# Patient Record
Sex: MALE | Race: WHITE | NOT HISPANIC OR LATINO | Employment: FULL TIME | RURAL
[De-identification: names, ages, dates, MRNs, and addresses within clinical notes are randomized per-mention and may not be internally consistent; named-entity substitution may affect disease eponyms.]

---

## 2020-09-17 ENCOUNTER — HISTORICAL (OUTPATIENT)
Dept: ADMINISTRATIVE | Facility: HOSPITAL | Age: 60
End: 2020-09-17

## 2020-10-08 ENCOUNTER — HISTORICAL (OUTPATIENT)
Dept: ADMINISTRATIVE | Facility: HOSPITAL | Age: 60
End: 2020-10-08

## 2020-10-08 LAB — SARS-COV+SARS-COV-2 AG RESP QL IA.RAPID: NEGATIVE

## 2021-01-12 ENCOUNTER — HISTORICAL (OUTPATIENT)
Dept: ADMINISTRATIVE | Facility: HOSPITAL | Age: 61
End: 2021-01-12

## 2021-01-12 LAB — GLUCOSE SERPL-MCNC: 143 MG/DL (ref 70–105)

## 2021-01-13 LAB

## 2021-04-14 DIAGNOSIS — Z98.890 STATUS POST ARTHROSCOPY OF HIP: Primary | ICD-10-CM

## 2021-04-15 ENCOUNTER — OFFICE VISIT (OUTPATIENT)
Dept: ORTHOPEDICS | Facility: CLINIC | Age: 61
End: 2021-04-15
Payer: OTHER GOVERNMENT

## 2021-04-15 ENCOUNTER — HOSPITAL ENCOUNTER (OUTPATIENT)
Dept: RADIOLOGY | Facility: HOSPITAL | Age: 61
Discharge: HOME | End: 2021-04-15
Attending: ORTHOPAEDIC SURGERY
Payer: OTHER GOVERNMENT

## 2021-04-15 DIAGNOSIS — Z98.890 STATUS POST ARTHROSCOPY OF HIP: ICD-10-CM

## 2021-04-15 DIAGNOSIS — Z98.890 S/P HIP ARTHROSCOPY: Primary | ICD-10-CM

## 2021-04-15 PROCEDURE — 72170 X-RAY EXAM OF PELVIS: CPT | Mod: TC,59

## 2021-04-15 PROCEDURE — 99212 PR OFFICE/OUTPT VISIT, EST, LEVL II, 10-19 MIN: ICD-10-PCS | Mod: ,,, | Performed by: ORTHOPAEDIC SURGERY

## 2021-04-15 PROCEDURE — 72170 X-RAY EXAM OF PELVIS: CPT | Mod: 26,,, | Performed by: ORTHOPAEDIC SURGERY

## 2021-04-15 PROCEDURE — 73502 X-RAY EXAM HIP UNI 2-3 VIEWS: CPT | Mod: TC,LT

## 2021-04-15 PROCEDURE — 72170 XR PELVIS ROUTINE AP: ICD-10-PCS | Mod: 26,,, | Performed by: ORTHOPAEDIC SURGERY

## 2021-04-15 PROCEDURE — 99212 OFFICE O/P EST SF 10 MIN: CPT | Mod: ,,, | Performed by: ORTHOPAEDIC SURGERY

## 2021-06-22 ENCOUNTER — CLINICAL SUPPORT (OUTPATIENT)
Dept: FAMILY MEDICINE | Facility: CLINIC | Age: 61
End: 2021-06-22
Payer: OTHER GOVERNMENT

## 2021-06-22 DIAGNOSIS — Z20.822 SUSPECTED COVID-19 VIRUS INFECTION: Primary | ICD-10-CM

## 2021-06-22 LAB
CTP QC/QA: YES
FLUAV AG NPH QL: NEGATIVE
FLUBV AG NPH QL: NEGATIVE
SARS-COV-2 AG RESP QL IA.RAPID: NEGATIVE

## 2021-06-22 PROCEDURE — 87635 SARS-COV-2 COVID-19 AMP PRB: CPT | Mod: ,,, | Performed by: CLINICAL MEDICAL LABORATORY

## 2021-06-22 PROCEDURE — 87635 SARS-COV-2 (COVID-19) QUALITATIVE PCR: ICD-10-PCS | Mod: ,,, | Performed by: CLINICAL MEDICAL LABORATORY

## 2021-06-22 PROCEDURE — 87428 POCT SARS-COV2 (COVID) WITH FLU ANTIGEN: ICD-10-PCS | Mod: QW,GC,, | Performed by: FAMILY MEDICINE

## 2021-06-22 PROCEDURE — 99202 OFFICE O/P NEW SF 15 MIN: CPT | Mod: GC,,, | Performed by: FAMILY MEDICINE

## 2021-06-22 PROCEDURE — 99202 PR OFFICE/OUTPT VISIT, NEW, LEVL II, 15-29 MIN: ICD-10-PCS | Mod: GC,,, | Performed by: FAMILY MEDICINE

## 2021-06-22 PROCEDURE — 87428 SARSCOV & INF VIR A&B AG IA: CPT | Mod: QW,GC,, | Performed by: FAMILY MEDICINE

## 2021-06-23 LAB — SARS-COV-2 RNA RESP QL NAA+PROBE: NEGATIVE

## 2021-08-02 ENCOUNTER — OFFICE VISIT (OUTPATIENT)
Dept: PAIN MEDICINE | Facility: CLINIC | Age: 61
End: 2021-08-02
Payer: OTHER GOVERNMENT

## 2021-08-02 VITALS
HEIGHT: 71 IN | BODY MASS INDEX: 40.18 KG/M2 | HEART RATE: 89 BPM | SYSTOLIC BLOOD PRESSURE: 159 MMHG | WEIGHT: 287 LBS | RESPIRATION RATE: 20 BRPM | DIASTOLIC BLOOD PRESSURE: 89 MMHG

## 2021-08-02 DIAGNOSIS — M54.17 LUMBOSACRAL RADICULOPATHY: Chronic | ICD-10-CM

## 2021-08-02 DIAGNOSIS — M96.1 POSTLAMINECTOMY SYNDROME, LUMBAR: Primary | Chronic | ICD-10-CM

## 2021-08-02 PROCEDURE — 99215 OFFICE O/P EST HI 40 MIN: CPT | Mod: PBBFAC | Performed by: PAIN MEDICINE

## 2021-08-02 PROCEDURE — 99214 OFFICE O/P EST MOD 30 MIN: CPT | Mod: S$PBB,,, | Performed by: PAIN MEDICINE

## 2021-08-02 PROCEDURE — 99214 PR OFFICE/OUTPT VISIT, EST, LEVL IV, 30-39 MIN: ICD-10-PCS | Mod: S$PBB,,, | Performed by: PAIN MEDICINE

## 2021-08-02 RX ORDER — METFORMIN HYDROCHLORIDE EXTENDED-RELEASE TABLETS 1000 MG/1
1000 TABLET, FILM COATED, EXTENDED RELEASE ORAL 2 TIMES DAILY WITH MEALS
COMMUNITY

## 2021-08-02 RX ORDER — GABAPENTIN 300 MG/1
300 CAPSULE ORAL NIGHTLY
COMMUNITY
End: 2022-10-04

## 2021-08-16 ENCOUNTER — TELEPHONE (OUTPATIENT)
Dept: PAIN MEDICINE | Facility: CLINIC | Age: 61
End: 2021-08-16

## 2021-08-16 DIAGNOSIS — Z11.59 SCREENING FOR VIRAL DISEASE: Primary | ICD-10-CM

## 2021-08-30 ENCOUNTER — TELEPHONE (OUTPATIENT)
Dept: PAIN MEDICINE | Facility: CLINIC | Age: 61
End: 2021-08-30

## 2021-08-30 DIAGNOSIS — Z11.59 SCREENING FOR VIRAL DISEASE: Primary | ICD-10-CM

## 2021-09-16 ENCOUNTER — ANESTHESIA (OUTPATIENT)
Dept: PAIN MEDICINE | Facility: HOSPITAL | Age: 61
End: 2021-09-16
Payer: OTHER GOVERNMENT

## 2021-09-16 ENCOUNTER — HOSPITAL ENCOUNTER (OUTPATIENT)
Facility: HOSPITAL | Age: 61
Discharge: HOME | End: 2021-09-16
Attending: PAIN MEDICINE | Admitting: PAIN MEDICINE
Payer: OTHER GOVERNMENT

## 2021-09-16 ENCOUNTER — ANESTHESIA EVENT (OUTPATIENT)
Dept: PAIN MEDICINE | Facility: HOSPITAL | Age: 61
End: 2021-09-16
Payer: OTHER GOVERNMENT

## 2021-09-16 VITALS
HEIGHT: 71 IN | TEMPERATURE: 98 F | SYSTOLIC BLOOD PRESSURE: 115 MMHG | OXYGEN SATURATION: 96 % | BODY MASS INDEX: 37.66 KG/M2 | DIASTOLIC BLOOD PRESSURE: 65 MMHG | HEART RATE: 71 BPM | RESPIRATION RATE: 11 BRPM | WEIGHT: 269 LBS

## 2021-09-16 DIAGNOSIS — M54.17 RADICULOPATHY OF LUMBOSACRAL REGION: ICD-10-CM

## 2021-09-16 LAB
GLUCOSE SERPL-MCNC: 111 MG/DL (ref 70–105)
GLUCOSE SERPL-MCNC: 111 MG/DL (ref 70–110)

## 2021-09-16 PROCEDURE — 25000003 PHARM REV CODE 250: Performed by: PAIN MEDICINE

## 2021-09-16 PROCEDURE — 25000003 PHARM REV CODE 250: Performed by: NURSE ANESTHETIST, CERTIFIED REGISTERED

## 2021-09-16 PROCEDURE — 64483 NJX AA&/STRD TFRM EPI L/S 1: CPT | Mod: LT,,, | Performed by: PAIN MEDICINE

## 2021-09-16 PROCEDURE — 25500020 PHARM REV CODE 255: Performed by: PAIN MEDICINE

## 2021-09-16 PROCEDURE — 63600175 PHARM REV CODE 636 W HCPCS: Performed by: PAIN MEDICINE

## 2021-09-16 PROCEDURE — D9220A PRA ANESTHESIA: ICD-10-PCS | Mod: ,,, | Performed by: NURSE ANESTHETIST, CERTIFIED REGISTERED

## 2021-09-16 PROCEDURE — D9220A PRA ANESTHESIA: Mod: ,,, | Performed by: NURSE ANESTHETIST, CERTIFIED REGISTERED

## 2021-09-16 PROCEDURE — 37000008 HC ANESTHESIA 1ST 15 MINUTES: Performed by: PAIN MEDICINE

## 2021-09-16 PROCEDURE — 63600175 PHARM REV CODE 636 W HCPCS: Performed by: NURSE ANESTHETIST, CERTIFIED REGISTERED

## 2021-09-16 PROCEDURE — 27201423 OPTIME MED/SURG SUP & DEVICES STERILE SUPPLY: Performed by: PAIN MEDICINE

## 2021-09-16 PROCEDURE — 27000284 HC CANNULA NASAL: Performed by: NURSE ANESTHETIST, CERTIFIED REGISTERED

## 2021-09-16 PROCEDURE — 64483 PR EPIDURAL INJ, ANES/STEROID, TRANSFORAMINAL, LUMB/SACR, SNGL LEVL: ICD-10-PCS | Mod: LT,,, | Performed by: PAIN MEDICINE

## 2021-09-16 PROCEDURE — 64483 NJX AA&/STRD TFRM EPI L/S 1: CPT | Mod: 59 | Performed by: PAIN MEDICINE

## 2021-09-16 PROCEDURE — 82962 GLUCOSE BLOOD TEST: CPT

## 2021-09-16 RX ORDER — SODIUM CHLORIDE 9 MG/ML
INJECTION, SOLUTION INTRAVENOUS CONTINUOUS
Status: DISCONTINUED | OUTPATIENT
Start: 2021-09-16 | End: 2021-09-16 | Stop reason: HOSPADM

## 2021-09-16 RX ORDER — LIDOCAINE HYDROCHLORIDE 20 MG/ML
INJECTION INTRAVENOUS
Status: DISCONTINUED | OUTPATIENT
Start: 2021-09-16 | End: 2021-09-16

## 2021-09-16 RX ORDER — TRIAMCINOLONE ACETONIDE 40 MG/ML
INJECTION, SUSPENSION INTRA-ARTICULAR; INTRAMUSCULAR
Status: DISCONTINUED | OUTPATIENT
Start: 2021-09-16 | End: 2021-09-16 | Stop reason: HOSPADM

## 2021-09-16 RX ORDER — IOPAMIDOL 612 MG/ML
INJECTION, SOLUTION INTRATHECAL
Status: DISCONTINUED | OUTPATIENT
Start: 2021-09-16 | End: 2021-09-16 | Stop reason: HOSPADM

## 2021-09-16 RX ORDER — PROPOFOL 10 MG/ML
VIAL (ML) INTRAVENOUS
Status: DISCONTINUED | OUTPATIENT
Start: 2021-09-16 | End: 2021-09-16

## 2021-09-16 RX ORDER — AMLODIPINE BESYLATE 2.5 MG/1
2.5 TABLET ORAL DAILY
COMMUNITY

## 2021-09-16 RX ADMIN — SODIUM CHLORIDE: 9 INJECTION, SOLUTION INTRAVENOUS at 11:09

## 2021-09-16 RX ADMIN — PROPOFOL 50 MG: 10 INJECTION, EMULSION INTRAVENOUS at 11:09

## 2021-09-16 RX ADMIN — LIDOCAINE HYDROCHLORIDE 100 MG: 20 INJECTION, SOLUTION INTRAVENOUS at 11:09

## 2021-10-04 ENCOUNTER — OFFICE VISIT (OUTPATIENT)
Dept: PAIN MEDICINE | Facility: CLINIC | Age: 61
End: 2021-10-04
Payer: OTHER GOVERNMENT

## 2021-10-04 VITALS
RESPIRATION RATE: 20 BRPM | SYSTOLIC BLOOD PRESSURE: 138 MMHG | DIASTOLIC BLOOD PRESSURE: 93 MMHG | HEIGHT: 71 IN | WEIGHT: 258 LBS | HEART RATE: 76 BPM | BODY MASS INDEX: 36.12 KG/M2

## 2021-10-04 DIAGNOSIS — M54.6 THORACIC SPINE PAIN: Chronic | ICD-10-CM

## 2021-10-04 DIAGNOSIS — M54.17 RADICULOPATHY OF LUMBOSACRAL REGION: Primary | Chronic | ICD-10-CM

## 2021-10-04 PROCEDURE — 99214 OFFICE O/P EST MOD 30 MIN: CPT | Mod: PBBFAC | Performed by: PHYSICIAN ASSISTANT

## 2021-10-04 PROCEDURE — 99213 OFFICE O/P EST LOW 20 MIN: CPT | Mod: S$PBB,,, | Performed by: PHYSICIAN ASSISTANT

## 2021-10-04 PROCEDURE — 99213 PR OFFICE/OUTPT VISIT, EST, LEVL III, 20-29 MIN: ICD-10-PCS | Mod: S$PBB,,, | Performed by: PHYSICIAN ASSISTANT

## 2022-04-19 ENCOUNTER — OFFICE VISIT (OUTPATIENT)
Dept: PAIN MEDICINE | Facility: CLINIC | Age: 62
End: 2022-04-19
Payer: OTHER GOVERNMENT

## 2022-04-19 VITALS
HEART RATE: 70 BPM | WEIGHT: 245 LBS | DIASTOLIC BLOOD PRESSURE: 90 MMHG | RESPIRATION RATE: 17 BRPM | HEIGHT: 71 IN | BODY MASS INDEX: 34.3 KG/M2 | SYSTOLIC BLOOD PRESSURE: 132 MMHG

## 2022-04-19 DIAGNOSIS — M54.17 RADICULOPATHY OF LUMBOSACRAL REGION: Primary | Chronic | ICD-10-CM

## 2022-04-19 DIAGNOSIS — M54.9 DORSALGIA, UNSPECIFIED: ICD-10-CM

## 2022-04-19 DIAGNOSIS — M54.6 THORACIC SPINE PAIN: Chronic | ICD-10-CM

## 2022-04-19 PROCEDURE — 99214 OFFICE O/P EST MOD 30 MIN: CPT | Mod: PBBFAC | Performed by: PHYSICIAN ASSISTANT

## 2022-04-19 PROCEDURE — 99214 OFFICE O/P EST MOD 30 MIN: CPT | Mod: S$PBB,25,, | Performed by: PHYSICIAN ASSISTANT

## 2022-04-19 PROCEDURE — 99214 PR OFFICE/OUTPT VISIT, EST, LEVL IV, 30-39 MIN: ICD-10-PCS | Mod: S$PBB,25,, | Performed by: PHYSICIAN ASSISTANT

## 2022-04-19 PROCEDURE — 96372 THER/PROPH/DIAG INJ SC/IM: CPT | Mod: PBBFAC | Performed by: PHYSICIAN ASSISTANT

## 2022-04-19 RX ORDER — KETOROLAC TROMETHAMINE 30 MG/ML
60 INJECTION, SOLUTION INTRAMUSCULAR; INTRAVENOUS
Status: COMPLETED | OUTPATIENT
Start: 2022-04-19 | End: 2022-04-19

## 2022-04-19 RX ADMIN — KETOROLAC TROMETHAMINE 60 MG: 30 INJECTION, SOLUTION INTRAMUSCULAR at 10:04

## 2022-04-19 NOTE — PROGRESS NOTES
Disclaimer:  This note has been generated using voice recognition software.  There may be type of graft focal areas that have been missed during a proof reading      Subjective:      Patient ID: Ant Webber is a 61 y.o. male.    Chief Complaint: Low-back Pain      Pain  This is a chronic problem. The current episode started more than 1 year ago. The problem occurs daily. The problem has been waxing and waning. Associated symptoms include arthralgias. Pertinent negatives include no change in bowel habit, chest pain, chills, coughing, diaphoresis, rash, sore throat, vertigo or vomiting.     Review of Systems   Constitutional: Negative for activity change, appetite change, chills, diaphoresis and unexpected weight change.   HENT: Negative for drooling, ear discharge, ear pain, facial swelling, mouth dryness, nosebleeds, sore throat, trouble swallowing, voice change and goiter.    Eyes: Negative for photophobia, pain, discharge, redness and visual disturbance.   Respiratory: Negative for apnea, cough, choking, chest tightness, shortness of breath, wheezing and stridor.    Cardiovascular: Negative for chest pain, palpitations and leg swelling.   Gastrointestinal: Negative for abdominal distention, change in bowel habit, diarrhea, rectal pain, vomiting, fecal incontinence and change in bowel habit.   Endocrine: Negative for cold intolerance, heat intolerance, polydipsia, polyphagia and polyuria.   Genitourinary: Negative for bladder incontinence, dysuria, flank pain and frequency.   Musculoskeletal: Positive for arthralgias, back pain and leg pain.   Integumentary:  Negative for color change, pallor and rash.   Neurological: Negative for dizziness, vertigo, seizures, syncope, facial asymmetry, speech difficulty, light-headedness, disturbances in coordination, memory loss and coordination difficulties.   Hematological: Negative for adenopathy. Does not bruise/bleed easily.   Psychiatric/Behavioral: Negative for  "agitation, behavioral problems, confusion, decreased concentration, dysphoric mood, hallucinations, self-injury and suicidal ideas. The patient is not nervous/anxious and is not hyperactive.             Objective:  Vitals:    04/19/22 0909   BP: (!) 132/90   Pulse: 70   Resp: 17   Weight: 111.1 kg (245 lb)   Height: 5' 11" (1.803 m)   PainSc:   4         Physical Exam  Vitals and nursing note reviewed. Exam conducted with a chaperone present.   Constitutional:       General: He is awake. He is not in acute distress.     Appearance: Normal appearance. He is not ill-appearing or toxic-appearing.   HENT:      Head: Normocephalic and atraumatic.      Nose: Nose normal.      Mouth/Throat:      Mouth: Mucous membranes are moist.      Pharynx: Oropharynx is clear.   Eyes:      Conjunctiva/sclera: Conjunctivae normal.      Pupils: Pupils are equal, round, and reactive to light.   Cardiovascular:      Rate and Rhythm: Normal rate.   Pulmonary:      Effort: Pulmonary effort is normal. No respiratory distress.   Abdominal:      Palpations: Abdomen is soft.      Tenderness: There is no guarding.   Musculoskeletal:         General: No signs of injury. Normal range of motion.      Cervical back: Normal range of motion and neck supple. No rigidity.      Thoracic back: Tenderness present.      Lumbar back: Tenderness present.   Skin:     General: Skin is warm and dry.      Coloration: Skin is not jaundiced or pale.   Neurological:      General: No focal deficit present.      Mental Status: He is alert and oriented to person, place, and time. Mental status is at baseline.      Cranial Nerves: Cranial nerves are intact. No cranial nerve deficit (II-XII).   Psychiatric:         Mood and Affect: Mood normal.         Behavior: Behavior normal. Behavior is cooperative.         Thought Content: Thought content normal.           No orders of the defined types were placed in this encounter.       FL Fluoro for Pain Management  See OP Notes " for results.     IMPRESSION: See OP Notes for results.     This procedure was auto-finalized by: Virtual Radiologist       No visits with results within 6 Month(s) from this visit.   Latest known visit with results is:   Admission on 09/16/2021, Discharged on 09/16/2021   Component Date Value Ref Range Status    POC Glucose 09/16/2021 111 (A) 70 - 110 MG/DL Final    POC Glucose 09/16/2021 111 (A) 70 - 105 mg/dL Final           Assessment:      1. Radiculopathy of lumbosacral region    2. Thoracic spine pain              Requested Prescriptions      No prescriptions requested or ordered in this encounter         Plan:    Cannot have MRI, has implantable device for sleep apnea    Not using narcotics from our office    He had lumbar L4 L5/S1 TFESI left side.  September 2021    Complaint of back pain buttock and leg pain pain numbness and tingling bilateral legs worse with standing walking can have some sharp stabbing components bilateral lower extremities worse with increased activity    Denies loss of bowel or bladder function    Requesting further evaluation for his back pain    Had x-rays done emergency room [facility]    X-ray lumbar spine [facility] February 2022 postoperative changes noted scoliosis noted mild, diffuse demineralization multiple level degenerative changes    After discussing options risks benefits she like to proceed with    Lumbar myelogram with post contrast CT    Bone density study diffuse demineralization seen on x-ray lumbar spine    Continue home exercise program as directed    Follow-up 1 month    Dr. Simms, September 2022

## 2022-04-25 ENCOUNTER — HOSPITAL ENCOUNTER (OUTPATIENT)
Dept: RADIOLOGY | Facility: HOSPITAL | Age: 62
Discharge: HOME | End: 2022-04-25
Attending: PHYSICIAN ASSISTANT
Payer: OTHER GOVERNMENT

## 2022-04-25 VITALS
HEART RATE: 82 BPM | RESPIRATION RATE: 18 BRPM | SYSTOLIC BLOOD PRESSURE: 125 MMHG | HEIGHT: 71 IN | OXYGEN SATURATION: 94 % | DIASTOLIC BLOOD PRESSURE: 84 MMHG | BODY MASS INDEX: 35 KG/M2 | TEMPERATURE: 98 F | WEIGHT: 250 LBS

## 2022-04-25 DIAGNOSIS — M54.17 RADICULOPATHY OF LUMBOSACRAL REGION: ICD-10-CM

## 2022-04-25 LAB
APTT PPP: 31.2 SECONDS (ref 25.2–37.3)
BASOPHILS # BLD AUTO: 0.07 K/UL (ref 0–0.2)
BASOPHILS NFR BLD AUTO: 1.2 % (ref 0–1)
DIFFERENTIAL METHOD BLD: ABNORMAL
EOSINOPHIL # BLD AUTO: 0.34 K/UL (ref 0–0.5)
EOSINOPHIL NFR BLD AUTO: 5.7 % (ref 1–4)
EOSINOPHIL NFR BLD MANUAL: 6 % (ref 1–4)
ERYTHROCYTE [DISTWIDTH] IN BLOOD BY AUTOMATED COUNT: 13.2 % (ref 11.5–14.5)
HCT VFR BLD AUTO: 48.5 % (ref 40–54)
HGB BLD-MCNC: 15.5 G/DL (ref 13.5–18)
IMM GRANULOCYTES # BLD AUTO: 0.01 K/UL (ref 0–0.04)
IMM GRANULOCYTES NFR BLD: 0.2 % (ref 0–0.4)
INR BLD: 1.08 (ref 0.9–1.1)
LYMPHOCYTES # BLD AUTO: 0.48 K/UL (ref 1–4.8)
LYMPHOCYTES NFR BLD AUTO: 8.1 % (ref 27–41)
LYMPHOCYTES NFR BLD MANUAL: 5 % (ref 27–41)
MCH RBC QN AUTO: 29.5 PG (ref 27–31)
MCHC RBC AUTO-ENTMCNC: 32 G/DL (ref 32–36)
MCV RBC AUTO: 92.4 FL (ref 80–96)
MONOCYTES # BLD AUTO: 0.54 K/UL (ref 0–0.8)
MONOCYTES NFR BLD AUTO: 9.1 % (ref 2–6)
MONOCYTES NFR BLD MANUAL: 3 % (ref 2–6)
MPC BLD CALC-MCNC: 9.3 FL (ref 9.4–12.4)
NEUTROPHILS # BLD AUTO: 4.49 K/UL (ref 1.8–7.7)
NEUTROPHILS NFR BLD AUTO: 75.7 % (ref 53–65)
NEUTS BAND NFR BLD MANUAL: 2 % (ref 1–5)
NEUTS SEG NFR BLD MANUAL: 84 % (ref 50–62)
NRBC # BLD AUTO: 0 X10E3/UL
NRBC, AUTO (.00): 0 %
PLATELET # BLD AUTO: 292 K/UL (ref 150–400)
PROTHROMBIN TIME: 14 SECONDS (ref 11.7–14.7)
RBC # BLD AUTO: 5.25 M/UL (ref 4.6–6.2)
WBC # BLD AUTO: 5.93 K/UL (ref 4.5–11)

## 2022-04-25 PROCEDURE — 72132 CT LUMBAR SPINE W/DYE: CPT | Mod: 26,,, | Performed by: RADIOLOGY

## 2022-04-25 PROCEDURE — 36415 COLL VENOUS BLD VENIPUNCTURE: CPT | Performed by: PHYSICIAN ASSISTANT

## 2022-04-25 PROCEDURE — 85730 THROMBOPLASTIN TIME PARTIAL: CPT | Performed by: RADIOLOGY

## 2022-04-25 PROCEDURE — 25500020 PHARM REV CODE 255: Performed by: PHYSICIAN ASSISTANT

## 2022-04-25 PROCEDURE — 72132 CT LUMBAR SPINE W/DYE: CPT | Mod: TC

## 2022-04-25 PROCEDURE — 36415 COLL VENOUS BLD VENIPUNCTURE: CPT | Performed by: RADIOLOGY

## 2022-04-25 PROCEDURE — 85025 COMPLETE CBC W/AUTO DIFF WBC: CPT | Performed by: RADIOLOGY

## 2022-04-25 PROCEDURE — 27201268 FL MYELOGRAM LUMBAR WITH CT TO FOLLOW

## 2022-04-25 PROCEDURE — 72132 CT LUMBAR SPINE WITH CONTRAST: ICD-10-PCS | Mod: 26,,, | Performed by: RADIOLOGY

## 2022-04-25 RX ORDER — IOPAMIDOL 408 MG/ML
15 INJECTION, SOLUTION INTRATHECAL
Status: COMPLETED | OUTPATIENT
Start: 2022-04-25 | End: 2022-04-25

## 2022-04-25 RX ORDER — SODIUM CHLORIDE 450 MG/100ML
INJECTION, SOLUTION INTRAVENOUS ONCE
Status: DISCONTINUED | OUTPATIENT
Start: 2022-04-25 | End: 2022-04-26 | Stop reason: HOSPADM

## 2022-04-25 RX ORDER — DIAZEPAM 5 MG/1
5 TABLET ORAL ONCE
Status: DISCONTINUED | OUTPATIENT
Start: 2022-04-25 | End: 2022-04-26 | Stop reason: HOSPADM

## 2022-04-25 RX ADMIN — IOPAMIDOL 15 ML: 408 INJECTION, SOLUTION INTRATHECAL at 01:04

## 2022-04-25 NOTE — PROGRESS NOTES
Lumbar myelogram  Performed by A Fitz PAINTER  H&P has been reviewed.  Labs are within normal limits.  The procedure was explained to the patient including risks and possible complications.  Patient agreed to procedure consent is signed.  A formal timeout was called all staff present agree to patient and procedure.  The lower back was prepped with Betadine and sterile field was established.  1% lidocaine was used as local anesthetic.  Under fluoroscopic guidance a 22 gauge spinal needle was placed at the L3-L4 interspace.  15 cc of Isovue 200 M was injected into the intrathecal sac.  The needle was removed and the puncture site was cleaned and bandaged.  The patient tolerated the procedure well and there were no immediate postprocedure complications.  Total fluoroscopy time was 1 minutes 26 seconds.

## 2022-05-17 NOTE — PROGRESS NOTES
Subjective:      Patient ID: Ant Webber is a 61 y.o. male.    Chief Complaint: Low-back Pain and Leg Pain      Pain  This is a chronic problem. The current episode started more than 1 year ago. The problem occurs daily. The problem has been unchanged. Associated symptoms include arthralgias. Pertinent negatives include no change in bowel habit, chest pain, coughing, diaphoresis, fever, rash, sore throat, vertigo or vomiting.     Review of Systems   Constitutional: Negative for activity change, appetite change, diaphoresis, fever and unexpected weight change.   HENT: Negative for drooling, ear discharge, ear pain, facial swelling, mouth dryness, nosebleeds, sore throat, trouble swallowing, voice change and goiter.    Eyes: Negative for photophobia, pain, discharge, redness and visual disturbance.   Respiratory: Negative for apnea, cough, choking, chest tightness, shortness of breath, wheezing and stridor.    Cardiovascular: Negative for chest pain, palpitations and leg swelling.   Gastrointestinal: Negative for abdominal distention, change in bowel habit, diarrhea, rectal pain, vomiting, fecal incontinence and change in bowel habit.   Endocrine: Negative for cold intolerance, heat intolerance, polydipsia, polyphagia and polyuria.   Genitourinary: Negative for bladder incontinence, dysuria, flank pain and frequency.   Musculoskeletal: Positive for arthralgias, back pain and leg pain.   Integumentary:  Negative for color change, pallor and rash.   Neurological: Negative for dizziness, vertigo, seizures, syncope, facial asymmetry, speech difficulty, light-headedness, disturbances in coordination, memory loss and coordination difficulties.   Hematological: Negative for adenopathy. Does not bruise/bleed easily.   Psychiatric/Behavioral: Negative for agitation, behavioral problems, confusion, decreased concentration, dysphoric mood, hallucinations and suicidal ideas. The patient is not nervous/anxious and is not  "hyperactive.             Objective:  Vitals:    05/18/22 0848   BP: 131/86   Pulse: 82   Resp: 18   Weight: 109.8 kg (242 lb)   Height: 5' 11" (1.803 m)   PainSc:   4         Physical Exam  Vitals and nursing note reviewed. Exam conducted with a chaperone present.   Constitutional:       General: He is awake. He is not in acute distress.     Appearance: Normal appearance. He is not toxic-appearing.   HENT:      Head: Normocephalic and atraumatic.      Nose: Nose normal.      Mouth/Throat:      Mouth: Mucous membranes are moist.      Pharynx: Oropharynx is clear.   Eyes:      Conjunctiva/sclera: Conjunctivae normal.      Pupils: Pupils are equal, round, and reactive to light.   Cardiovascular:      Rate and Rhythm: Normal rate.   Pulmonary:      Effort: Pulmonary effort is normal. No respiratory distress.   Abdominal:      Palpations: Abdomen is soft.      Tenderness: There is no guarding.   Musculoskeletal:         General: Normal range of motion.      Cervical back: Normal range of motion and neck supple. No rigidity.      Thoracic back: Tenderness present.      Lumbar back: Tenderness present.   Skin:     General: Skin is warm and dry.      Coloration: Skin is not jaundiced or pale.   Neurological:      General: No focal deficit present.      Mental Status: He is alert and oriented to person, place, and time. Mental status is at baseline.      Cranial Nerves: Cranial nerves are intact. No cranial nerve deficit (II-XII).   Psychiatric:         Mood and Affect: Mood normal.         Behavior: Behavior normal. Behavior is cooperative.         Thought Content: Thought content normal.           Orders Placed This Encounter   Procedures    DXA Bone Density Spine And Hip     Standing Status:   Future     Standing Expiration Date:   6/18/2022     Order Specific Question:   May the Radiologist modify the order per protocol to meet the clinical needs of the patient?     Answer:   Yes     Order Specific Question:   Release " to patient     Answer:   Immediate    Ambulatory referral/consult to Spine Surgery     Standing Status:   Future     Standing Expiration Date:   6/18/2023     Referral Priority:   Routine     Referral Type:   Consultation     Referral Reason:   Specialty Services Required     Requested Specialty:   Orthopedic Surgery     Number of Visits Requested:   1        CT Lumbar Spine With Contrast  Narrative: EXAMINATION:  CT LUMBAR SPINE WITH CONTRAST    CLINICAL HISTORY:  Low back pain, symptoms persist with > 6wks conservative treatment;  Radiculopathy, lumbosacral region    TECHNIQUE:  Following the injection of intrathecal contrast, a CT of the lumbar spine was performed per myelogram technique.  No intravenous contrast administered.    COMPARISON:  Fluoroscopic imaging earlier today    FINDINGS:  There is no obstruction of the flow of contrast within the thecal sac.    The vertebral body heights and alignment are maintained.  There is partial sacralization of L5 on the left.  Degenerative endplate and facet changes lower lumbar spine.  The conus terminates at T12-L1 level.    T11-12: No spinal canal or foraminal stenosis.    T12-L1: No spinal canal or foraminal stenosis.    L1-L2: No spinal canal or foraminal stenosis.    L2-L3: Disc bulge and facet degeneration.  No spinal canal stenosis.  Mild bilateral foraminal stenosis.    L3-L4: Previous left hemilaminectomy.  Disc bulging.  No spinal canal stenosis.  Facet degeneration.  Moderate left and mild right foraminal stenosis.    L4-5 disc osteophyte complex.  No spinal canal stenosis.  Moderate bilateral foraminal stenosis from facet degeneration and vertebral body osteophytes.    L5-S1: No spinal canal stenosis.  Facet degeneration.  Mild right foraminal stenosis.  Impression: Multilevel degenerative changes of the lumbar spine as detailed.    Electronically signed by: Alan Monet  Date:    04/25/2022  Time:    13:24  FL Myelogram Lumbar with CT to Follow  Narrative:  EXAMINATION:  FL MYELOGRAM LUMBAR WITH CT TO FOLLOW    CLINICAL HISTORY:  Radiculopathy, lumbosacral region    TECHNIQUE:  Performed by MADINA Duran.  Following discussion of the risks, benefits, and alternatives informed consent was obtained.  Patient brought to the fluoroscopy room and placed prone on the table.  Time-out performed.  Fluoroscopy used to  a site.  The overlying skin of the lower back was prepped and draped in routine sterile fashion.  Lidocaine utilized for local anesthetic.  Under fluoroscopic guidance, a 22 gauge spinal needle was advanced to the L3-4 interspace.  Upon removal of the stylet, there was free return of clear CSF.  Next, 15 mL Isovue 200 M was injected into the intrathecal sac under real-time fluoroscopic imaging.  Imaging obtained.  Needle removed.  Sterile dressing applied.  Total fluoroscopy time 1 minutes 28 seconds.  Patient transferred to CT in stable condition.    COMPARISON:  None    FINDINGS:  Fluoroscopic imaging showed the lower lumbar spine.  Contrast flows freely within the intrathecal sac of the lumbar region.  Impression: Successful fluoroscopic guided myelogram for subsequent CT.    Electronically signed by: Alan Monet  Date:    04/25/2022  Time:    13:20       Hospital Outpatient Visit on 04/25/2022   Component Date Value Ref Range Status    PT 04/25/2022 14.0  11.7 - 14.7 seconds Final    INR 04/25/2022 1.08  0.90 - 1.10 Final    PTT 04/25/2022 31.2  25.2 - 37.3 seconds Final    WBC 04/25/2022 5.93  4.50 - 11.00 K/uL Final    RBC 04/25/2022 5.25  4.60 - 6.20 M/uL Final    Hemoglobin 04/25/2022 15.5  13.5 - 18.0 g/dL Final    Hematocrit 04/25/2022 48.5  40.0 - 54.0 % Final    MCV 04/25/2022 92.4  80.0 - 96.0 fL Final    MCH 04/25/2022 29.5  27.0 - 31.0 pg Final    MCHC 04/25/2022 32.0  32.0 - 36.0 g/dL Final    RDW 04/25/2022 13.2  11.5 - 14.5 % Final    Platelet Count 04/25/2022 292  150 - 400 K/uL Final    MPV 04/25/2022 9.3 (A) 9.4 - 12.4 fL  Final    Neutrophils % 04/25/2022 75.7 (A) 53.0 - 65.0 % Final    Lymphocytes % 04/25/2022 8.1 (A) 27.0 - 41.0 % Final    Monocytes % 04/25/2022 9.1 (A) 2.0 - 6.0 % Final    Eosinophils % 04/25/2022 5.7 (A) 1.0 - 4.0 % Final    Basophils % 04/25/2022 1.2 (A) 0.0 - 1.0 % Final    Immature Granulocytes % 04/25/2022 0.2  0.0 - 0.4 % Final    nRBC, Auto 04/25/2022 0.0  <=0.0 % Final    Neutrophils, Abs 04/25/2022 4.49  1.80 - 7.70 K/uL Final    Lymphocytes, Absolute 04/25/2022 0.48 (A) 1.00 - 4.80 K/uL Final    Monocytes, Absolute 04/25/2022 0.54  0.00 - 0.80 K/uL Final    Eosinophils, Absolute 04/25/2022 0.34  0.00 - 0.50 K/uL Final    Basophils, Absolute 04/25/2022 0.07  0.00 - 0.20 K/uL Final    Immature Granulocytes, Absolute 04/25/2022 0.01  0.00 - 0.04 K/uL Final    nRBC, Absolute 04/25/2022 0.00  <=0.00 x10e3/uL Final    Diff Type 04/25/2022 Manual   Final    Segmented Neutrophils, Man % 04/25/2022 84 (A) 50 - 62 % Final    Bands, Man % 04/25/2022 2  1 - 5 % Final    Lymphocytes, Man % 04/25/2022 5 (A) 27 - 41 % Final    Monocytes, Man % 04/25/2022 3  2 - 6 % Final    Eosinophils, Man % 04/25/2022 6 (A) 1 - 4 % Final           Assessment:      1. Radiculopathy of lumbosacral region    2. Thoracic spine pain              Requested Prescriptions      No prescriptions requested or ordered in this encounter         Plan:    Cannot have MRI, has implantable device for sleep apnea    Not using narcotics from our office    Back pain buttock and leg pain pain numbness and tingling bilateral legs worse with standing walking can have some sharp stabbing components bilateral lower extremities worse with increased activity    Requesting Toradol injection for back and joint pain leg pain    Toradol 60 mg IM, tolerated well    History lumbar surgery 2013 [facility]    He had lumbar L4 L5/S1 TFESI left side.  September 2021    Lumbar myelogram [facility] in 19 2022 degenerative changes L3/4  bulging disc no central canal stenosis noted moderate left and mild right foraminal stenosis  L5/S1 mild right foraminal stenosis    X-ray lumbar spine [facility] February 2022 postoperative changes noted scoliosis noted mild, diffuse demineralization multiple level degenerative changes    After discussing options risks benefits she like to proceed with    Referral spine surgery [facility]    Waiting for Bone density study diffuse demineralization seen on x-ray lumbar spine  Will reorder bone density study    Continue home exercise program as directed    Follow-up 3 months    Dr. Simms, September 2022

## 2022-05-18 ENCOUNTER — OFFICE VISIT (OUTPATIENT)
Dept: PAIN MEDICINE | Facility: CLINIC | Age: 62
End: 2022-05-18
Payer: OTHER GOVERNMENT

## 2022-05-18 VITALS
HEART RATE: 82 BPM | BODY MASS INDEX: 33.88 KG/M2 | SYSTOLIC BLOOD PRESSURE: 131 MMHG | DIASTOLIC BLOOD PRESSURE: 86 MMHG | HEIGHT: 71 IN | RESPIRATION RATE: 18 BRPM | WEIGHT: 242 LBS

## 2022-05-18 DIAGNOSIS — M54.17 RADICULOPATHY OF LUMBOSACRAL REGION: Primary | Chronic | ICD-10-CM

## 2022-05-18 DIAGNOSIS — M54.6 THORACIC SPINE PAIN: Chronic | ICD-10-CM

## 2022-05-18 PROCEDURE — 99214 OFFICE O/P EST MOD 30 MIN: CPT | Mod: PBBFAC,25 | Performed by: PHYSICIAN ASSISTANT

## 2022-05-18 PROCEDURE — 96372 THER/PROPH/DIAG INJ SC/IM: CPT | Mod: PBBFAC | Performed by: PHYSICIAN ASSISTANT

## 2022-05-18 PROCEDURE — 99214 PR OFFICE/OUTPT VISIT, EST, LEVL IV, 30-39 MIN: ICD-10-PCS | Mod: S$PBB,25,, | Performed by: PHYSICIAN ASSISTANT

## 2022-05-18 PROCEDURE — 99214 OFFICE O/P EST MOD 30 MIN: CPT | Mod: S$PBB,25,, | Performed by: PHYSICIAN ASSISTANT

## 2022-05-18 RX ORDER — KETOROLAC TROMETHAMINE 30 MG/ML
60 INJECTION, SOLUTION INTRAMUSCULAR; INTRAVENOUS
Status: COMPLETED | OUTPATIENT
Start: 2022-05-18 | End: 2022-05-18

## 2022-05-18 RX ADMIN — KETOROLAC TROMETHAMINE 60 MG: 30 INJECTION, SOLUTION INTRAMUSCULAR at 09:05

## 2022-05-25 ENCOUNTER — HOSPITAL ENCOUNTER (OUTPATIENT)
Dept: RADIOLOGY | Facility: HOSPITAL | Age: 62
Discharge: HOME | End: 2022-05-25
Attending: PHYSICIAN ASSISTANT
Payer: OTHER GOVERNMENT

## 2022-05-25 DIAGNOSIS — M54.6 THORACIC SPINE PAIN: ICD-10-CM

## 2022-05-25 DIAGNOSIS — M54.17 RADICULOPATHY OF LUMBOSACRAL REGION: ICD-10-CM

## 2022-05-25 PROCEDURE — 77080 DXA BONE DENSITY AXIAL: CPT | Mod: 26,,,

## 2022-05-25 PROCEDURE — 77080 DXA BONE DENSITY AXIAL: CPT | Mod: TC

## 2022-05-25 PROCEDURE — 77080 DEXA BONE DENSITY SPINE HIP: ICD-10-PCS | Mod: 26,,,

## 2022-06-06 DIAGNOSIS — M54.16 LUMBAR RADICULOPATHY: Primary | ICD-10-CM

## 2022-06-21 DIAGNOSIS — M54.16 LUMBAR RADICULOPATHY: Primary | ICD-10-CM

## 2022-06-29 ENCOUNTER — HOSPITAL ENCOUNTER (OUTPATIENT)
Dept: RADIOLOGY | Facility: HOSPITAL | Age: 62
Discharge: HOME | End: 2022-06-29
Attending: ORTHOPAEDIC SURGERY
Payer: OTHER GOVERNMENT

## 2022-06-29 ENCOUNTER — OFFICE VISIT (OUTPATIENT)
Dept: SPINE | Facility: CLINIC | Age: 62
End: 2022-06-29
Payer: OTHER GOVERNMENT

## 2022-06-29 DIAGNOSIS — M54.17 RADICULOPATHY OF LUMBOSACRAL REGION: Chronic | ICD-10-CM

## 2022-06-29 DIAGNOSIS — M54.16 LUMBAR RADICULOPATHY: ICD-10-CM

## 2022-06-29 PROCEDURE — 99213 OFFICE O/P EST LOW 20 MIN: CPT | Mod: PBBFAC | Performed by: ORTHOPAEDIC SURGERY

## 2022-06-29 PROCEDURE — 72110 X-RAY EXAM L-2 SPINE 4/>VWS: CPT | Mod: 26,,, | Performed by: ORTHOPAEDIC SURGERY

## 2022-06-29 PROCEDURE — 72110 XR LUMBAR SPINE 4-5 VIEW WITH BENDING VIEWS: ICD-10-PCS | Mod: 26,,, | Performed by: ORTHOPAEDIC SURGERY

## 2022-06-29 PROCEDURE — 99204 PR OFFICE/OUTPT VISIT, NEW, LEVL IV, 45-59 MIN: ICD-10-PCS | Mod: S$PBB,,, | Performed by: ORTHOPAEDIC SURGERY

## 2022-06-29 PROCEDURE — 99204 OFFICE O/P NEW MOD 45 MIN: CPT | Mod: S$PBB,,, | Performed by: ORTHOPAEDIC SURGERY

## 2022-06-29 PROCEDURE — 72110 X-RAY EXAM L-2 SPINE 4/>VWS: CPT | Mod: TC

## 2022-06-29 RX ORDER — CETIRIZINE HYDROCHLORIDE 10 MG/1
10 TABLET ORAL
COMMUNITY
Start: 2022-04-06

## 2022-06-29 RX ORDER — ATORVASTATIN CALCIUM 20 MG/1
TABLET, FILM COATED ORAL
COMMUNITY
Start: 2022-02-05 | End: 2023-06-13

## 2022-06-29 RX ORDER — GABAPENTIN 300 MG/1
2 CAPSULE ORAL NIGHTLY
COMMUNITY
Start: 2022-02-04 | End: 2022-10-04

## 2022-06-29 RX ORDER — PREGABALIN 75 MG/1
75 CAPSULE ORAL 2 TIMES DAILY
Qty: 60 CAPSULE | Refills: 5 | Status: SHIPPED | OUTPATIENT
Start: 2022-06-29 | End: 2023-01-13

## 2022-06-29 RX ORDER — METFORMIN HYDROCHLORIDE 1000 MG/1
TABLET ORAL
COMMUNITY
Start: 2022-02-04 | End: 2023-02-05

## 2022-06-29 RX ORDER — AZITHROMYCIN 250 MG/1
TABLET, FILM COATED ORAL
COMMUNITY
Start: 2022-04-06 | End: 2022-10-04

## 2022-06-29 NOTE — PROGRESS NOTES
MDM/time:  Greater than 45 minutes spent on this encounter including 15 minutes reviewing imaging and notes, 20 minutes with the patient, 10 minutes documentation    ASSESSMENT:  61 y.o. male with lumbar spondylosis with radiculopathy    PLAN:  Lyrica 75 mg 1 tablet twice a day  Referral to Pain Management to discuss possible spinal cord stimulator  Follow-up in 6 months    HPI:  61 y.o. male here for evaluation of low back pain that radiates into the left buttocks down the front of the left leg.  Patient reports a history of back pain for the past 3 years with no known injury.  Has had a recent left hip surgery Dr. Herson Mcintosh.  Unable to have MRI due to an inspire monitor placed for sleep apnea.  Patient denies difficulty with  strength.  Reports issues with balance but no recent falls.  Denies bladder bowel incontinence.  Difficulty walking more than 100 yd due to back pain and left leg pain.  Currently taking gabapentin 300 mg at bedtime and arthritis BC powders.  Currently in physical therapy.  Has been seen by [facility] Dr. Simms and has had multiple injections.  Recent CT myelogram showed L4-5 foraminal stenosis.  Prior L4-5 laminectomy in  at the [facility].  Patient is not a smoker.    IMAGIN2022 lumbar spine x-ray reviewed showed:  On the AP there is lumbar curvature to the left.  There are 5 non-rib-bearing lumbar vertebrae.  On the lateral there is decreased lumbar lordosis.  There is spondylotic disease with decreased disc height and osteophyte formation noted.  No fractures or listhesis noted.  No instability on flexion-extension views.        Past Medical History:   Diagnosis Date    Diabetes     Lumbar post-laminectomy syndrome     Lumbosacral radiculopathy     Sleep apnea      Past Surgical History:   Procedure Laterality Date    BACK SURGERY  2013    Bilateral L4-L5 TFESI Bilateral 10-, 2017, 2017    Dr Simms    Bilateral L5-S1 TFESI Bilateral  05/15/2019    Dr Simms    CHOLECYSTOTOMY      HAND SURGERY      HIP ARTHROSCOPY W/ LABRAL DEBRIDEMENT      L5-S1 SUSAN  2019    Dr Simms    Left L4-L5 TFESI Left 8-, 2016, 2016    Dr Simms    SELECTIVE INJECTION OF ANESTHETIC AGENT AROUND LUMBAR SPINAL NERVE ROOT BY TRANSFORAMINAL APPROACH Left 2021    Procedure: Left L5-S1 TFESI;  Surgeon: Nani Simms MD;  Location: [facility];  Service: Pain Management;  Laterality: Left;  NO VAC   WILL BE TESTED    RECENTLY HAD COVID    TONSILLECTOMY      UMBILICAL HERNIA REPAIR       Social History     Tobacco Use    Smoking status: Former Smoker     Types: Cigarettes     Quit date:      Years since quittin.5    Smokeless tobacco: Former User     Types: Snuff     Quit date:    Substance Use Topics    Alcohol use: Yes     Comment: occasionally    Drug use: Never      Current Outpatient Medications   Medication Instructions    amLODIPine (NORVASC) 2.5 mg, Oral, Daily    atorvastatin (LIPITOR) 20 MG tablet TAKE ONE-HALF TABLET BY MOUTH DAILY FOR CHOLESTEROL. STOP MEDICATION AND CALL PROVIDER FOR ANY UNEXPLAINED MUSCLE ACHES,PAIN OR WEAKNESS    azithromycin (Z-SREE) 250 MG tablet TAKE 2 TABLETS NOW..THEN 1 TABLET DAILY FOR 4 DAYS FOR INFECTION...TAKE WITH OR WITHOUT FOOD    cetirizine (ZYRTEC) 10 mg, Oral    gabapentin (NEURONTIN) 300 MG capsule 2 capsules, Oral, Nightly    gabapentin (NEURONTIN) 300 mg, Oral, Nightly    metFORMIN (FORTAMET) 1,000 mg, Oral, 2 times daily with meals    metFORMIN (GLUCOPHAGE) 1000 MG tablet TAKE ONE TABLET BY MOUTH 2 TIMES A DAY FOR DIABETES . TAKE WITH FOOD .        EXAM:  Constitutional  General Appearance:  There is no height or weight on file to calculate BMI., NAD  Psychiatric   Orientation: Oriented to time, oriented to place, oriented to person  Mood and Affect: Active and alert, normal mood, normal affect  Gait and Station   Appearance:  Antalgic gait to the left,  unable to tandem gait, unable to walk on toes, unable to walk on heels    LUMBAR  Musculoskeletal System   Hips: Normal appearance, no leg length discrepancy, painful decreased motion; left, normal motion; right    Lumbar Spine                   Inspection:  Normal alignment, normal sagittal balance                  Range of motion:  Decreased flexion, extension, lateral bending, rotation. Pain with range of motion                  Bony Palpation of the Lumbar Spine:  No tenderness of the spinous process, no tenderness of the sacrum, no tenderness of the coccyx                  Bony Palpation of the Right Hip:  No tenderness of the iliac crest, no tenderness of the sciatic notch, no tenderness of the SI joint                  Bony Palpation of the Left Hip:  No tenderness of the iliac crest, no tenderness of the sciatic notch, no tenderness of the SI joint                  Soft Tissue Palpation on the Right:  No tenderness of the paraspinal region, no tenderness of the iliolumbar region                  Soft Tissue Palpation on the Left:  No tenderness of the paraspinal region, no tenderness of the iliolumbar region    Motor Strength   L1 Right:  Hip flexion iliopsoas 5/5    L1 Left:  Hip flexion iliopsoas 4/5              L2-L4 Right:  Knee extension quadriceps 5/5, tibialis anterior 5/5              L2-L4 Left:  Knee extension quadriceps 4/5, tibialis anterior 4/5   L5 Right:  Extensor hallucis llongus 5/5,    L5 Left:  Extensor hallucis longus 4/5,    S1 Right:  Plantar flexion gastrocnemius 5/5   S1 Left:  Plantar flexion gastrocnemius 4/5    Neurological System   Ankle Reflex Right:  normal   Ankle Reflex Left: normal   Knee Reflex Right:  normal   Knee Reflex Left:  normal   Sensation on the Right:  L2 normal, L3 normal, L4 normal, L5 normal, S1 normal   Sensation on the Left:  L2 normal, L3 normal, L4 normal, L5 normal, S1 normal              Special Test on the Right:  Seated straight leg raising test  negative, no clonus of the ankle              Special Test on the Left:  Seated straight leg raising test negative, no clonus of the ankle    Skin   Lumbosacral Spine:  Normal skin    Cardiovascular System   Arterial Pulses Right:  Posterior tibialis normal, dorsalis pedis normal   Arterial Pulses Left:  Posterior tibialis normal, dorsalis pedis normal   Edema Right: None   Edema Left:  None

## 2022-10-03 DIAGNOSIS — M25.551 RIGHT HIP PAIN: Primary | ICD-10-CM

## 2022-10-04 ENCOUNTER — HOSPITAL ENCOUNTER (OUTPATIENT)
Dept: RADIOLOGY | Facility: HOSPITAL | Age: 62
Discharge: HOME | End: 2022-10-04
Attending: ORTHOPAEDIC SURGERY
Payer: OTHER GOVERNMENT

## 2022-10-04 ENCOUNTER — OFFICE VISIT (OUTPATIENT)
Dept: ORTHOPEDICS | Facility: CLINIC | Age: 62
End: 2022-10-04
Payer: OTHER GOVERNMENT

## 2022-10-04 DIAGNOSIS — M25.851 FEMOROACETABULAR IMPINGEMENT OF RIGHT HIP: Primary | ICD-10-CM

## 2022-10-04 DIAGNOSIS — M25.551 RIGHT HIP PAIN: ICD-10-CM

## 2022-10-04 PROCEDURE — 99215 PR OFFICE/OUTPT VISIT, EST, LEVL V, 40-54 MIN: ICD-10-PCS | Mod: ,,, | Performed by: ORTHOPAEDIC SURGERY

## 2022-10-04 PROCEDURE — 99215 OFFICE O/P EST HI 40 MIN: CPT | Mod: ,,, | Performed by: ORTHOPAEDIC SURGERY

## 2022-10-04 PROCEDURE — 73502 X-RAY EXAM HIP UNI 2-3 VIEWS: CPT | Mod: 26,RT,, | Performed by: ORTHOPAEDIC SURGERY

## 2022-10-04 PROCEDURE — 73502 X-RAY EXAM HIP UNI 2-3 VIEWS: CPT | Mod: TC,RT

## 2022-10-04 PROCEDURE — 73502 XR HIP WITH PELVIS WHEN PERFORMED, 2 OR 3  VIEWS RIGHT: ICD-10-PCS | Mod: 26,RT,, | Performed by: ORTHOPAEDIC SURGERY

## 2022-10-04 NOTE — PROGRESS NOTES
ASSESSMENT:      ICD-10-CM ICD-9-CM   1. Femoroacetabular impingement of right hip  M25.851 719.95       PLAN:     -Findings and treatment options were discussed with the patient  -All questions answered  Natural history and expected course discussed. Questions answered.  Educational materials distributed.  Home exercises discussed.  He has femoroacetabular impingement of his right hip.  He has had this on his left hip.  His pain is a bit more posterior than is usual.  I would like to treat him with a x-ray guided injection of the right hip today will see if he gets satisfactory relief from this injection  There are no Patient Instructions on file for this visit.    IMAGING:  X-Ray Hip 2 or 3 views Right (with Pelvis when performed)    Result Date: 10/4/2022  See Procedure Notes for results. IMPRESSION: Please see Ortho procedure notes for report.  This procedure was auto-finalized by: Virtual Radiologist   Radiographs of the right hip demonstrate the presence of a large cam deformity with an alpha angle of greater than 60° present in the right hip.  Minimal degenerative changes noted        CC: Hip pain  61 y.o. Male who presents as a new patient to me for evaluation of right hip pain.    Pain has been present for 2-3 months.  Mechanism of injury:   Location of the pain: buttock and lateral  Occupation: retail  Mechanical symptoms, such as catching and popping of the hip are not present.    Symptoms are worsened with activity.  Better with rest. Treatment thus far has included rest, activity modifications, and oral medications.    he has not  had formal physical therapy  he has not had previous advanced imaging such as MRI.   he has not  had previous hip injections.   he has  had previous hip or back surgery.   Here today to discuss diagnosis and treatment options.           REVIEW OF SYSTEMS:   Constitution: Negative. Negative for chills, fever and night sweats.    Hematologic/Lymphatic: Negative for bleeding  problem. Does not bruise/bleed easily.   Skin: Negative for dry skin, itching and rash.   Musculoskeletal: Negative for falls. Positive for knee pain and muscle weakness.     All other review of symptoms were reviewed and found to be noncontributory.     PAST MEDICAL HISTORY:   Past Medical History:   Diagnosis Date    Diabetes     Lumbar post-laminectomy syndrome     Lumbosacral radiculopathy     Sleep apnea        PAST SURGICAL HISTORY:   Past Surgical History:   Procedure Laterality Date    BACK SURGERY  2013    Bilateral L4-L5 TFESI Bilateral 10-, 2017, 2017    Dr Simms    Bilateral L5-S1 TFESI Bilateral 05/15/2019    Dr Simms    CHOLECYSTOTOMY      HAND SURGERY      HIP ARTHROSCOPY W/ LABRAL DEBRIDEMENT      L5-S1 SUSAN  2019    Dr Simms    Left L4-L5 TFESI Left 8-, 2016, 2016    Dr Simms    SELECTIVE INJECTION OF ANESTHETIC AGENT AROUND LUMBAR SPINAL NERVE ROOT BY TRANSFORAMINAL APPROACH Left 2021    Procedure: Left L5-S1 TFESI;  Surgeon: Nani Simms MD;  Location: [facility];  Service: Pain Management;  Laterality: Left;  NO VAC   WILL BE TESTED    RECENTLY HAD COVID    TONSILLECTOMY      UMBILICAL HERNIA REPAIR         FAMILY HISTORY:   Family History   Problem Relation Age of Onset    Parkinsonism Mother     Hypertension Mother     Diabetes Father     Parkinsonism Father        SOCIAL HISTORY:   Social History     Socioeconomic History    Marital status:    Occupational History    Occupation: retail sales   Tobacco Use    Smoking status: Former     Types: Cigarettes     Quit date:      Years since quittin.7    Smokeless tobacco: Former     Types: Snuff     Quit date:    Substance and Sexual Activity    Alcohol use: Yes     Comment: occasionally    Drug use: Never       MEDICATIONS:     Current Outpatient Medications:     amLODIPine (NORVASC) 2.5 MG tablet, Take 2.5 mg by mouth once daily., Disp: , Rfl:     metFORMIN  (FORTAMET) 1,000 mg 24hr tablet, Take 1,000 mg by mouth 2 (two) times daily with meals., Disp: , Rfl:     pregabalin (LYRICA) 75 MG capsule, Take 1 capsule (75 mg total) by mouth 2 (two) times daily., Disp: 60 capsule, Rfl: 5    atorvastatin (LIPITOR) 20 MG tablet, TAKE ONE-HALF TABLET BY MOUTH DAILY FOR CHOLESTEROL. STOP MEDICATION AND CALL PROVIDER FOR ANY UNEXPLAINED MUSCLE ACHES,PAIN OR WEAKNESS, Disp: , Rfl:     cetirizine (ZYRTEC) 10 MG tablet, Take 10 mg by mouth., Disp: , Rfl:     metFORMIN (GLUCOPHAGE) 1000 MG tablet, TAKE ONE TABLET BY MOUTH 2 TIMES A DAY FOR DIABETES . TAKE WITH FOOD ., Disp: , Rfl:     ALLERGIES:   Review of patient's allergies indicates:  No Known Allergies     PHYSICAL EXAMINATION:  There were no vitals taken for this visit.  General    Nursing note and vitals reviewed.  Constitutional: He is oriented to person, place, and time. He appears well-developed and well-nourished.   HENT:   Head: Normocephalic and atraumatic.   Nose: Nose normal.   Eyes: EOM are normal. Pupils are equal, round, and reactive to light.   Neck: Neck supple.   Cardiovascular:  Normal rate and intact distal pulses.            Pulmonary/Chest: Effort normal. No respiratory distress. He exhibits no tenderness.   Abdominal: Soft. He exhibits no distension. There is no abdominal tenderness.   Neurological: He is alert and oriented to person, place, and time. He has normal reflexes.   Psychiatric: He has a normal mood and affect. His behavior is normal. Judgment and thought content normal.             Right Hip Exam     Inspection   Swelling: absent  Bruising: absent  No deformity of hip.  Quadriceps Atrophy:  Negative    Range of Motion   Abduction:  normal   Adduction:  normal   Extension:  normal   Flexion:  normal   External rotation:  normal   Internal rotation:  normal     Tests   Pain w/ forced internal rotation (BETZY): present  Pain w/ forced external rotation (FADIR): present  Trendelenburg Test:  negative  Circumduction test: positive    Other   Sensation: normal  Left Hip Exam     Inspection   Swelling: absent  No deformity of hip.  Quadriceps Atrophy:  negative  Bruising: absent          Muscle Strength   Right Lower Extremity   Hip Abduction: 5/5   Hip Adduction: 5/5   Hip Flexion: 4/5   Ankle Dorsiflexion:  5/5     Reflexes     Right Side   Achilles:  2+  Quadriceps:  2+    Vascular Exam     Right Pulses  Dorsalis Pedis:      2+  Posterior Tibial:      2+          Orders Placed This Encounter   Procedures    FL Aspiration and/or Injection Major Joint with Fluoro, Right (XPD)     Standing Status:   Future     Standing Expiration Date:   10/4/2023     Order Specific Question:   May the Radiologist modify the order per protocol to meet the clinical needs of the patient?     Answer:   Yes     Order Specific Question:   Is the patient allergic to iodine or contrast?     Answer:   No     Order Specific Question:   Release to patient     Answer:   Immediate     Procedures

## 2022-10-11 ENCOUNTER — HOSPITAL ENCOUNTER (OUTPATIENT)
Dept: RADIOLOGY | Facility: HOSPITAL | Age: 62
Discharge: HOME | End: 2022-10-11
Attending: ORTHOPAEDIC SURGERY
Payer: OTHER GOVERNMENT

## 2022-10-11 DIAGNOSIS — M25.851 FEMOROACETABULAR IMPINGEMENT OF RIGHT HIP: ICD-10-CM

## 2022-10-11 PROCEDURE — 25500020 PHARM REV CODE 255: Performed by: RADIOLOGY

## 2022-10-11 PROCEDURE — 27000525 FL ASPIRATION INJECTION MAJOR JOINT RIGHT WITH FLUORO

## 2022-10-11 PROCEDURE — 77002 NEEDLE LOCALIZATION BY XRAY: CPT

## 2022-10-11 RX ORDER — BUPIVACAINE HYDROCHLORIDE AND EPINEPHRINE 5; 5 MG/ML; UG/ML
5 INJECTION, SOLUTION EPIDURAL; INTRACAUDAL; PERINEURAL ONCE
Status: DISCONTINUED | OUTPATIENT
Start: 2022-10-11 | End: 2022-10-12 | Stop reason: HOSPADM

## 2022-10-11 RX ORDER — TRIAMCINOLONE ACETONIDE 40 MG/ML
40 INJECTION, SUSPENSION INTRA-ARTICULAR; INTRAMUSCULAR ONCE
Status: DISCONTINUED | OUTPATIENT
Start: 2022-10-11 | End: 2022-10-12 | Stop reason: HOSPADM

## 2022-10-11 RX ADMIN — IOPAMIDOL 4 ML: 612 INJECTION, SOLUTION INTRAVENOUS at 10:10

## 2022-10-11 NOTE — PROGRESS NOTES
Steroid injection right hip  Performed by A Milling RRA  Procedure was explained to the patient including risks and possible complications.  Patient agreed to procedure consent is signed.  A formal timeout was called all staff present agreed to patient and procedure.    The right hip was prepped with ChloraPrep and sterile field was established.  1% lidocaine was used as local anesthetic.  Under fluoroscopic guidance a 22 gauge needle was placed into the right hip joint from an anterior approach.  4 cc of Isovue-300 was injected to confirm intra-articular placement.  40 mg of Kenalog and 5 cc of bupivacaine was then injected into the right hip joint.  The needle was removed and the puncture site was cleaned bandaged.  The patient tolerated the procedure well and there were no immediate postprocedure complications.  Total fluoroscopy time was 1 minutes 36 seconds.

## 2022-11-09 NOTE — PROGRESS NOTES
Subjective:         Patient ID: Ant Webber is a 62 y.o. male.    Chief Complaint: Low-back Pain and Mid-back Pain      Pain  This is a chronic problem. The current episode started more than 1 year ago. The problem occurs daily. The problem has been waxing and waning. Associated symptoms include anorexia and arthralgias. Pertinent negatives include no change in bowel habit, chest pain, chills, coughing, diaphoresis, fever, rash, sore throat, vertigo or vomiting.   Review of Systems   Constitutional:  Negative for activity change, appetite change, chills, diaphoresis, fever and unexpected weight change.   HENT:  Negative for drooling, ear discharge, ear pain, facial swelling, mouth dryness, nosebleeds, sore throat, trouble swallowing, voice change and goiter.    Eyes:  Negative for photophobia, pain, discharge, redness and visual disturbance.   Respiratory:  Negative for apnea, cough, choking, chest tightness, shortness of breath, wheezing and stridor.    Cardiovascular:  Negative for chest pain, palpitations and leg swelling.   Gastrointestinal:  Positive for anorexia. Negative for abdominal distention, change in bowel habit, diarrhea, rectal pain, vomiting, fecal incontinence and change in bowel habit.   Endocrine: Negative for cold intolerance, heat intolerance, polydipsia, polyphagia and polyuria.   Genitourinary:  Negative for bladder incontinence, dysuria, flank pain and frequency.   Musculoskeletal:  Positive for arthralgias, back pain and leg pain.   Integumentary:  Negative for color change, pallor and rash.   Neurological:  Negative for dizziness, vertigo, seizures, syncope, facial asymmetry, speech difficulty, light-headedness, coordination difficulties, memory loss and coordination difficulties.   Hematological:  Negative for adenopathy. Does not bruise/bleed easily.   Psychiatric/Behavioral:  Negative for agitation, behavioral problems, confusion, decreased concentration, dysphoric mood,  "hallucinations and suicidal ideas. The patient is not nervous/anxious and is not hyperactive.          Past Medical History:   Diagnosis Date    Diabetes     Lumbar post-laminectomy syndrome     Lumbosacral radiculopathy     Sleep apnea      Past Surgical History:   Procedure Laterality Date    BACK SURGERY  2013    Bilateral L4-L5 TFESI Bilateral 10-, 2017, 2017    Dr Simms    Bilateral L5-S1 TFESI Bilateral 05/15/2019    Dr Simms    CHOLECYSTOTOMY      HAND SURGERY      HIP ARTHROSCOPY W/ LABRAL DEBRIDEMENT      L5-S1 SUSAN  2019    Dr Simms    Left L4-L5 TFESI Left 8-, 2016, 2016    Dr Simms    SELECTIVE INJECTION OF ANESTHETIC AGENT AROUND LUMBAR SPINAL NERVE ROOT BY TRANSFORAMINAL APPROACH Left 2021    Procedure: Left L5-S1 TFESI;  Surgeon: Nani Simms MD;  Location: [facility];  Service: Pain Management;  Laterality: Left;  NO VAC   WILL BE TESTED    RECENTLY HAD COVID    TONSILLECTOMY      UMBILICAL HERNIA REPAIR       Social History     Socioeconomic History    Marital status:    Occupational History    Occupation: retail sales   Tobacco Use    Smoking status: Former     Types: Cigarettes     Quit date:      Years since quittin.8    Smokeless tobacco: Former     Types: Snuff     Quit date:    Substance and Sexual Activity    Alcohol use: Yes     Comment: occasionally    Drug use: Never     Family History   Problem Relation Age of Onset    Parkinsonism Mother     Hypertension Mother     Diabetes Father     Parkinsonism Father      Review of patient's allergies indicates:  No Known Allergies     Objective:  Vitals:    22 0922   BP: 133/86   Resp: 16   Weight: 126.6 kg (279 lb)   Height: 5' 11" (1.803 m)   PainSc:   5         Physical Exam  Vitals and nursing note reviewed. Exam conducted with a chaperone present.   Constitutional:       General: He is awake. He is not in acute distress.     Appearance: Normal " appearance. He is not ill-appearing, toxic-appearing or diaphoretic.   HENT:      Head: Normocephalic and atraumatic.      Nose: Nose normal.      Mouth/Throat:      Mouth: Mucous membranes are moist.      Pharynx: Oropharynx is clear.   Eyes:      Conjunctiva/sclera: Conjunctivae normal.      Pupils: Pupils are equal, round, and reactive to light.   Cardiovascular:      Rate and Rhythm: Normal rate.   Pulmonary:      Effort: Pulmonary effort is normal. No respiratory distress.   Abdominal:      Palpations: Abdomen is soft.      Tenderness: There is no guarding.   Musculoskeletal:         General: Normal range of motion.      Cervical back: Normal range of motion and neck supple. No rigidity.      Thoracic back: Tenderness present.      Lumbar back: Tenderness present.   Skin:     General: Skin is warm and dry.      Coloration: Skin is not jaundiced or pale.   Neurological:      General: No focal deficit present.      Mental Status: He is alert and oriented to person, place, and time. Mental status is at baseline.      Cranial Nerves: No cranial nerve deficit (II-XII).   Psychiatric:         Mood and Affect: Mood normal.         Behavior: Behavior normal. Behavior is cooperative.         Thought Content: Thought content normal.         FL Aspiration and/or Injection Major Joint with Fluoro, Right (XPD)  Narrative: EXAMINATION:  FL ASPIRATION INJECTION MAJOR JOINT RIGHT WITH FLUORO    CLINICAL HISTORY:  Other specified joint disorders, right hip    TECHNIQUE:  After discussing the risks, alternatives and benefits of the procedure, written and verbal informed consent was obtained. The risks discussed included allergic reaction, bleeding, infection, and possibility of damage to nearby structures. The patient was positioned supine on the fluoroscopy table. The skin over the hip joint was prepped and draped in the usual sterile fashion. Local anesthesia was achieved using 1% lidocaine  solution. Under fluoroscopic  guidance, a 22-G 3.5  inch needle was inserted into the hip joint space without difficulty. Intra-articular placement of the needle was confirmed using less than 1 mL of water soluble contrast material. This was followed by injection of 40 mg of kenalog solution and 5 mL of bupivacaine solution into the joint. The needle was removed. The patient tolerated procedure well with no immediate postprocedure complications.  Procedure performed by Fitz ZURITA.  Fluoroscopy time 1 minutes 38 seconds.  5 images obtained.    COMPARISON:  None    FINDINGS:  Fluoroscopic images demonstrate degenerative changes in the right hip joint.  Dilution of contrast material was demonstrated after injection of steroid and anesthetic into the joint.  Impression: Successful steroid and bupivacaine injection into the right hip joint.    Electronically signed by: Alan Monet  Date:    10/11/2022  Time:    10:38       No visits with results within 6 Month(s) from this visit.   Latest known visit with results is:   Hospital Outpatient Visit on 04/25/2022   Component Date Value Ref Range Status    PT 04/25/2022 14.0  11.7 - 14.7 seconds Final    INR 04/25/2022 1.08  0.90 - 1.10 Final    PTT 04/25/2022 31.2  25.2 - 37.3 seconds Final    WBC 04/25/2022 5.93  4.50 - 11.00 K/uL Final    RBC 04/25/2022 5.25  4.60 - 6.20 M/uL Final    Hemoglobin 04/25/2022 15.5  13.5 - 18.0 g/dL Final    Hematocrit 04/25/2022 48.5  40.0 - 54.0 % Final    MCV 04/25/2022 92.4  80.0 - 96.0 fL Final    MCH 04/25/2022 29.5  27.0 - 31.0 pg Final    MCHC 04/25/2022 32.0  32.0 - 36.0 g/dL Final    RDW 04/25/2022 13.2  11.5 - 14.5 % Final    Platelet Count 04/25/2022 292  150 - 400 K/uL Final    MPV 04/25/2022 9.3 (L)  9.4 - 12.4 fL Final    Neutrophils % 04/25/2022 75.7 (H)  53.0 - 65.0 % Final    Lymphocytes % 04/25/2022 8.1 (L)  27.0 - 41.0 % Final    Monocytes % 04/25/2022 9.1 (H)  2.0 - 6.0 % Final    Eosinophils % 04/25/2022 5.7 (H)  1.0 - 4.0 % Final    Basophils %  04/25/2022 1.2 (H)  0.0 - 1.0 % Final    Immature Granulocytes % 04/25/2022 0.2  0.0 - 0.4 % Final    nRBC, Auto 04/25/2022 0.0  <=0.0 % Final    Neutrophils, Abs 04/25/2022 4.49  1.80 - 7.70 K/uL Final    Lymphocytes, Absolute 04/25/2022 0.48 (L)  1.00 - 4.80 K/uL Final    Monocytes, Absolute 04/25/2022 0.54  0.00 - 0.80 K/uL Final    Eosinophils, Absolute 04/25/2022 0.34  0.00 - 0.50 K/uL Final    Basophils, Absolute 04/25/2022 0.07  0.00 - 0.20 K/uL Final    Immature Granulocytes, Absolute 04/25/2022 0.01  0.00 - 0.04 K/uL Final    nRBC, Absolute 04/25/2022 0.00  <=0.00 x10e3/uL Final    Diff Type 04/25/2022 Manual   Final    Segmented Neutrophils, Man % 04/25/2022 84 (H)  50 - 62 % Final    Bands, Man % 04/25/2022 2  1 - 5 % Final    Lymphocytes, Man % 04/25/2022 5 (L)  27 - 41 % Final    Monocytes, Man % 04/25/2022 3  2 - 6 % Final    Eosinophils, Man % 04/25/2022 6 (H)  1 - 4 % Final         No orders of the defined types were placed in this encounter.      Requested Prescriptions     Signed Prescriptions Disp Refills    methylPREDNISolone (MEDROL DOSEPACK) 4 mg tablet 21 tablet 0     Sig: use as directed       Assessment:     1. Radiculopathy of lumbosacral region    2. Thoracic spine pain             Plan:    Last seen May 2022    Cannot have MRI, has implantable device for sleep apnea    Not using narcotics from our office    Requesting Toradol injection for back and joint pain leg pain    Toradol 60 mg IM, tolerated well    Medrol Dosepak as directed    History lumbar surgery 2013 [facility]    He had lumbar L4 L5/S1 TFESI left side.  September 2021    Lumbar myelogram [facility] in 19 2022 degenerative changes L3/4 bulging disc no central canal stenosis noted moderate left and mild right foraminal stenosis  L5/S1 mild right foraminal stenosis    X-ray lumbar spine [facility] February 2022 postoperative changes noted scoliosis noted mild, diffuse demineralization multiple level degenerative  changes    Bone density study [facility] May 2022 osteopenia associated with least 2 times increased risk for fracture    He will discuss options with his primary care provider for treatment bone density study results    Continue home exercise program as directed    Follow-up p.r.n. patient request    Dr. Simms, September 2022

## 2022-11-11 ENCOUNTER — OFFICE VISIT (OUTPATIENT)
Dept: PAIN MEDICINE | Facility: CLINIC | Age: 62
End: 2022-11-11
Payer: OTHER GOVERNMENT

## 2022-11-11 VITALS
DIASTOLIC BLOOD PRESSURE: 86 MMHG | BODY MASS INDEX: 39.06 KG/M2 | SYSTOLIC BLOOD PRESSURE: 133 MMHG | WEIGHT: 279 LBS | HEIGHT: 71 IN | RESPIRATION RATE: 16 BRPM

## 2022-11-11 DIAGNOSIS — M54.6 THORACIC SPINE PAIN: Chronic | ICD-10-CM

## 2022-11-11 DIAGNOSIS — M54.17 RADICULOPATHY OF LUMBOSACRAL REGION: Primary | Chronic | ICD-10-CM

## 2022-11-11 PROCEDURE — 96372 THER/PROPH/DIAG INJ SC/IM: CPT | Mod: PBBFAC | Performed by: PHYSICIAN ASSISTANT

## 2022-11-11 PROCEDURE — 99214 OFFICE O/P EST MOD 30 MIN: CPT | Mod: PBBFAC,25 | Performed by: PHYSICIAN ASSISTANT

## 2022-11-11 PROCEDURE — 99214 OFFICE O/P EST MOD 30 MIN: CPT | Mod: S$PBB,25,, | Performed by: PHYSICIAN ASSISTANT

## 2022-11-11 PROCEDURE — 99214 PR OFFICE/OUTPT VISIT, EST, LEVL IV, 30-39 MIN: ICD-10-PCS | Mod: S$PBB,25,, | Performed by: PHYSICIAN ASSISTANT

## 2022-11-11 RX ORDER — METHYLPREDNISOLONE 4 MG/1
TABLET ORAL
Qty: 21 TABLET | Refills: 0 | Status: SHIPPED | OUTPATIENT
Start: 2022-11-11 | End: 2023-06-12

## 2022-11-11 RX ORDER — METHYLPREDNISOLONE 4 MG/1
TABLET ORAL
Qty: 21 TABLET | Refills: 0 | Status: SHIPPED | OUTPATIENT
Start: 2022-11-11 | End: 2022-11-11

## 2022-11-11 RX ORDER — KETOROLAC TROMETHAMINE 30 MG/ML
60 INJECTION, SOLUTION INTRAMUSCULAR; INTRAVENOUS
Status: COMPLETED | OUTPATIENT
Start: 2022-11-11 | End: 2022-11-11

## 2022-11-11 RX ADMIN — KETOROLAC TROMETHAMINE 60 MG: 30 INJECTION, SOLUTION INTRAMUSCULAR at 10:11

## 2022-12-23 ENCOUNTER — HOSPITAL ENCOUNTER (EMERGENCY)
Facility: HOSPITAL | Age: 62
Discharge: HOME | End: 2022-12-23
Payer: OTHER GOVERNMENT

## 2022-12-23 VITALS
SYSTOLIC BLOOD PRESSURE: 110 MMHG | TEMPERATURE: 99 F | RESPIRATION RATE: 20 BRPM | BODY MASS INDEX: 38.5 KG/M2 | HEART RATE: 107 BPM | DIASTOLIC BLOOD PRESSURE: 65 MMHG | OXYGEN SATURATION: 95 % | WEIGHT: 275 LBS | HEIGHT: 71 IN

## 2022-12-23 DIAGNOSIS — J10.1 INFLUENZA A: Primary | ICD-10-CM

## 2022-12-23 LAB
FLUAV AG UPPER RESP QL IA.RAPID: POSITIVE
FLUBV AG UPPER RESP QL IA.RAPID: NEGATIVE
SARS-COV+SARS-COV-2 AG RESP QL IA.RAPID: NEGATIVE

## 2022-12-23 PROCEDURE — 87428 SARSCOV & INF VIR A&B AG IA: CPT | Performed by: NURSE PRACTITIONER

## 2022-12-23 PROCEDURE — 99284 EMERGENCY DEPT VISIT MOD MDM: CPT | Mod: 25

## 2022-12-23 PROCEDURE — 25000003 PHARM REV CODE 250: Performed by: NURSE PRACTITIONER

## 2022-12-23 PROCEDURE — 99284 PR EMERGENCY DEPT VISIT,LEVEL IV: ICD-10-PCS | Mod: ,,, | Performed by: NURSE PRACTITIONER

## 2022-12-23 PROCEDURE — 99284 EMERGENCY DEPT VISIT MOD MDM: CPT | Mod: ,,, | Performed by: NURSE PRACTITIONER

## 2022-12-23 RX ORDER — IBUPROFEN 200 MG
800 TABLET ORAL
Status: COMPLETED | OUTPATIENT
Start: 2022-12-23 | End: 2022-12-23

## 2022-12-23 RX ORDER — OSELTAMIVIR PHOSPHATE 75 MG/1
75 CAPSULE ORAL 2 TIMES DAILY
Qty: 10 CAPSULE | Refills: 0 | Status: SHIPPED | OUTPATIENT
Start: 2022-12-23 | End: 2022-12-28

## 2022-12-23 RX ORDER — AZITHROMYCIN 250 MG/1
250 TABLET, FILM COATED ORAL DAILY
Qty: 6 TABLET | Refills: 0 | Status: SHIPPED | OUTPATIENT
Start: 2022-12-23

## 2022-12-23 RX ADMIN — IBUPROFEN 800 MG: 200 TABLET, FILM COATED ORAL at 12:12

## 2022-12-23 NOTE — DISCHARGE INSTRUCTIONS
Take Tamiflu and azithromycin as prescribed for all 5 days. Use albuterol inhaler 2 puffs every 4 hours for cough or wheezing. Take Tylenol or ibuprofen as needed for fever or pain. Drink plenty of liquids to stay hydrated. Take Vitamin C 1000 mg daily to help boost your immune system. Return to the ED for new or worsening symptoms. Follow-up with your PCP next week.

## 2022-12-23 NOTE — Clinical Note
"Ant Merritt" Akbar was seen and treated in our emergency department on 12/23/2022.  He may return to work on 12/28/2022.  If no fever for 24 hours     If you have any questions or concerns, please don't hesitate to call.      Altagracia Rubio NP"

## 2022-12-23 NOTE — ED PROVIDER NOTES
Encounter Date: 2022       History     Chief Complaint   Patient presents with    Fever    Cough     Flu symptoms started last night     Presented with c/o fever, bodyaches, cough (nonprod), sinus congestion since last pm. Denies chest pain or dyspnea, n/v or abd pain. Has PMH of DM. Glucose at home was 105 this am. Takes Metformin.    Review of patient's allergies indicates:  No Known Allergies  Past Medical History:   Diagnosis Date    Diabetes     Hypertension     Lumbar post-laminectomy syndrome     Lumbosacral radiculopathy     Sleep apnea      Past Surgical History:   Procedure Laterality Date    BACK SURGERY  2013    Bilateral L4-L5 TFESI Bilateral 10-, 2017, 2017    Dr Simms    Bilateral L5-S1 TFESI Bilateral 05/15/2019    Dr Simms    CHOLECYSTOTOMY      HAND SURGERY      HIP ARTHROSCOPY W/ LABRAL DEBRIDEMENT      L5-S1 SUSAN  2019    Dr Simms    Left L4-L5 TFESI Left 8-, 2016, 2016    Dr Simms    SELECTIVE INJECTION OF ANESTHETIC AGENT AROUND LUMBAR SPINAL NERVE ROOT BY TRANSFORAMINAL APPROACH Left 2021    Procedure: Left L5-S1 TFESI;  Surgeon: Nani Simms MD;  Location: [facility];  Service: Pain Management;  Laterality: Left;  NO VAC   WILL BE TESTED    RECENTLY HAD COVID    TONSILLECTOMY      UMBILICAL HERNIA REPAIR       Family History   Problem Relation Age of Onset    Parkinsonism Mother     Hypertension Mother     Diabetes Father     Parkinsonism Father      Social History     Tobacco Use    Smoking status: Former     Types: Cigarettes     Quit date:      Years since quittin.9    Smokeless tobacco: Former     Types: Snuff     Quit date:    Substance Use Topics    Alcohol use: Yes     Comment: occasionally    Drug use: Never     Review of Systems   Constitutional:  Positive for activity change, chills, fatigue and fever.   HENT:  Positive for congestion, sinus pressure, sinus pain and sore throat.    Eyes: Negative.     Respiratory:  Positive for cough. Negative for shortness of breath and wheezing.    Cardiovascular:  Negative for chest pain, palpitations and leg swelling.   Gastrointestinal:  Negative for abdominal pain, diarrhea, nausea and vomiting.   Genitourinary: Negative.    Musculoskeletal:  Positive for myalgias.   Skin: Negative.    Neurological:  Positive for headaches.   Psychiatric/Behavioral: Negative.       Physical Exam     Initial Vitals [12/23/22 1118]   BP Pulse Resp Temp SpO2   (!) 142/79 (!) 120 20 100.2 °F (37.9 °C) 95 %      MAP       --         Physical Exam    Nursing note and vitals reviewed.  Constitutional: He appears well-developed and well-nourished. No distress.   HENT:   Head: Normocephalic.   Right Ear: Tympanic membrane is erythematous.   Left Ear: Tympanic membrane is erythematous.   Nose: Mucosal edema and rhinorrhea present.   Mouth/Throat: Uvula is midline. Posterior oropharyngeal erythema present. No oropharyngeal exudate or posterior oropharyngeal edema.   Eyes: Conjunctivae and EOM are normal. Pupils are equal, round, and reactive to light.   Neck: Neck supple. No JVD present.   Normal range of motion.  Cardiovascular:  Normal rate, regular rhythm, normal heart sounds and intact distal pulses.           No murmur heard.  Pulmonary/Chest: Breath sounds normal. No respiratory distress. He has no wheezes. He has no rhonchi. He has no rales.   Abdominal: Abdomen is soft. Bowel sounds are normal. There is no abdominal tenderness.   Musculoskeletal:         General: No tenderness or edema. Normal range of motion.      Cervical back: Normal range of motion and neck supple.     Lymphadenopathy:     He has no cervical adenopathy.   Neurological: He is alert and oriented to person, place, and time. He has normal strength. GCS score is 15. GCS eye subscore is 4. GCS verbal subscore is 5. GCS motor subscore is 6.   Skin: Skin is warm and dry. Capillary refill takes less than 2 seconds.   Psychiatric:  He has a normal mood and affect. His behavior is normal. Judgment and thought content normal.       Medical Screening Exam   See Full Note    ED Course   Procedures  Labs Reviewed   SARS-COV2 (COVID) W/ FLU ANTIGEN - Abnormal; Notable for the following components:       Result Value    Influenza A Positive (*)     All other components within normal limits    Narrative:     Notification made to HCW ELOVE/Sapna Rojas RN confirmed receipt.  Negative SARS-CoV results should not be used as the sole basis for treatment or patient management decisions; negative results should be considered in the context of a patient's recent exposures, history and the presene of clinical signs and symptoms consistent with COVID-19.  Negative results should be treated as presumptive and confirmed by molecular assay, if necessary for patient management.          Imaging Results              X-Ray Chest 1 View (Final result)  Result time 12/23/22 13:15:14      Final result by Josh Shaikh MD (12/23/22 13:15:14)                   Impression:      No acute cardiopulmonary process      Electronically signed by: Josh Shaikh  Date:    12/23/2022  Time:    13:15               Narrative:    EXAMINATION:  XR CHEST 1 VIEW    CLINICAL HISTORY:  .  Influenza due to other identified influenza virus with other respiratory manifestations    COMPARISON:  No previous chest x-ray available    TECHNIQUE:  AP portable upright chest    FINDINGS:  Neurostimulator generator overlies the right chest with leads extending to the soft tissues at the cervical level to the right of the midline.    Cardiac silhouette is upper normal.  There is no mediastinal mass.  There is no pulmonary vascular engorgement.    Lungs are generally clear for shallow breath.  There is no gross pleural effusion.    There is mild to moderate thoracic spondylosis                                    X-Rays:   Independently Interpreted Readings:   Chest X-Ray: No acute cardiopulmonary  process   Medications   ibuprofen tablet 800 mg (800 mg Oral Given 12/23/22 1204)     Medical Decision Making:   ED Management:  Flu A positive. CXR clear. Rx for Tamiflu and azithromycin.                 Clinical Impression:   Final diagnoses:  [J10.1] Influenza A (Primary)        ED Disposition Condition    Discharge Stable          ED Prescriptions       Medication Sig Dispense Start Date End Date Auth. Provider    azithromycin (Z-SREE) 250 MG tablet Take 1 tablet (250 mg total) by mouth once daily. Take first 2 tablets together, then 1 every day until finished. 6 tablet 12/23/2022 -- Altagracia Rubio NP    oseltamivir (TAMIFLU) 75 MG capsule Take 1 capsule (75 mg total) by mouth 2 (two) times daily. for 5 days 10 capsule 12/23/2022 12/28/2022 Altagracia Rubio NP          Follow-up Information       Follow up With Specialties Details Why Contact Info    [facility] - Emergency Department Emergency Medicine  If symptoms worsen [address] [facility] [id number] [phone]             Altagracia Rubio NP  12/23/22 1647

## 2023-01-13 ENCOUNTER — OFFICE VISIT (OUTPATIENT)
Dept: SPINE | Facility: CLINIC | Age: 63
End: 2023-01-13
Payer: OTHER GOVERNMENT

## 2023-01-13 DIAGNOSIS — M54.17 RADICULOPATHY OF LUMBOSACRAL REGION: Primary | ICD-10-CM

## 2023-01-13 DIAGNOSIS — M51.36 DDD (DEGENERATIVE DISC DISEASE), LUMBAR: ICD-10-CM

## 2023-01-13 PROCEDURE — 99213 OFFICE O/P EST LOW 20 MIN: CPT | Mod: PBBFAC | Performed by: ORTHOPAEDIC SURGERY

## 2023-01-13 PROCEDURE — 99214 PR OFFICE/OUTPT VISIT, EST, LEVL IV, 30-39 MIN: ICD-10-PCS | Mod: S$PBB,,, | Performed by: ORTHOPAEDIC SURGERY

## 2023-01-13 PROCEDURE — 99214 OFFICE O/P EST MOD 30 MIN: CPT | Mod: S$PBB,,, | Performed by: ORTHOPAEDIC SURGERY

## 2023-01-13 RX ORDER — PREGABALIN 100 MG/1
100 CAPSULE ORAL 2 TIMES DAILY
Qty: 60 CAPSULE | Refills: 5 | Status: SHIPPED | OUTPATIENT
Start: 2023-01-13 | End: 2023-06-13 | Stop reason: SDUPTHER

## 2023-01-15 NOTE — PROGRESS NOTES
MDM/time:  Greater than 30 minutes spent on this encounter including 10 minutes reviewing imaging and notes, 15 minutes with the patient, 5 minutes documentation    ASSESSMENT:  62 y.o. male with lumbar spondylosis with radiculopathy    PLAN:  Increase Lyrica to 100 mg.  Follow-up as needed.  Follow-up with pain management and discuss spinal cord stimulator    HPI:  62 y.o. male here for repeat evaluation of low back pain that radiates into the left buttocks down the front of the left leg.  Patient reports a history of back pain for the past 3 years with no known injury.  Unable to have MRI due to an inspire stimulator placed for sleep apnea.  Patient denies difficulty with  strength.  Reports issues with balance but no recent falls.  Denies bladder bowel incontinence.  Difficulty walking more than 100 yd due to back pain and left leg pain.  Currently taking gabapentin 300 mg at bedtime and arthritis BC powders.  Currently in physical therapy.  Has been seen by [facility] Dr. Simms and has had multiple injections.  CT myelogram showed L4-5 foraminal stenosis.  Prior L4-5 laminectomy in  at the [facility].  Patient is not a smoker.    IMAGIN2022 lumbar spine x-ray reviewed showed:  On the AP there is lumbar curvature to the left.  There are 5 non-rib-bearing lumbar vertebrae.  On the lateral there is decreased lumbar lordosis.  There is spondylotic disease with decreased disc height and osteophyte formation noted.  No fractures or listhesis noted.  No instability on flexion-extension views.    Past Medical History:   Diagnosis Date    Diabetes     Hypertension     Lumbar post-laminectomy syndrome     Lumbosacral radiculopathy     Sleep apnea      Past Surgical History:   Procedure Laterality Date    BACK SURGERY  2013    Bilateral L4-L5 TFESI Bilateral 10-, 2017, 2017    Dr Simms    Bilateral L5-S1 TFESI Bilateral 05/15/2019    Dr Simms    CHOLECYSTOTOMY      HAND  SURGERY      HIP ARTHROSCOPY W/ LABRAL DEBRIDEMENT      L5-S1 SUSAN  2019    Dr Simms    Left L4-L5 TFESI Left 8-, 2016, 2016    Dr Simms    SELECTIVE INJECTION OF ANESTHETIC AGENT AROUND LUMBAR SPINAL NERVE ROOT BY TRANSFORAMINAL APPROACH Left 2021    Procedure: Left L5-S1 TFESI;  Surgeon: Nani Simms MD;  Location: [facility];  Service: Pain Management;  Laterality: Left;  NO VAC   WILL BE TESTED    RECENTLY HAD COVID    TONSILLECTOMY      UMBILICAL HERNIA REPAIR       Social History     Tobacco Use    Smoking status: Former     Types: Cigarettes     Quit date:      Years since quittin.0    Smokeless tobacco: Former     Types: Snuff     Quit date:    Substance Use Topics    Alcohol use: Yes     Comment: occasionally    Drug use: Never      Current Outpatient Medications   Medication Instructions    amLODIPine (NORVASC) 2.5 mg, Oral, Daily    atorvastatin (LIPITOR) 20 MG tablet TAKE ONE-HALF TABLET BY MOUTH DAILY FOR CHOLESTEROL. STOP MEDICATION AND CALL PROVIDER FOR ANY UNEXPLAINED MUSCLE ACHES,PAIN OR WEAKNESS    azithromycin (Z-SREE) 250 mg, Oral, Daily, Take first 2 tablets together, then 1 every day until finished.    cetirizine (ZYRTEC) 10 mg, Oral    metFORMIN (FORTAMET) 1,000 mg, Oral, 2 times daily with meals    metFORMIN (GLUCOPHAGE) 1000 MG tablet TAKE ONE TABLET BY MOUTH 2 TIMES A DAY FOR DIABETES . TAKE WITH FOOD .    methylPREDNISolone (MEDROL DOSEPACK) 4 mg tablet use as directed    pregabalin (LYRICA) 100 mg, Oral, 2 times daily        EXAM:  Constitutional  General Appearance:  There is no height or weight on file to calculate BMI., NAD  Psychiatric   Orientation: Oriented to time, oriented to place, oriented to person  Mood and Affect: Active and alert, normal mood, normal affect  Gait and Station   Appearance:  Antalgic gait to the left, unable to tandem gait, unable to walk on toes, unable to walk on heels    LUMBAR  Musculoskeletal  System   Hips: Normal appearance, no leg length discrepancy, painful decreased motion; left, normal motion; right    Lumbar Spine                   Inspection:  Normal alignment, normal sagittal balance                  Range of motion:  Decreased flexion, extension, lateral bending, rotation. Pain with range of motion                  Bony Palpation of the Lumbar Spine:  No tenderness of the spinous process, no tenderness of the sacrum, no tenderness of the coccyx                  Bony Palpation of the Right Hip:  No tenderness of the iliac crest, no tenderness of the sciatic notch, no tenderness of the SI joint                  Bony Palpation of the Left Hip:  No tenderness of the iliac crest, no tenderness of the sciatic notch, no tenderness of the SI joint                  Soft Tissue Palpation on the Right:  No tenderness of the paraspinal region, no tenderness of the iliolumbar region                  Soft Tissue Palpation on the Left:  No tenderness of the paraspinal region, no tenderness of the iliolumbar region    Motor Strength   L1 Right:  Hip flexion iliopsoas 5/5    L1 Left:  Hip flexion iliopsoas 4/5              L2-L4 Right:  Knee extension quadriceps 5/5, tibialis anterior 5/5              L2-L4 Left:  Knee extension quadriceps 4/5, tibialis anterior 4/5   L5 Right:  Extensor hallucis llongus 5/5,    L5 Left:  Extensor hallucis longus 4/5,    S1 Right:  Plantar flexion gastrocnemius 5/5   S1 Left:  Plantar flexion gastrocnemius 4/5    Neurological System   Ankle Reflex Right:  normal   Ankle Reflex Left: normal   Knee Reflex Right:  normal   Knee Reflex Left:  normal   Sensation on the Right:  L2 normal, L3 normal, L4 normal, L5 normal, S1 normal   Sensation on the Left:  L2 normal, L3 normal, L4 normal, L5 normal, S1 normal              Special Test on the Right:  Seated straight leg raising test negative, no clonus of the ankle              Special Test on the Left:  Seated straight leg raising  test negative, no clonus of the ankle    Skin   Lumbosacral Spine:  Normal skin    Cardiovascular System   Arterial Pulses Right:  Posterior tibialis normal, dorsalis pedis normal   Arterial Pulses Left:  Posterior tibialis normal, dorsalis pedis normal   Edema Right: None   Edema Left:  None

## 2023-02-24 ENCOUNTER — OFFICE VISIT (OUTPATIENT)
Dept: PAIN MEDICINE | Facility: CLINIC | Age: 63
End: 2023-02-24
Payer: OTHER GOVERNMENT

## 2023-02-24 VITALS
BODY MASS INDEX: 40.04 KG/M2 | WEIGHT: 286 LBS | DIASTOLIC BLOOD PRESSURE: 73 MMHG | HEIGHT: 71 IN | HEART RATE: 76 BPM | SYSTOLIC BLOOD PRESSURE: 157 MMHG | RESPIRATION RATE: 18 BRPM

## 2023-02-24 DIAGNOSIS — M54.6 THORACIC SPINE PAIN: Chronic | ICD-10-CM

## 2023-02-24 DIAGNOSIS — M54.17 LUMBOSACRAL RADICULOPATHY: Primary | Chronic | ICD-10-CM

## 2023-02-24 PROCEDURE — 99214 PR OFFICE/OUTPT VISIT, EST, LEVL IV, 30-39 MIN: ICD-10-PCS | Mod: S$PBB,25,, | Performed by: PHYSICIAN ASSISTANT

## 2023-02-24 PROCEDURE — 96372 THER/PROPH/DIAG INJ SC/IM: CPT | Mod: PBBFAC | Performed by: PHYSICIAN ASSISTANT

## 2023-02-24 PROCEDURE — 99214 OFFICE O/P EST MOD 30 MIN: CPT | Mod: PBBFAC,25 | Performed by: PHYSICIAN ASSISTANT

## 2023-02-24 PROCEDURE — 99214 OFFICE O/P EST MOD 30 MIN: CPT | Mod: S$PBB,25,, | Performed by: PHYSICIAN ASSISTANT

## 2023-02-24 RX ORDER — KETOROLAC TROMETHAMINE 30 MG/ML
60 INJECTION, SOLUTION INTRAMUSCULAR; INTRAVENOUS
Status: COMPLETED | OUTPATIENT
Start: 2023-02-24 | End: 2023-02-24

## 2023-02-24 RX ADMIN — KETOROLAC TROMETHAMINE 60 MG: 30 INJECTION, SOLUTION INTRAMUSCULAR at 09:02

## 2023-02-24 NOTE — PROGRESS NOTES
Subjective:         Patient ID: Ant Webber is a 62 y.o. male.    Chief Complaint: Low-back Pain and Leg Pain        Pain  This is a chronic problem. The current episode started more than 1 year ago. The problem occurs daily. The problem has been unchanged. Associated symptoms include arthralgias. Pertinent negatives include no anorexia, chest pain, chills, coughing, diaphoresis, fatigue, fever, sore throat, vertigo or vomiting.   Review of Systems   Constitutional:  Negative for activity change, appetite change, chills, diaphoresis, fatigue, fever and unexpected weight change.   HENT:  Negative for drooling, ear discharge, ear pain, facial swelling, mouth dryness, nosebleeds, sore throat, trouble swallowing, voice change and goiter.    Eyes:  Negative for photophobia, pain, discharge, redness and visual disturbance.   Respiratory:  Negative for apnea, cough, choking, chest tightness, shortness of breath, wheezing and stridor.    Cardiovascular:  Negative for chest pain, palpitations and leg swelling.   Gastrointestinal:  Negative for abdominal distention, anorexia, diarrhea, rectal pain, vomiting and fecal incontinence.   Endocrine: Negative for cold intolerance, heat intolerance, polydipsia, polyphagia and polyuria.   Genitourinary:  Negative for bladder incontinence, dysuria, flank pain and frequency.   Musculoskeletal:  Positive for arthralgias, back pain and leg pain.   Integumentary:  Negative for color change and pallor.   Neurological:  Negative for dizziness, vertigo, seizures, syncope, facial asymmetry, speech difficulty, light-headedness, coordination difficulties, memory loss and coordination difficulties.   Hematological:  Negative for adenopathy. Does not bruise/bleed easily.   Psychiatric/Behavioral:  Negative for agitation, behavioral problems, confusion, decreased concentration, dysphoric mood, hallucinations and suicidal ideas. The patient is not nervous/anxious and is not hyperactive.       "    Past Medical History:   Diagnosis Date    Diabetes     Hypertension     Lumbar post-laminectomy syndrome     Lumbosacral radiculopathy     Sleep apnea      Past Surgical History:   Procedure Laterality Date    BACK SURGERY  2013    Bilateral L4-L5 TFESI Bilateral 10-, 2017, 2017    Dr Simms    Bilateral L5-S1 TFESI Bilateral 05/15/2019    Dr Simms    CHOLECYSTOTOMY      HAND SURGERY      HIP ARTHROSCOPY W/ LABRAL DEBRIDEMENT      L5-S1 SUSAN  2019    Dr Simms    Left L4-L5 TFESI Left 8-, 2016, 2016    Dr Simms    SELECTIVE INJECTION OF ANESTHETIC AGENT AROUND LUMBAR SPINAL NERVE ROOT BY TRANSFORAMINAL APPROACH Left 2021    Procedure: Left L5-S1 TFESI;  Surgeon: Nani Simms MD;  Location: [facility];  Service: Pain Management;  Laterality: Left;  NO VAC   WILL BE TESTED    RECENTLY HAD COVID    TONSILLECTOMY      UMBILICAL HERNIA REPAIR       Social History     Socioeconomic History    Marital status:    Occupational History    Occupation: retail sales   Tobacco Use    Smoking status: Former     Types: Cigarettes     Quit date:      Years since quittin.1    Smokeless tobacco: Former     Types: Snuff     Quit date:    Substance and Sexual Activity    Alcohol use: Yes     Comment: occasionally    Drug use: Never     Family History   Problem Relation Age of Onset    Parkinsonism Mother     Hypertension Mother     Diabetes Father     Parkinsonism Father      Review of patient's allergies indicates:  No Known Allergies     Objective:  Vitals:    23 0858   BP: (!) 157/73   Pulse: 76   Resp: 18   Weight: 129.7 kg (286 lb)   Height: 5' 11" (1.803 m)   PainSc:   4         Physical Exam  Vitals and nursing note reviewed. Exam conducted with a chaperone present.   Constitutional:       General: He is awake. He is not in acute distress.     Appearance: Normal appearance. He is not ill-appearing or diaphoretic.   HENT:      Head: " Normocephalic and atraumatic.      Nose: Nose normal.      Mouth/Throat:      Mouth: Mucous membranes are moist.      Pharynx: Oropharynx is clear.   Eyes:      Conjunctiva/sclera: Conjunctivae normal.      Pupils: Pupils are equal, round, and reactive to light.   Cardiovascular:      Rate and Rhythm: Normal rate.   Pulmonary:      Effort: Pulmonary effort is normal. No respiratory distress.   Abdominal:      Palpations: Abdomen is soft.      Tenderness: There is no guarding.   Musculoskeletal:         General: Normal range of motion.      Cervical back: Normal range of motion and neck supple. No rigidity.      Thoracic back: Tenderness present.      Lumbar back: Tenderness present.   Skin:     General: Skin is warm and dry.      Coloration: Skin is not jaundiced or pale.   Neurological:      General: No focal deficit present.      Mental Status: He is alert and oriented to person, place, and time. Mental status is at baseline.      Cranial Nerves: No cranial nerve deficit (II-XII).   Psychiatric:         Mood and Affect: Mood normal.         Behavior: Behavior normal. Behavior is cooperative.         Thought Content: Thought content normal.         X-Ray Chest 1 View  Narrative: EXAMINATION:  XR CHEST 1 VIEW    CLINICAL HISTORY:  .  Influenza due to other identified influenza virus with other respiratory manifestations    COMPARISON:  No previous chest x-ray available    TECHNIQUE:  AP portable upright chest    FINDINGS:  Neurostimulator generator overlies the right chest with leads extending to the soft tissues at the cervical level to the right of the midline.    Cardiac silhouette is upper normal.  There is no mediastinal mass.  There is no pulmonary vascular engorgement.    Lungs are generally clear for shallow breath.  There is no gross pleural effusion.    There is mild to moderate thoracic spondylosis  Impression: No acute cardiopulmonary process    Electronically signed by: Josh  Neel  Date:    12/23/2022  Time:    13:15         Admission on 12/23/2022, Discharged on 12/23/2022   Component Date Value Ref Range Status    Influenza A 12/23/2022 Positive (A)  Negative, Invalid Final    Influenza B 12/23/2022 Negative  Negative, Invalid Final    COVID-19 Ag 12/23/2022 Negative  Negative, Invalid Final         No orders of the defined types were placed in this encounter.        Requested Prescriptions      No prescriptions requested or ordered in this encounter       Assessment:     1. Lumbosacral radiculopathy    2. Thoracic spine pain             Plan:    Last seen November 2022    History lumbar surgery 2013 [facility]    Cannot have MRI, has implantable device for sleep apnea    Not using narcotics from our office    Spine surgeries recommended spinal cord stimulator    Spinal cord stimulator discuss with patient stimulator information given to the patient     He states at present he would like to continue with conservative management    Requesting Toradol injection for back and joint pain leg pain    Toradol 60 mg IM, tolerated well    Medrol Dosepak as directed    He had lumbar L4 L5/S1 TFESI left side.  September 2021    Lumbar myelogram [facility] in 19 2022 degenerative changes L3/4 bulging disc no central canal stenosis noted moderate left and mild right foraminal stenosis  L5/S1 mild right foraminal stenosis    X-ray lumbar spine [facility] February 2022 postoperative changes noted scoliosis noted mild, diffuse demineralization multiple level degenerative changes    Bone density study [facility] May 2022 osteopenia associated with least 2 times increased risk for fracture    He will discuss options with his primary care provider for treatment bone density study results    Continue home exercise program as directed    Follow-up p.r.n. patient request    Dr. Simms, September 2022

## 2023-06-02 DIAGNOSIS — M54.16 LUMBAR RADICULOPATHY: Primary | ICD-10-CM

## 2023-06-12 NOTE — PROGRESS NOTES
Subjective:         Patient ID: Ant Webber is a 62 y.o. male.    Chief Complaint: Mid-back Pain, Low-back Pain, and Leg Pain (Bilateral leg pain)        Pain  This is a chronic problem. The current episode started more than 1 year ago. The problem occurs daily. The problem has been waxing and waning. Associated symptoms include arthralgias. Pertinent negatives include no anorexia, chest pain, chills, coughing, diaphoresis, fever, sore throat, vertigo or vomiting.   Review of Systems   Constitutional:  Negative for activity change, appetite change, chills, diaphoresis, fever and unexpected weight change.   HENT:  Negative for drooling, ear discharge, ear pain, facial swelling, mouth dryness, nosebleeds, sore throat, trouble swallowing, voice change and goiter.    Eyes:  Negative for photophobia, pain, discharge, redness and visual disturbance.   Respiratory:  Negative for apnea, cough, choking, chest tightness, shortness of breath, wheezing and stridor.    Cardiovascular:  Negative for chest pain, palpitations and leg swelling.   Gastrointestinal:  Negative for abdominal distention, anorexia, diarrhea, rectal pain, vomiting and fecal incontinence.   Endocrine: Negative for cold intolerance, heat intolerance, polydipsia, polyphagia and polyuria.   Genitourinary:  Negative for bladder incontinence, dysuria, flank pain and frequency.   Musculoskeletal:  Positive for arthralgias, back pain and leg pain.   Integumentary:  Negative for color change and pallor.   Neurological:  Negative for dizziness, vertigo, seizures, syncope, facial asymmetry, speech difficulty, light-headedness, coordination difficulties, memory loss and coordination difficulties.   Hematological:  Negative for adenopathy. Does not bruise/bleed easily.   Psychiatric/Behavioral:  Negative for agitation, behavioral problems, confusion, decreased concentration, dysphoric mood, hallucinations and suicidal ideas. The patient is not nervous/anxious and  "is not hyperactive.          Past Medical History:   Diagnosis Date    Diabetes     Hypertension     Lumbar post-laminectomy syndrome     Lumbosacral radiculopathy     Sleep apnea      Past Surgical History:   Procedure Laterality Date    BACK SURGERY  2013    Bilateral L4-L5 TFESI Bilateral 10-, 2017, 2017    Dr Simms    Bilateral L5-S1 TFESI Bilateral 05/15/2019    Dr Simms    CHOLECYSTOTOMY      HAND SURGERY      HIP ARTHROSCOPY W/ LABRAL DEBRIDEMENT      L5-S1 SUSAN  2019    Dr Simms    Left L4-L5 TFESI Left 8-, 2016, 2016    Dr iSmms    SELECTIVE INJECTION OF ANESTHETIC AGENT AROUND LUMBAR SPINAL NERVE ROOT BY TRANSFORAMINAL APPROACH Left 2021    Procedure: Left L5-S1 TFESI;  Surgeon: Nani Simms MD;  Location: [facility];  Service: Pain Management;  Laterality: Left;  NO VAC   WILL BE TESTED    RECENTLY HAD COVID    TONSILLECTOMY      UMBILICAL HERNIA REPAIR       Social History     Socioeconomic History    Marital status:    Occupational History    Occupation: retail sales   Tobacco Use    Smoking status: Former     Types: Cigarettes     Quit date:      Years since quittin.4    Smokeless tobacco: Former     Types: Snuff     Quit date:    Substance and Sexual Activity    Alcohol use: Yes     Comment: occasionally    Drug use: Never     Family History   Problem Relation Age of Onset    Parkinsonism Mother     Hypertension Mother     Diabetes Father     Parkinsonism Father      Review of patient's allergies indicates:  No Known Allergies     Objective:  Vitals:    23 0904   BP: (!) 137/93   Pulse: 83   Resp: 18   Weight: 131.5 kg (290 lb)   Height: 5' 11" (1.803 m)   PainSc:   6         Physical Exam  Vitals and nursing note reviewed. Exam conducted with a chaperone present.   Constitutional:       General: He is awake. He is not in acute distress.     Appearance: Normal appearance. He is not ill-appearing or " diaphoretic.   HENT:      Head: Normocephalic and atraumatic.      Nose: Nose normal.      Mouth/Throat:      Mouth: Mucous membranes are moist.      Pharynx: Oropharynx is clear.   Eyes:      Conjunctiva/sclera: Conjunctivae normal.      Pupils: Pupils are equal, round, and reactive to light.   Cardiovascular:      Rate and Rhythm: Normal rate.   Pulmonary:      Effort: Pulmonary effort is normal. No respiratory distress.   Abdominal:      Palpations: Abdomen is soft.      Tenderness: There is no guarding.   Musculoskeletal:         General: Normal range of motion.      Cervical back: Normal range of motion and neck supple. No rigidity.      Thoracic back: Tenderness present.      Lumbar back: Tenderness present.   Skin:     General: Skin is warm and dry.      Coloration: Skin is not jaundiced or pale.   Neurological:      General: No focal deficit present.      Mental Status: He is alert and oriented to person, place, and time. Mental status is at baseline.      Cranial Nerves: No cranial nerve deficit (II-XII).   Psychiatric:         Mood and Affect: Mood normal.         Behavior: Behavior normal. Behavior is cooperative.         Thought Content: Thought content normal.         X-Ray Chest 1 View  Narrative: EXAMINATION:  XR CHEST 1 VIEW    CLINICAL HISTORY:  .  Influenza due to other identified influenza virus with other respiratory manifestations    COMPARISON:  No previous chest x-ray available    TECHNIQUE:  AP portable upright chest    FINDINGS:  Neurostimulator generator overlies the right chest with leads extending to the soft tissues at the cervical level to the right of the midline.    Cardiac silhouette is upper normal.  There is no mediastinal mass.  There is no pulmonary vascular engorgement.    Lungs are generally clear for shallow breath.  There is no gross pleural effusion.    There is mild to moderate thoracic spondylosis  Impression: No acute cardiopulmonary process    Electronically signed  by: Josh Shaikh  Date:    12/23/2022  Time:    13:15         Admission on 12/23/2022, Discharged on 12/23/2022   Component Date Value Ref Range Status    Influenza A 12/23/2022 Positive (A)  Negative, Invalid Final    Influenza B 12/23/2022 Negative  Negative, Invalid Final    COVID-19 Ag 12/23/2022 Negative  Negative, Invalid Final         No orders of the defined types were placed in this encounter.        Requested Prescriptions     Signed Prescriptions Disp Refills    pregabalin (LYRICA) 100 MG capsule 90 capsule 0     Sig: Take 1 capsule (100 mg total) by mouth 3 (three) times daily.    tiZANidine 4 mg Cap 90 capsule 0     Sig: Take 4 mg by mouth every 8 (eight) hours as needed (Spasm).       Assessment:     1. Lumbosacral radiculopathy    2. Thoracic spine pain             Plan:    Last seen November 2022    History lumbar surgery 2013 [facility]    Cannot have MRI, has implantable device for sleep apnea    Not using narcotics from our office    Spine surgery recommended spinal cord stimulator    Requesting Toradol injection for back and joint pain leg pain radicular in nature considering additional lumbar procedures    Toradol 60 mg IM, tolerated well    He had lumbar L4 L5/S1 TFESI left side.  September 2021    Lumbar myelogram [facility] in 19 2022 degenerative changes L3/4 bulging disc no central canal stenosis noted moderate left and mild right foraminal stenosis  L5/S1 mild right foraminal stenosis    X-ray lumbar spine ARM February 2022 postoperative changes noted scoliosis noted mild, diffuse demineralization multiple level degenerative changes    Bone density study [facility] May 2022 osteopenia associated with least 2 times increased risk for fracture    Requesting to increase Lyrica     Lyrica 100 mg 1 p.o. q.8 hours    Tizanidine 4 mg 1 p.o. q.8 hours    Continue home exercise program as directed    Follow-up p.r.n. patient request    Dr. Simms, September 2022

## 2023-06-13 ENCOUNTER — OFFICE VISIT (OUTPATIENT)
Dept: PAIN MEDICINE | Facility: CLINIC | Age: 63
End: 2023-06-13
Payer: OTHER GOVERNMENT

## 2023-06-13 VITALS
WEIGHT: 290 LBS | HEART RATE: 83 BPM | HEIGHT: 71 IN | DIASTOLIC BLOOD PRESSURE: 93 MMHG | RESPIRATION RATE: 18 BRPM | BODY MASS INDEX: 40.6 KG/M2 | SYSTOLIC BLOOD PRESSURE: 137 MMHG

## 2023-06-13 DIAGNOSIS — M54.17 LUMBOSACRAL RADICULOPATHY: Primary | Chronic | ICD-10-CM

## 2023-06-13 DIAGNOSIS — M54.6 THORACIC SPINE PAIN: Chronic | ICD-10-CM

## 2023-06-13 PROCEDURE — 96372 THER/PROPH/DIAG INJ SC/IM: CPT | Mod: PBBFAC | Performed by: PHYSICIAN ASSISTANT

## 2023-06-13 PROCEDURE — 99214 PR OFFICE/OUTPT VISIT, EST, LEVL IV, 30-39 MIN: ICD-10-PCS | Mod: S$PBB,25,, | Performed by: PHYSICIAN ASSISTANT

## 2023-06-13 PROCEDURE — 99214 OFFICE O/P EST MOD 30 MIN: CPT | Mod: S$PBB,25,, | Performed by: PHYSICIAN ASSISTANT

## 2023-06-13 PROCEDURE — 99214 OFFICE O/P EST MOD 30 MIN: CPT | Mod: PBBFAC | Performed by: PHYSICIAN ASSISTANT

## 2023-06-13 RX ORDER — LANOLIN ALCOHOL/MO/W.PET/CERES
1000 CREAM (GRAM) TOPICAL
COMMUNITY
Start: 2023-01-16

## 2023-06-13 RX ORDER — ASPIRIN 81 MG/1
81 TABLET ORAL
COMMUNITY
Start: 2023-04-24

## 2023-06-13 RX ORDER — TIZANIDINE HYDROCHLORIDE 4 MG/1
4 CAPSULE, GELATIN COATED ORAL EVERY 8 HOURS PRN
Qty: 90 CAPSULE | Refills: 0 | Status: SHIPPED | OUTPATIENT
Start: 2023-06-13 | End: 2024-02-27 | Stop reason: SDUPTHER

## 2023-06-13 RX ORDER — DICLOFENAC SODIUM 10 MG/G
GEL TOPICAL
COMMUNITY
Start: 2023-03-12

## 2023-06-13 RX ORDER — KETOROLAC TROMETHAMINE 30 MG/ML
60 INJECTION, SOLUTION INTRAMUSCULAR; INTRAVENOUS
Status: COMPLETED | OUTPATIENT
Start: 2023-06-13 | End: 2023-06-13

## 2023-06-13 RX ORDER — PREGABALIN 100 MG/1
100 CAPSULE ORAL 3 TIMES DAILY
Qty: 90 CAPSULE | Refills: 0 | Status: SHIPPED | OUTPATIENT
Start: 2023-06-13 | End: 2023-08-07 | Stop reason: SDUPTHER

## 2023-06-13 RX ADMIN — KETOROLAC TROMETHAMINE 60 MG: 60 INJECTION, SOLUTION INTRAMUSCULAR at 10:06

## 2023-07-28 DIAGNOSIS — K63.9 BOWEL TROUBLE: Primary | ICD-10-CM

## 2023-08-03 ENCOUNTER — OFFICE VISIT (OUTPATIENT)
Dept: GASTROENTEROLOGY | Facility: CLINIC | Age: 63
End: 2023-08-03
Payer: OTHER GOVERNMENT

## 2023-08-03 ENCOUNTER — TELEPHONE (OUTPATIENT)
Dept: GASTROENTEROLOGY | Facility: CLINIC | Age: 63
End: 2023-08-03
Payer: OTHER GOVERNMENT

## 2023-08-03 ENCOUNTER — HOSPITAL ENCOUNTER (OUTPATIENT)
Dept: RADIOLOGY | Facility: HOSPITAL | Age: 63
Discharge: HOME | End: 2023-08-03
Attending: NURSE PRACTITIONER
Payer: OTHER GOVERNMENT

## 2023-08-03 VITALS
HEIGHT: 71 IN | DIASTOLIC BLOOD PRESSURE: 84 MMHG | SYSTOLIC BLOOD PRESSURE: 154 MMHG | BODY MASS INDEX: 41.21 KG/M2 | WEIGHT: 294.38 LBS

## 2023-08-03 DIAGNOSIS — K59.00 CONSTIPATION, UNSPECIFIED CONSTIPATION TYPE: ICD-10-CM

## 2023-08-03 DIAGNOSIS — K59.00 CONSTIPATION, UNSPECIFIED CONSTIPATION TYPE: Primary | ICD-10-CM

## 2023-08-03 DIAGNOSIS — R19.7 DIARRHEA, UNSPECIFIED TYPE: ICD-10-CM

## 2023-08-03 DIAGNOSIS — K63.9 BOWEL TROUBLE: ICD-10-CM

## 2023-08-03 PROCEDURE — 99214 OFFICE O/P EST MOD 30 MIN: CPT | Mod: PBBFAC | Performed by: NURSE PRACTITIONER

## 2023-08-03 PROCEDURE — 74018 RADEX ABDOMEN 1 VIEW: CPT | Mod: 26,,, | Performed by: RADIOLOGY

## 2023-08-03 PROCEDURE — 99214 PR OFFICE/OUTPT VISIT, EST, LEVL IV, 30-39 MIN: ICD-10-PCS | Mod: S$PBB,,, | Performed by: NURSE PRACTITIONER

## 2023-08-03 PROCEDURE — 74018 RADEX ABDOMEN 1 VIEW: CPT | Mod: TC

## 2023-08-03 PROCEDURE — 99214 OFFICE O/P EST MOD 30 MIN: CPT | Mod: S$PBB,,, | Performed by: NURSE PRACTITIONER

## 2023-08-03 PROCEDURE — 74018 XR KUB: ICD-10-PCS | Mod: 26,,, | Performed by: RADIOLOGY

## 2023-08-03 RX ORDER — TIZANIDINE HYDROCHLORIDE 4 MG/1
4 CAPSULE, GELATIN COATED ORAL EVERY 8 HOURS PRN
Qty: 90 CAPSULE | Refills: 0 | Status: CANCELLED | OUTPATIENT
Start: 2023-08-03

## 2023-08-03 RX ORDER — PREGABALIN 100 MG/1
100 CAPSULE ORAL 3 TIMES DAILY
Qty: 90 CAPSULE | Refills: 0 | Status: CANCELLED | OUTPATIENT
Start: 2023-08-03

## 2023-08-03 NOTE — PROGRESS NOTES
Ant Webber is a 62 y.o. male here for No chief complaint on file.        PCP: Bridgett Johnson  Referring Provider: Order, Paper  No address on file     HPI:  Presents with report of change in bowel movements.  Endorses constipation with occasional diarrhea.  Stools has changed shapes.  Reports narrow stools.  Last colonoscopy was 01/12/2021, sessile serrated adenoma with recommendations to repeat in 3 years.  Patient also has a family history of colon cancer in his father.  He denies any hematochezia or melena.  No weight loss.  Appetite is good.  Patient reports that he takes MiraLax powder intermittently for constipation.  We did discuss the possibility of overflow diarrhea.          ROS:  Review of Systems   Constitutional:  Negative for activity change, appetite change, fatigue, fever and unexpected weight change.   HENT:  Negative for trouble swallowing.    Respiratory:  Negative for cough.    Cardiovascular:  Negative for chest pain.   Gastrointestinal:  Positive for constipation. Negative for abdominal distention, abdominal pain, blood in stool, change in bowel habit, diarrhea, nausea, vomiting, reflux and change in bowel habit.   Musculoskeletal:  Negative for gait problem.   Integumentary:  Negative for color change.   Neurological:  Negative for light-headedness.   Psychiatric/Behavioral:  Negative for sleep disturbance. The patient is not nervous/anxious.           PMHX:  has a past medical history of Diabetes, Hypertension, Lumbar post-laminectomy syndrome, Lumbosacral radiculopathy, and Sleep apnea.    PSHX:  has a past surgical history that includes Hip arthroscopy w/ labral debridement (2020); Back surgery (2013); Hand surgery (2003); Tonsillectomy; Umbilical hernia repair; Cholecystotomy; L5-S1 SUSAN (02/27/2019); Bilateral L4-L5 TFESI (Bilateral, 10-, 8-2-2017, 6-); Left L4-L5 TFESI (Left, 8-, 7-, 6-8-2016); Bilateral L5-S1 TFESI (Bilateral, 05/15/2019); and  "Selective injection of anesthetic agent around lumbar spinal nerve root by transforaminal approach (Left, 9/16/2021).    PFHX: family history includes Diabetes in his father; Hypertension in his mother; Parkinsonism in his father and mother.    PSlHX:  reports that he quit smoking about 24 years ago. His smoking use included cigarettes. He quit smokeless tobacco use about 14 years ago.  His smokeless tobacco use included snuff. He reports current alcohol use. He reports that he does not use drugs.        Review of patient's allergies indicates:  No Known Allergies    Medication List with Changes/Refills   Current Medications    AMLODIPINE (NORVASC) 2.5 MG TABLET    Take 2.5 mg by mouth once daily.    ASPIRIN (ECOTRIN) 81 MG EC TABLET    81 mg.    ATORVASTATIN (LIPITOR) 20 MG TABLET    TAKE ONE-HALF TABLET BY MOUTH DAILY FOR CHOLESTEROL. STOP MEDICATION AND CALL PROVIDER FOR ANY UNEXPLAINED MUSCLE ACHES,PAIN OR WEAKNESS    AZITHROMYCIN (Z-SREE) 250 MG TABLET    Take 1 tablet (250 mg total) by mouth once daily. Take first 2 tablets together, then 1 every day until finished.    CETIRIZINE (ZYRTEC) 10 MG TABLET    Take 10 mg by mouth.    CYANOCOBALAMIN (VITAMIN B-12) 1000 MCG TABLET    1,000 mcg.    DICLOFENAC SODIUM (VOLTAREN) 1 % GEL    APPLY 2 GRAMS TO AFFECTED AREA(S) 2 TIMES A DAY AS NEEDED FOR PAIN AND INFLAMMATION    METFORMIN (FORTAMET) 1,000 MG 24HR TABLET    Take 1,000 mg by mouth 2 (two) times daily with meals.    PREGABALIN (LYRICA) 100 MG CAPSULE    Take 1 capsule (100 mg total) by mouth 3 (three) times daily.    TIZANIDINE 4 MG CAP    Take 4 mg by mouth every 8 (eight) hours as needed (Spasm).        Objective Findings:  Vital Signs:  BP (!) 154/84   Ht 5' 11" (1.803 m)   Wt 133.5 kg (294 lb 6.4 oz)   BMI 41.06 kg/m²  Body mass index is 41.06 kg/m².    Physical Exam:  Physical Exam  Vitals and nursing note reviewed.   Constitutional:       General: He is not in acute distress.     Appearance: Normal " appearance. He is obese.   HENT:      Mouth/Throat:      Mouth: Mucous membranes are moist.   Cardiovascular:      Rate and Rhythm: Normal rate.   Pulmonary:      Effort: Pulmonary effort is normal.      Breath sounds: No wheezing, rhonchi or rales.   Abdominal:      General: Bowel sounds are normal. There is no distension.      Palpations: Abdomen is soft. There is no mass.      Tenderness: There is no abdominal tenderness. There is no guarding.   Skin:     General: Skin is warm and dry.      Coloration: Skin is not jaundiced or pale.   Neurological:      Mental Status: He is alert and oriented to person, place, and time.   Psychiatric:         Mood and Affect: Mood normal.          Labs:  Lab Results   Component Value Date    WBC 6.62 08/03/2023    HGB 15.0 08/03/2023    HCT 47.3 08/03/2023    MCV 92.9 08/03/2023    RDW 13.6 08/03/2023     08/03/2023    LYMPH 7.3 (L) 08/03/2023    LYMPH 0.48 (L) 08/03/2023    MONO 10.9 (H) 08/03/2023    EOS 0.37 08/03/2023    BASO 0.07 08/03/2023     Lab Results   Component Value Date     08/03/2023    K 4.5 08/03/2023     (H) 08/03/2023    CO2 27 08/03/2023     (H) 08/03/2023    BUN 16 08/03/2023    CREATININE 1.05 08/03/2023    CALCIUM 9.6 08/03/2023    PROT 7.5 08/03/2023    ALBUMIN 3.5 08/03/2023    BILITOT 0.3 08/03/2023    ALKPHOS 66 08/03/2023    AST 21 08/03/2023    ALT 42 08/03/2023         Imaging: No results found.      Assessment:  Ant Webber is a 62 y.o. male here with:  1. Constipation, unspecified constipation type    2. Diarrhea, unspecified type    3. Bowel trouble          Recommendations:  1. Miralax powder 17 grams in 8 ounces of liquid daily  2. KUB  3. CBC, CMP, stool for blood    Follow up in about 4 weeks (around 8/31/2023).      Order summary:  Orders Placed This Encounter    X-Ray KUB    CBC Auto Differential    Comprehensive Metabolic Panel    Occult blood x 1, stool       Thank you for allowing me to participate in the care  of Ant Webber.      Cally Jorge, FNP-C

## 2023-08-03 NOTE — PROGRESS NOTES
Subjective:         Patient ID: Ant Webber is a 62 y.o. male.    Chief Complaint: Low-back Pain and Leg Pain        Pain  This is a chronic problem. The current episode started more than 1 year ago. The problem occurs daily. The problem has been waxing and waning. Associated symptoms include arthralgias. Pertinent negatives include no anorexia, chest pain, chills, coughing, diaphoresis, fever, sore throat, vertigo or vomiting.     Review of Systems   Constitutional:  Negative for activity change, appetite change, chills, diaphoresis, fever and unexpected weight change.   HENT:  Negative for drooling, ear discharge, ear pain, facial swelling, mouth dryness, nosebleeds, sore throat, trouble swallowing, voice change and goiter.    Eyes:  Negative for photophobia, pain, discharge, redness and visual disturbance.   Respiratory:  Negative for apnea, cough, choking, chest tightness, shortness of breath, wheezing and stridor.    Cardiovascular:  Negative for chest pain, palpitations and leg swelling.   Gastrointestinal:  Negative for abdominal distention, anorexia, diarrhea, rectal pain, vomiting and fecal incontinence.   Endocrine: Negative for cold intolerance, heat intolerance, polydipsia, polyphagia and polyuria.   Genitourinary:  Negative for bladder incontinence, dysuria, flank pain and frequency.   Musculoskeletal:  Positive for arthralgias, back pain and leg pain.   Integumentary:  Negative for color change and pallor.   Neurological:  Negative for dizziness, vertigo, seizures, syncope, facial asymmetry, speech difficulty, light-headedness, coordination difficulties, memory loss and coordination difficulties.   Hematological:  Negative for adenopathy. Does not bruise/bleed easily.   Psychiatric/Behavioral:  Negative for agitation, behavioral problems, confusion, decreased concentration, dysphoric mood, hallucinations and suicidal ideas. The patient is not nervous/anxious and is not hyperactive.            Past  "Medical History:   Diagnosis Date    Diabetes     Hypertension     Lumbar post-laminectomy syndrome     Lumbosacral radiculopathy     Sleep apnea      Past Surgical History:   Procedure Laterality Date    BACK SURGERY  2013    Bilateral L4-L5 TFESI Bilateral 10-, 2017, 2017    Dr Simms    Bilateral L5-S1 TFESI Bilateral 05/15/2019    Dr Simms    CHOLECYSTOTOMY      HAND SURGERY      HIP ARTHROSCOPY W/ LABRAL DEBRIDEMENT      L5-S1 SUSAN  2019    Dr Simms    Left L4-L5 TFESI Left 8-, 2016, 2016    Dr Simms    SELECTIVE INJECTION OF ANESTHETIC AGENT AROUND LUMBAR SPINAL NERVE ROOT BY TRANSFORAMINAL APPROACH Left 2021    Procedure: Left L5-S1 TFESI;  Surgeon: Nani Simms MD;  Location: [facility];  Service: Pain Management;  Laterality: Left;  NO VAC   WILL BE TESTED    RECENTLY HAD COVID    TONSILLECTOMY      UMBILICAL HERNIA REPAIR       Social History     Socioeconomic History    Marital status:    Occupational History    Occupation: retail sales   Tobacco Use    Smoking status: Former     Current packs/day: 0.00     Types: Cigarettes     Quit date:      Years since quittin.6    Smokeless tobacco: Former     Types: Snuff     Quit date:    Substance and Sexual Activity    Alcohol use: Yes     Comment: occasionally    Drug use: Never     Family History   Problem Relation Age of Onset    Parkinsonism Mother     Hypertension Mother     Diabetes Father     Parkinsonism Father      Review of patient's allergies indicates:  No Known Allergies     Objective:  Vitals:    23 0856   BP: (!) 154/79   Pulse: 87   Resp: 18   Weight: 132.5 kg (292 lb)   Height: 5' 11" (1.803 m)   PainSc:   5         Physical Exam  Vitals and nursing note reviewed. Exam conducted with a chaperone present.   Constitutional:       General: He is awake. He is not in acute distress.     Appearance: Normal appearance. He is not ill-appearing or diaphoretic. "   HENT:      Head: Normocephalic and atraumatic.      Nose: Nose normal.      Mouth/Throat:      Mouth: Mucous membranes are moist.      Pharynx: Oropharynx is clear.   Eyes:      Conjunctiva/sclera: Conjunctivae normal.      Pupils: Pupils are equal, round, and reactive to light.   Cardiovascular:      Rate and Rhythm: Normal rate.   Pulmonary:      Effort: Pulmonary effort is normal. No respiratory distress.   Abdominal:      Palpations: Abdomen is soft.      Tenderness: There is no guarding.   Musculoskeletal:         General: Normal range of motion.      Cervical back: Normal range of motion and neck supple. No rigidity.      Thoracic back: Tenderness present.      Lumbar back: Tenderness present.   Skin:     General: Skin is warm and dry.      Coloration: Skin is not jaundiced or pale.   Neurological:      General: No focal deficit present.      Mental Status: He is alert and oriented to person, place, and time. Mental status is at baseline.      Cranial Nerves: No cranial nerve deficit (II-XII).   Psychiatric:         Mood and Affect: Mood normal.         Behavior: Behavior normal. Behavior is cooperative.         Thought Content: Thought content normal.           X-Ray KUB  Narrative: EXAMINATION:  XR KUB    CLINICAL HISTORY:  Abdominal pain    COMPARISON:  None available    TECHNIQUE:  XR KUB    FINDINGS:  No free fluid or free air seen.  The bowel gas pattern appears within normal limits.  No abnormal calcifications are present.  Clips and anchors from previous surgery.  No other abnormality is identified.  Impression: No evidence of abnormality demonstrated    Electronically signed by: Timur Sandoval  Date:    08/03/2023  Time:    09:45         Lab Visit on 08/03/2023   Component Date Value Ref Range Status    Sodium 08/03/2023 140  136 - 145 mmol/L Final    Potassium 08/03/2023 4.5  3.5 - 5.1 mmol/L Final    Chloride 08/03/2023 109 (H)  98 - 107 mmol/L Final    CO2 08/03/2023 27  21 - 32 mmol/L Final     Anion Gap 08/03/2023 9  7 - 16 mmol/L Final    Glucose 08/03/2023 146 (H)  74 - 106 mg/dL Final    BUN 08/03/2023 16  7 - 18 mg/dL Final    Creatinine 08/03/2023 1.05  0.70 - 1.30 mg/dL Final    BUN/Creatinine Ratio 08/03/2023 15  6 - 20 Final    Calcium 08/03/2023 9.6  8.5 - 10.1 mg/dL Final    Total Protein 08/03/2023 7.5  6.4 - 8.2 g/dL Final    Albumin 08/03/2023 3.5  3.5 - 5.0 g/dL Final    Globulin 08/03/2023 4.0  2.0 - 4.0 g/dL Final    A/G Ratio 08/03/2023 0.9   Final    Bilirubin, Total 08/03/2023 0.3  >0.0 - 1.2 mg/dL Final    Alk Phos 08/03/2023 66  45 - 115 U/L Final    ALT 08/03/2023 42  16 - 61 U/L Final    AST 08/03/2023 21  15 - 37 U/L Final    eGFR 08/03/2023 80  >=60 mL/min/1.73m2 Final    WBC 08/03/2023 6.62  4.50 - 11.00 K/uL Final    RBC 08/03/2023 5.09  4.60 - 6.20 M/uL Final    Hemoglobin 08/03/2023 15.0  13.5 - 18.0 g/dL Final    Hematocrit 08/03/2023 47.3  40.0 - 54.0 % Final    MCV 08/03/2023 92.9  80.0 - 96.0 fL Final    MCH 08/03/2023 29.5  27.0 - 31.0 pg Final    MCHC 08/03/2023 31.7 (L)  32.0 - 36.0 g/dL Final    RDW 08/03/2023 13.6  11.5 - 14.5 % Final    Platelet Count 08/03/2023 307  150 - 400 K/uL Final    MPV 08/03/2023 9.6  9.4 - 12.4 fL Final    Neutrophils % 08/03/2023 74.5 (H)  53.0 - 65.0 % Final    Lymphocytes % 08/03/2023 7.3 (L)  27.0 - 41.0 % Final    Monocytes % 08/03/2023 10.9 (H)  2.0 - 6.0 % Final    Eosinophils % 08/03/2023 5.6 (H)  1.0 - 4.0 % Final    Basophils % 08/03/2023 1.1 (H)  0.0 - 1.0 % Final    Immature Granulocytes % 08/03/2023 0.6 (H)  0.0 - 0.4 % Final    nRBC, Auto 08/03/2023 0.0  <=0.0 % Final    Neutrophils, Abs 08/03/2023 4.94  1.80 - 7.70 K/uL Final    Lymphocytes, Absolute 08/03/2023 0.48 (L)  1.00 - 4.80 K/uL Final    Monocytes, Absolute 08/03/2023 0.72  0.00 - 0.80 K/uL Final    Eosinophils, Absolute 08/03/2023 0.37  0.00 - 0.50 K/uL Final    Basophils, Absolute 08/03/2023 0.07  0.00 - 0.20 K/uL Final    Immature Granulocytes, Absolute 08/03/2023  0.04  0.00 - 0.04 K/uL Final    nRBC, Absolute 08/03/2023 0.00  <=0.00 x10e3/uL Final    Diff Type 08/03/2023 Auto   Final         Orders Placed This Encounter   Procedures    Ambulatory referral/consult to Physical/Occupational Therapy     Standing Status:   Future     Standing Expiration Date:   9/4/2024     Referral Priority:   Routine     Referral Type:   Physical Medicine     Referral Reason:   Specialty Services Required     Requested Specialty:   Physical Therapy     Number of Visits Requested:   1         Requested Prescriptions      No prescriptions requested or ordered in this encounter       Assessment:     1. Lumbosacral radiculopathy    2. Thoracic spine pain    3. Neck pain           Lumbar myelogram [facility] in 19 2022 degenerative changes L3/4 bulging disc no central canal stenosis noted moderate left and mild right foraminal stenosis  L5/S1 mild right foraminal stenosis    X-ray lumbar spine [facility] February 2022 postoperative changes noted scoliosis noted mild, diffuse demineralization multiple level degenerative changes    Bone density study [facility] May 2022 osteopenia associated with least 2 times increased risk for fracture      Plan:    Not using narcotics from our office    History lumbar surgery 2013 [facility]    Cannot have MRI, has implantable device for sleep apnea    Spine surgery recommended spinal cord stimulator any further surgery last resort    Complaint of back pain joint pain    Denies loss of bowel or bladder function     Requesting resume physical therapy    Requesting Toradol injection    Toradol 60 mg IM, tolerated well    He had lumbar L4 L5/S1 TFESI left side.  September 2021    He states if needed he will consider repeat lumbar procedure     Would like to continue conservative management    Continue home exercise program as directed    Follow-up p.r.n. patient request    Dr. Simms, September 2022

## 2023-08-04 ENCOUNTER — OFFICE VISIT (OUTPATIENT)
Dept: PAIN MEDICINE | Facility: CLINIC | Age: 63
End: 2023-08-04
Payer: OTHER GOVERNMENT

## 2023-08-04 VITALS
SYSTOLIC BLOOD PRESSURE: 154 MMHG | HEART RATE: 87 BPM | BODY MASS INDEX: 40.88 KG/M2 | HEIGHT: 71 IN | WEIGHT: 292 LBS | DIASTOLIC BLOOD PRESSURE: 79 MMHG | RESPIRATION RATE: 18 BRPM

## 2023-08-04 DIAGNOSIS — M54.6 THORACIC SPINE PAIN: Chronic | ICD-10-CM

## 2023-08-04 DIAGNOSIS — M54.2 NECK PAIN: ICD-10-CM

## 2023-08-04 DIAGNOSIS — M54.17 LUMBOSACRAL RADICULOPATHY: Primary | Chronic | ICD-10-CM

## 2023-08-04 LAB — OCCULT BLOOD: NEGATIVE

## 2023-08-04 PROCEDURE — 96372 THER/PROPH/DIAG INJ SC/IM: CPT | Mod: PBBFAC | Performed by: PHYSICIAN ASSISTANT

## 2023-08-04 PROCEDURE — 99215 OFFICE O/P EST HI 40 MIN: CPT | Mod: PBBFAC,25 | Performed by: PHYSICIAN ASSISTANT

## 2023-08-04 PROCEDURE — 99214 PR OFFICE/OUTPT VISIT, EST, LEVL IV, 30-39 MIN: ICD-10-PCS | Mod: S$PBB,25,, | Performed by: PHYSICIAN ASSISTANT

## 2023-08-04 PROCEDURE — 82272 OCCULT BLD FECES 1-3 TESTS: CPT | Mod: QW,,, | Performed by: CLINICAL MEDICAL LABORATORY

## 2023-08-04 PROCEDURE — 99214 OFFICE O/P EST MOD 30 MIN: CPT | Mod: S$PBB,25,, | Performed by: PHYSICIAN ASSISTANT

## 2023-08-04 PROCEDURE — 82272 OCCULT BLOOD X 1, STOOL: ICD-10-PCS | Mod: QW,,, | Performed by: CLINICAL MEDICAL LABORATORY

## 2023-08-04 RX ORDER — KETOROLAC TROMETHAMINE 30 MG/ML
60 INJECTION, SOLUTION INTRAMUSCULAR; INTRAVENOUS
Status: COMPLETED | OUTPATIENT
Start: 2023-08-04 | End: 2023-08-04

## 2023-08-04 RX ADMIN — KETOROLAC TROMETHAMINE 60 MG: 30 INJECTION, SOLUTION INTRAMUSCULAR at 09:08

## 2023-08-07 DIAGNOSIS — M54.17 LUMBOSACRAL RADICULOPATHY: Chronic | ICD-10-CM

## 2023-08-07 DIAGNOSIS — M54.6 THORACIC SPINE PAIN: Chronic | ICD-10-CM

## 2023-08-07 RX ORDER — PREGABALIN 100 MG/1
100 CAPSULE ORAL 3 TIMES DAILY
Qty: 90 CAPSULE | Refills: 0 | Status: SHIPPED | OUTPATIENT
Start: 2023-08-07 | End: 2023-09-27 | Stop reason: SDUPTHER

## 2023-08-07 NOTE — TELEPHONE ENCOUNTER
----- Message from Светлана Morel sent at 8/7/2023  8:40 AM CDT -----  [phone] PATIENT CALLED SAID HE NEEDS A REFILL ON HIS PREGABLIN  KRUNAL MAGALLANES   08/07/2023   9:55 AM   Will send prescription to [facility] in [place]

## 2023-08-31 ENCOUNTER — OFFICE VISIT (OUTPATIENT)
Dept: GASTROENTEROLOGY | Facility: CLINIC | Age: 63
End: 2023-08-31
Payer: OTHER GOVERNMENT

## 2023-08-31 VITALS
WEIGHT: 295.63 LBS | SYSTOLIC BLOOD PRESSURE: 136 MMHG | DIASTOLIC BLOOD PRESSURE: 83 MMHG | HEIGHT: 71 IN | BODY MASS INDEX: 41.39 KG/M2 | HEART RATE: 90 BPM

## 2023-08-31 DIAGNOSIS — K59.00 CONSTIPATION, UNSPECIFIED CONSTIPATION TYPE: Primary | ICD-10-CM

## 2023-08-31 PROCEDURE — 99214 OFFICE O/P EST MOD 30 MIN: CPT | Mod: PBBFAC | Performed by: NURSE PRACTITIONER

## 2023-08-31 PROCEDURE — 99213 PR OFFICE/OUTPT VISIT, EST, LEVL III, 20-29 MIN: ICD-10-PCS | Mod: S$PBB,,, | Performed by: NURSE PRACTITIONER

## 2023-08-31 PROCEDURE — 99213 OFFICE O/P EST LOW 20 MIN: CPT | Mod: S$PBB,,, | Performed by: NURSE PRACTITIONER

## 2023-08-31 NOTE — PROGRESS NOTES
Ant Webber is a 62 y.o. male here for Follow-up        PCP: Bridgett Johnson  Referring Provider: No referring provider defined for this encounter.     HPI:  Presents for follow-up due to constipation.  Patient had a KUB at the last office visit on 08/03, KUB films were reviewed by Dr. Crooks, large volume of stool noted in the ascending as well as the descending colon.  At the time patient was reporting right-sided abdominal pain that was consistent for the location of constipation.  He has been taking MiraLax powder 17 g daily and states that his abdominal pain has improved.  Reports occasional intermittent discomfort.  Offered patient CT abdomen and pelvis for continued abdominal pain.  At this time the patient does not feel like the pain warrants additional imaging.  States that he continues to feel that he has some constipation.  Reports incomplete emptying at times.  Labs from 08/03 reviewed, no anemia, liver enzymes are normal.  Stool is negative for occult blood.  Last colonoscopy was 01/12/2021, sessile serrated adenoma was removed.  He will be due for a colonoscopy next year.    Follow-up  Pertinent negatives include no abdominal pain (improved), change in bowel habit, chest pain, coughing, fatigue, fever, nausea or vomiting.         ROS:  Review of Systems   Constitutional:  Negative for activity change, appetite change, fatigue, fever and unexpected weight change.   HENT:  Negative for trouble swallowing.    Respiratory:  Negative for cough.    Cardiovascular:  Negative for chest pain.   Gastrointestinal:  Positive for constipation. Negative for abdominal distention, abdominal pain (improved), blood in stool, change in bowel habit, diarrhea, nausea, vomiting, reflux and change in bowel habit.   Musculoskeletal:  Negative for gait problem.   Integumentary:  Negative for color change.   Psychiatric/Behavioral:  Negative for sleep disturbance.           PMHX:  has a past medical history of Diabetes,  Hypertension, Lumbar post-laminectomy syndrome, Lumbosacral radiculopathy, and Sleep apnea.    PSHX:  has a past surgical history that includes Hip arthroscopy w/ labral debridement (2020); Back surgery (2013); Hand surgery (2003); Tonsillectomy; Umbilical hernia repair; Cholecystotomy; L5-S1 SUSAN (02/27/2019); Bilateral L4-L5 TFESI (Bilateral, 10-, 8-2-2017, 6-); Left L4-L5 TFESI (Left, 8-, 7-, 6-8-2016); Bilateral L5-S1 TFESI (Bilateral, 05/15/2019); and Selective injection of anesthetic agent around lumbar spinal nerve root by transforaminal approach (Left, 9/16/2021).    PFHX: family history includes Diabetes in his father; Hypertension in his mother; Parkinsonism in his father and mother.    PSlHX:  reports that he quit smoking about 24 years ago. His smoking use included cigarettes. He quit smokeless tobacco use about 14 years ago.  His smokeless tobacco use included snuff. He reports current alcohol use. He reports that he does not use drugs.        Review of patient's allergies indicates:  No Known Allergies    Medication List with Changes/Refills   Current Medications    AMLODIPINE (NORVASC) 2.5 MG TABLET    Take 2.5 mg by mouth once daily.    ASPIRIN (ECOTRIN) 81 MG EC TABLET    81 mg.    ATORVASTATIN (LIPITOR) 20 MG TABLET    TAKE ONE-HALF TABLET BY MOUTH DAILY FOR CHOLESTEROL. STOP MEDICATION AND CALL PROVIDER FOR ANY UNEXPLAINED MUSCLE ACHES,PAIN OR WEAKNESS    AZITHROMYCIN (Z-SREE) 250 MG TABLET    Take 1 tablet (250 mg total) by mouth once daily. Take first 2 tablets together, then 1 every day until finished.    CETIRIZINE (ZYRTEC) 10 MG TABLET    Take 10 mg by mouth.    CYANOCOBALAMIN (VITAMIN B-12) 1000 MCG TABLET    1,000 mcg.    DICLOFENAC SODIUM (VOLTAREN) 1 % GEL    APPLY 2 GRAMS TO AFFECTED AREA(S) 2 TIMES A DAY AS NEEDED FOR PAIN AND INFLAMMATION    METFORMIN (FORTAMET) 1,000 MG 24HR TABLET    Take 1,000 mg by mouth 2 (two) times daily with meals.    PREGABALIN  "(LYRICA) 100 MG CAPSULE    Take 1 capsule (100 mg total) by mouth 3 (three) times daily.    TIZANIDINE 4 MG CAP    Take 4 mg by mouth every 8 (eight) hours as needed (Spasm).        Objective Findings:  Vital Signs:  /83   Pulse 90   Ht 5' 11" (1.803 m)   Wt 134.1 kg (295 lb 9.6 oz)   BMI 41.23 kg/m²  Body mass index is 41.23 kg/m².    Physical Exam:  Physical Exam  Vitals and nursing note reviewed.   Constitutional:       General: He is not in acute distress.     Appearance: Normal appearance. He is obese.   HENT:      Mouth/Throat:      Mouth: Mucous membranes are moist.   Cardiovascular:      Rate and Rhythm: Normal rate.   Pulmonary:      Effort: Pulmonary effort is normal.      Breath sounds: No wheezing, rhonchi or rales.   Abdominal:      General: Abdomen is protuberant. Bowel sounds are normal. There is no distension.      Palpations: Abdomen is soft. There is no mass.      Tenderness: There is no abdominal tenderness. There is no guarding.   Skin:     General: Skin is warm and dry.      Coloration: Skin is not jaundiced or pale.   Neurological:      Mental Status: He is alert and oriented to person, place, and time.   Psychiatric:         Mood and Affect: Mood normal.          Labs:  Lab Results   Component Value Date    WBC 6.62 08/03/2023    HGB 15.0 08/03/2023    HCT 47.3 08/03/2023    MCV 92.9 08/03/2023    RDW 13.6 08/03/2023     08/03/2023    LYMPH 7.3 (L) 08/03/2023    LYMPH 0.48 (L) 08/03/2023    MONO 10.9 (H) 08/03/2023    EOS 0.37 08/03/2023    BASO 0.07 08/03/2023     Lab Results   Component Value Date     08/03/2023    K 4.5 08/03/2023     (H) 08/03/2023    CO2 27 08/03/2023     (H) 08/03/2023    BUN 16 08/03/2023    CREATININE 1.05 08/03/2023    CALCIUM 9.6 08/03/2023    PROT 7.5 08/03/2023    ALBUMIN 3.5 08/03/2023    BILITOT 0.3 08/03/2023    ALKPHOS 66 08/03/2023    AST 21 08/03/2023    ALT 42 08/03/2023         Imaging: X-Ray KUB    Result Date: " 8/3/2023  EXAMINATION: XR KUB CLINICAL HISTORY: Abdominal pain COMPARISON: None available TECHNIQUE: XR KUB FINDINGS: No free fluid or free air seen.  The bowel gas pattern appears within normal limits.  No abnormal calcifications are present.  Clips and anchors from previous surgery.  No other abnormality is identified.     No evidence of abnormality demonstrated Electronically signed by: Timur Sandoval Date:    08/03/2023 Time:    09:45        Assessment:  Ant Webber is a 62 y.o. male here with:  1. Constipation, unspecified constipation type          Recommendations:  1. Increase MiraLax powder to 17 g twice daily  2. Water intake of 64 oz per day  3. To kiwi fruit per day  4. Consider CT abdomen and pelvis if abdominal pain continues    Follow up in about 3 months (around 11/30/2023).      Order summary:       Thank you for allowing me to participate in the care of Ant Webber.      SILVESTRE Terrazas

## 2023-09-05 DIAGNOSIS — M54.17 RADICULOPATHY, LUMBOSACRAL REGION: Primary | ICD-10-CM

## 2023-09-12 ENCOUNTER — CLINICAL SUPPORT (OUTPATIENT)
Dept: REHABILITATION | Facility: HOSPITAL | Age: 63
End: 2023-09-12
Payer: OTHER GOVERNMENT

## 2023-09-12 DIAGNOSIS — R53.1 DECREASED STRENGTH: ICD-10-CM

## 2023-09-12 DIAGNOSIS — R52 PAIN: ICD-10-CM

## 2023-09-12 DIAGNOSIS — M54.17 RADICULOPATHY, LUMBOSACRAL REGION: Primary | ICD-10-CM

## 2023-09-12 PROCEDURE — 97161 PT EVAL LOW COMPLEX 20 MIN: CPT

## 2023-09-12 NOTE — PLAN OF CARE
[facility]  Physical Therapy Initial Evaluation    Date: 9/12/2023   Name: Ant Webber  Clinic Number: [id number]    Therapy Diagnosis:   Encounter Diagnoses   Name Primary?    Radiculopathy, lumbosacral region Yes    Pain     Decreased strength      Physician: Myesha Israel*    Physician Orders: PT Eval and Treat   Medical Diagnosis from Referral: Radiculopathy, lumbosacral region  Evaluation Date: 9/12/2023  Authorization Period Expiration: 1/10/24  Plan of Care Expiration: 12/5/23  Visit # / Visits authorized: 1/ 15    Time In: 920  Time Out: 945  Total Appointment Time (timed & untimed codes): 25 minutes    Precautions: Standard and Diabetes    Subjective   Date of onset: Chronic condition    History of current condition - Ant reports: Previous epidural in 9/2021. S/P left L4-5 laminectomy 2013.    DDD, bone spurs, scoliosis, OA. Constant throbbing, dull ache in the low back. Intermittent sharp shooting, burning and electric shocks into bilateral legs with the left worse than the right. Right handed. Right hip impingement and left labral hip repair. No falls but does feel unstable with occasional knees buckling. Unable to sleep secondary to pain and interchanges between bed and recliner. Denies sensation deficits. No stairs. Worse with prolonged standing/walking. Relief with sitting/lying down. Shopping cart sign.   Job Duties - part time: [employer] - stand/sit  Hobbies: would like to fish if able.      Medical History:   Past Medical History:   Diagnosis Date    Diabetes     Hypertension     Lumbar post-laminectomy syndrome     Lumbosacral radiculopathy     Sleep apnea        Surgical History:   Ant Webber  has a past surgical history that includes Hip arthroscopy w/ labral debridement (2020); Back surgery (2013); Hand surgery (2003); Tonsillectomy; Umbilical hernia repair; Cholecystotomy; L5-S1 SUSAN (02/27/2019); Bilateral L4-L5 TFESI (Bilateral, 10-,  8-2-2017, 6-); Left L4-L5 TFESI (Left, 8-, 7-, 6-8-2016); Bilateral L5-S1 TFESI (Bilateral, 05/15/2019); and Selective injection of anesthetic agent around lumbar spinal nerve root by transforaminal approach (Left, 9/16/2021).    Medications:   Ant has a current medication list which includes the following prescription(s): amlodipine, aspirin, atorvastatin, azithromycin, cetirizine, cyanocobalamin, diclofenac sodium, metformin, pregabalin, and tizanidine.    Allergies:   Review of patient's allergies indicates:  No Known Allergies     Imaging, bone scan films: There is no obstruction of the flow of contrast within the thecal sac.     The vertebral body heights and alignment are maintained.  There is partial sacralization of L5 on the left.  Degenerative endplate and facet changes lower lumbar spine.  The conus terminates at T12-L1 level.  T11-12: No spinal canal or foraminal stenosis.  T12-L1: No spinal canal or foraminal stenosis.  L1-L2: No spinal canal or foraminal stenosis.  L2-L3: Disc bulge and facet degeneration.  No spinal canal stenosis.  Mild bilateral foraminal stenosis.  L3-L4: Previous left hemilaminectomy.  Disc bulging.  No spinal canal stenosis.  Facet degeneration.  Moderate left and mild right foraminal stenosis.  L4-5 disc osteophyte complex.  No spinal canal stenosis.  Moderate bilateral foraminal stenosis from facet degeneration and vertebral body osteophytes.  L5-S1: No spinal canal stenosis.  Facet degeneration.  Mild right foraminal stenosis.  Impression:  Multilevel degenerative changes of the lumbar spine as detailed.       Pain:  Current 5/10, worst 10/10,  Location: bilateral back    Description: Aching, Dull, Burning, Throbbing, and Shooting  Aggravating Factors: Sitting, Standing, Laying, Bending, Walking, and Night Time  Easing Factors: lying down and rest        Objective     Bilateral sloped shoulders - thoracic kyphosis  Bilateral single leg balance 1 second    Juddering and weak core with active lumbar forward flexion  5 sit to stand = 31 seconds  Decreased light touch over left L2-S1 dermatome  Manual muscle test right hip flexion 5/5, left hip flexion 4/5  Manual muscle test right hamstring 5/5, left hamstring 4/5  Manual muscle test right quad 5/5, left quad 4/5  Manual muscle test right glute med 5/5, left glute med 4/5  (-) bilateral sitting slump test but does demonstrate significant hamstring tightness  (-) bilateral straight leg raise test but does demonstrate significant hamstring ~ 40 degrees bilaterally  (+) left BETZY with hip tightness and pain  No quad lag  No leg length difference  Hypomobile lumbar pivm   (+) greater trochanter bilateral right worse than left       Limitation/Restriction for FOTO Survey    Therapist reviewed FOTO scores for Ant Webber on 9/12/2023.   FOTO documents entered into [employer] - see Media section.    Intake Score: 33%         Home Exercises and Patient Education Provided    Education provided:   Eval Findings  - Patient educated on biomechanical justification for therapeutic exercise and importance of compliance with HEP in order to improve overall impairments and QOL   Patient was educated on all the above exercise prior/during/after for proper posture, positioning, and execution for safe performance with home exercise program.   Patient educated on the importance of improved core and upper and lower extremity strength in order to improve alignment of the spine and upper and lower extremities with static positions and dynamic movement.   Patient educated on the importance of strong core and lower extremity musculature in order to improve both static and dynamic balance, improve gait mechanics, reduce fall risk and improve household and community mobility.     Written Home Exercises Provided:  eval findings .  Exercises were reviewed and Ant was able to demonstrate them prior to the end of the session.  Ant demonstrated good   understanding of the education provided.     See EMR under  -  for exercises provided  - .    Assessment   Ant is a 62 y.o. male referred to outpatient Physical Therapy with a medical diagnosis of lumbar pain. Patient presents with radicular bilateral symptoms that worsens with prolonged standing/walking and finds relief with lying down/sitting and medication. Patient has a history of low back pain with arthritis, s/p laminectomy and generalized low back pain with all activity. Patient has difficulty with sleeping through the night and with sit to stand from low chairs. Patient has had previous therapy and states he knows he needs strengthening and flexibility. Patient will benefit from skilled physical therapy to address deficits with core/hip stabilization, stretching, strengthening, myofascial release, manual, dry needling, aquatics and modalities as needed.     Patient prognosis is Fair.     Patient will benefit from skilled outpatient Physical Therapy to address the deficits stated above and in the chart below, provide patient /family education, and to maximize patientt's level of independence.     Plan of care discussed with patient: Yes  Patient's spiritual, cultural and educational needs considered and patient is agreeable to the plan of care and goals as stated below:     Anticipated Barriers for therapy: chronic    Goals:  Short Term Goals: 6 weeks   Patient will be able to sleep ~ 3 hours without waking from pain  Patient will improve manual muscle test to 5/5  Patient will maintain prolonged positions x 15 minutes with 6 /10 pain  Patient will be able to single leg balance x 5 seconds with no loss of balance for improved balance/proprioception    Long Term Goals: 12 weeks   Patient will be able to sleep through the night without waking from pain  Patient will improve sit to stand test to 20 seconds safely    Patient will maintain prolonged positions x 30 minutes with  4 /10 pain    Plan   Plan of care  Certification: 9/12/2023 to 12/5/23.    Outpatient Physical Therapy 2 times weekly for 12 weeks to include the following interventions: Aquatic Therapy, Hybresis with Dex, Cervical/Lumbar Traction, Electrical Stimulation IFC/Premod, Gait Training, Manual Therapy, Moist Heat/ Ice, Neuromuscular Re-ed, Patient Education, Self Care, Therapeutic Activities, Therapeutic Exercise, Ultrasound, and Dry Needling.     TALITA MURILLO, PT        I CERTIFY THE NEED FOR THESE SERVICES FURNISHED UNDER THIS PLAN OF TREATMENT AND WHILE UNDER MY CARE.    Physician's comments:      Physician's Signature: ____________________________________________Date________________        Please fax this MD signed form to:  [facility]  [phone] fax

## 2023-09-19 ENCOUNTER — CLINICAL SUPPORT (OUTPATIENT)
Dept: REHABILITATION | Facility: HOSPITAL | Age: 63
End: 2023-09-19
Payer: OTHER GOVERNMENT

## 2023-09-19 DIAGNOSIS — R52 PAIN: Primary | ICD-10-CM

## 2023-09-19 DIAGNOSIS — R53.1 DECREASED STRENGTH: ICD-10-CM

## 2023-09-19 PROCEDURE — 97110 THERAPEUTIC EXERCISES: CPT

## 2023-09-19 PROCEDURE — 97112 NEUROMUSCULAR REEDUCATION: CPT

## 2023-09-19 NOTE — PROGRESS NOTES
[facility]   Physical Therapy Treatment Note      Name: Ant Webber  Clinic Number: [id number]    Therapy Diagnosis:   Encounter Diagnoses   Name Primary?    Pain Yes    Decreased strength      Physician: Myesha Israel*  Physician Orders: PT Eval and Treat   Medical Diagnosis from Referral: Radiculopathy, lumbosacral region  Evaluation Date: 9/12/2023  Authorization Period Expiration: 1/10/24  Plan of Care Expiration: 12/5/23  Visit # / Visits authorized: 2/ 15      Visit Date: 9/19/2023    PTA Visit #: 0/5     Time In: 1000  Time Out: 1038  Total Billable Time: 38 minutes    Subjective     Pt reports: no change from evaluation.     He was compliant with home exercise program.    Pain: 4/10  Location: bilateral back      Objective      Objective Measures updated at progress report unless specified.     Treatment     Ant received the treatments listed below:      therapeutic exercises to develop strength, endurance, ROM, flexibility, posture, and core stabilization for 15 minutes including:  Calf stretch 3 x 30 sec  HS stretch on step 2 x 15 sec  Sitting ball trunk stretch x 5 each way    manual therapy techniques: - were applied to the: - for - minutes, including:  -    neuromuscular re-education activities to improve: Balance, Coordination, Kinesthetic, Sense, Proprioception, and Posture for 23 minutes. The following activities were included:  Trunk rotation w/ball x 10  HS rolls w/ball x 10  Bridge w/ball x 10  Cybex back extension 3pl x 20  Bilateral straight leg raise x 10    therapeutic activities to improve functional performance for -  minutes, including:  -    gait training to improve functional mobility and safety for -  minutes, including:  -    direct contact modalities after being cleared for contraindications:     supervised modalities after being cleared for contradictions:     hot pack for - minutes to -.    cold pack for - minutes to -.    Patient  Education and Home Exercises       Education provided:   - no new home exercise program added today    Written Home Exercises Provided:  fortinoal findings . Exercises were reviewed and Ant was able to demonstrate them prior to the end of the session.  Ant demonstrated good  understanding of the education provided. See EMR under Patient Instructions for exercises provided during therapy sessions    Assessment     Patient fatigued with exercises today. Focused on stretching and hip/core stabilization and strengthening. Patient required verbal cues for proper body mechiancs    Ant Is progressing well towards his goals.   Pt prognosis is Good.     Pt will continue to benefit from skilled outpatient physical therapy to address the deficits listed in the problem list box on initial evaluation, provide pt/family education and to maximize pt's level of independence in the home and community environment.     Pt's spiritual, cultural and educational needs considered and pt agreeable to plan of care and goals.     Anticipated barriers to physical therapy: none    Goals: Short Term Goals: 6 weeks   Patient will be able to sleep ~ 3 hours without waking from pain  Patient will improve manual muscle test to 5/5  Patient will maintain prolonged positions x 15 minutes with 6 /10 pain  Patient will be able to single leg balance x 5 seconds with no loss of balance for improved balance/proprioception    Long Term Goals: 12 weeks   Patient will be able to sleep through the night without waking from pain  Patient will improve sit to stand test to 20 seconds safely    Patient will maintain prolonged positions x 30 minutes with  4 /10 pain    Plan     Progress toward goals     TALITA MURILLO, PT

## 2023-09-22 ENCOUNTER — CLINICAL SUPPORT (OUTPATIENT)
Dept: REHABILITATION | Facility: HOSPITAL | Age: 63
End: 2023-09-22
Payer: OTHER GOVERNMENT

## 2023-09-22 DIAGNOSIS — R53.1 DECREASED STRENGTH: ICD-10-CM

## 2023-09-22 DIAGNOSIS — R52 PAIN: Primary | ICD-10-CM

## 2023-09-22 PROCEDURE — 97112 NEUROMUSCULAR REEDUCATION: CPT | Mod: CQ

## 2023-09-22 PROCEDURE — 97110 THERAPEUTIC EXERCISES: CPT | Mod: CQ

## 2023-09-22 NOTE — PROGRESS NOTES
[facility]   Physical Therapy Treatment Note      Name: Ant Webber  Clinic Number: [id number]    Therapy Diagnosis:   Encounter Diagnoses   Name Primary?    Pain Yes    Decreased strength      Physician: Myesha Israel*  Physician Orders: PT Eval and Treat   Medical Diagnosis from Referral: Radiculopathy, lumbosacral region  Evaluation Date: 9/12/2023  Authorization Period Expiration: 1/10/24  Plan of Care Expiration: 12/5/23  Visit # / Visits authorized: 3/ 15      Visit Date: 9/22/2023    PTA Visit #: 1/5     Time In: 9:54 am  Time Out: 10:32 am  Total Billable Time: 38 minutes    Subjective     Pt reports: Pain is a 3 or 4/10 today.    He was compliant with home exercise program.    Pain: 4/10  Location: bilateral back      Objective      Objective Measures updated at progress report unless specified.     Treatment     Ant received the treatments listed below:      therapeutic exercises to develop strength, endurance, ROM, flexibility, posture, and core stabilization for 15 minutes including:  NuStep 5 minutes  Calf stretch 3 x 30 sec  HS stretch on step 2 x 15 sec  Sitting ball trunk stretch x 5 each way    manual therapy techniques: - were applied to the: - for - minutes, including:  -    neuromuscular re-education activities to improve: Balance, Coordination, Kinesthetic, Sense, Proprioception, and Posture for 23 minutes. The following activities were included:  Trunk rotation w/ball x 10  HS rolls w/ball x 15  Bridge w/ball x 15  Cybex back extension 3pl 3 x 10   Bilateral straight leg raise x 10  Supine hip abduction 2 x 10 grey band     therapeutic activities to improve functional performance for -  minutes, including:  -    gait training to improve functional mobility and safety for -  minutes, including:  -    direct contact modalities after being cleared for contraindications:     supervised modalities after being cleared for contradictions:     hot pack for -  minutes to -.    cold pack for - minutes to -.    Patient Education and Home Exercises       Education provided:   - no new home exercise program added today    Written Home Exercises Provided:  fortinoal findings . Exercises were reviewed and Ant was able to demonstrate them prior to the end of the session.  Ant demonstrated good  understanding of the education provided. See EMR under Patient Instructions for exercises provided during therapy sessions    Assessment     Patient fatigued with exercises today. Focused on stretching and hip/core stabilization and strengthening. Therapist added supine hip abduction and marches. Patient required verbal cues for proper body mechiancs    Ant Is progressing well towards his goals.   Pt prognosis is Good.     Pt will continue to benefit from skilled outpatient physical therapy to address the deficits listed in the problem list box on initial evaluation, provide pt/family education and to maximize pt's level of independence in the home and community environment.     Pt's spiritual, cultural and educational needs considered and pt agreeable to plan of care and goals.     Anticipated barriers to physical therapy: none    Goals: Short Term Goals: 6 weeks   Patient will be able to sleep ~ 3 hours without waking from pain  Patient will improve manual muscle test to 5/5  Patient will maintain prolonged positions x 15 minutes with 6 /10 pain  Patient will be able to single leg balance x 5 seconds with no loss of balance for improved balance/proprioception    Long Term Goals: 12 weeks   Patient will be able to sleep through the night without waking from pain  Patient will improve sit to stand test to 20 seconds safely    Patient will maintain prolonged positions x 30 minutes with  4 /10 pain    Plan     Progress toward goals     Leni Cook, VIRGIL

## 2023-09-26 ENCOUNTER — CLINICAL SUPPORT (OUTPATIENT)
Dept: REHABILITATION | Facility: HOSPITAL | Age: 63
End: 2023-09-26
Payer: OTHER GOVERNMENT

## 2023-09-26 DIAGNOSIS — R53.1 DECREASED STRENGTH: ICD-10-CM

## 2023-09-26 DIAGNOSIS — R52 PAIN: Primary | ICD-10-CM

## 2023-09-26 PROCEDURE — 97112 NEUROMUSCULAR REEDUCATION: CPT | Mod: CQ

## 2023-09-26 PROCEDURE — 97110 THERAPEUTIC EXERCISES: CPT | Mod: CQ

## 2023-09-26 NOTE — PROGRESS NOTES
[facility]   Physical Therapy Treatment Note      Name: Ant Webber  Clinic Number: [id number]    Therapy Diagnosis:   Encounter Diagnoses   Name Primary?    Pain Yes    Decreased strength      Physician: Myesha Israel*  Physician Orders: PT Eval and Treat   Medical Diagnosis from Referral: Radiculopathy, lumbosacral region  Evaluation Date: 9/12/2023  Authorization Period Expiration: 1/10/24  Plan of Care Expiration: 12/5/23  Visit # / Visits authorized: 4/ 15      Visit Date: 9/26/2023    PTA Visit #: 2/5     Time In: 11:30 am  Time Out: 12:00 pm  Total Billable Time: 30 minutes    Subjective     Pt reports: Pain is a 3 or 4/10 today.    He was compliant with home exercise program.    Pain: 4/10  Location: bilateral back      Objective      Objective Measures updated at progress report unless specified.     Treatment     Ant received the treatments listed below:      therapeutic exercises to develop strength, endurance, ROM, flexibility, posture, and core stabilization for 10 minutes including:  NuStep 5 minutes  Calf stretch 3 x 30 sec  HS stretch on step 2 x 15 sec  Sitting ball trunk stretch x 5 each way    manual therapy techniques: - were applied to the: - for - minutes, including:  -    neuromuscular re-education activities to improve: Balance, Coordination, Kinesthetic, Sense, Proprioception, and Posture for 20 minutes. The following activities were included:    Cybex back extension 3pl 3 x 10   Cybex hip abduction 2 x 10 2#  Cybex hip extension  2 x 10 2#  Cybex hip flexion 2 x 10 2#    therapeutic activities to improve functional performance for -  minutes, including:  -    gait training to improve functional mobility and safety for -  minutes, including:  -    direct contact modalities after being cleared for contraindications:     supervised modalities after being cleared for contradictions:     hot pack for - minutes to -.    cold pack for - minutes to  -.    Patient Education and Home Exercises       Education provided:   - no new home exercise program added today    Written Home Exercises Provided:  shanthi findings . Exercises were reviewed and Ant was able to demonstrate them prior to the end of the session.  Ant demonstrated good  understanding of the education provided. See EMR under Patient Instructions for exercises provided during therapy sessions    Assessment     Patient fatigued with exercises today. Focused on stretching and hip/core stabilization and strengthening. Therapist added Cybex machines today. Patient required verbal cues for proper body mechiancs    Ant Is progressing well towards his goals.   Pt prognosis is Good.     Pt will continue to benefit from skilled outpatient physical therapy to address the deficits listed in the problem list box on initial evaluation, provide pt/family education and to maximize pt's level of independence in the home and community environment.     Pt's spiritual, cultural and educational needs considered and pt agreeable to plan of care and goals.     Anticipated barriers to physical therapy: none    Goals: Short Term Goals: 6 weeks   Patient will be able to sleep ~ 3 hours without waking from pain  Patient will improve manual muscle test to 5/5  Patient will maintain prolonged positions x 15 minutes with 6 /10 pain  Patient will be able to single leg balance x 5 seconds with no loss of balance for improved balance/proprioception    Long Term Goals: 12 weeks   Patient will be able to sleep through the night without waking from pain  Patient will improve sit to stand test to 20 seconds safely    Patient will maintain prolonged positions x 30 minutes with  4 /10 pain    Plan     Progress toward goals     Leni Cook PTA

## 2023-09-27 DIAGNOSIS — M54.6 THORACIC SPINE PAIN: Chronic | ICD-10-CM

## 2023-09-27 DIAGNOSIS — M54.17 LUMBOSACRAL RADICULOPATHY: Chronic | ICD-10-CM

## 2023-09-27 RX ORDER — PREGABALIN 100 MG/1
100 CAPSULE ORAL 3 TIMES DAILY
Qty: 90 CAPSULE | Refills: 0 | Status: SHIPPED | OUTPATIENT
Start: 2023-09-27 | End: 2023-11-13 | Stop reason: SDUPTHER

## 2023-09-27 NOTE — TELEPHONE ENCOUNTER
----- Message from Alberta Espinoza sent at 9/27/2023  8:39 AM CDT -----  Regarding: rx  Pt is requesting a refill on pregabalin 100 mg  please call pt back at [phone]   KRUNAL MAGALLANES   09/27/2023   1:13 PM   Prescription sent [facility], patient notified.

## 2023-09-29 ENCOUNTER — CLINICAL SUPPORT (OUTPATIENT)
Dept: REHABILITATION | Facility: HOSPITAL | Age: 63
End: 2023-09-29
Payer: OTHER GOVERNMENT

## 2023-09-29 DIAGNOSIS — R52 PAIN: Primary | ICD-10-CM

## 2023-09-29 DIAGNOSIS — R53.1 DECREASED STRENGTH: ICD-10-CM

## 2023-09-29 PROCEDURE — 97110 THERAPEUTIC EXERCISES: CPT | Mod: CQ

## 2023-09-29 PROCEDURE — 97112 NEUROMUSCULAR REEDUCATION: CPT | Mod: CQ

## 2023-09-29 NOTE — PROGRESS NOTES
[facility]   Physical Therapy Treatment Note      Name: Ant Webber  Clinic Number: [id number]    Therapy Diagnosis:   Encounter Diagnoses   Name Primary?    Pain Yes    Decreased strength      Physician: Myesha Israel*  Physician Orders: PT Eval and Treat   Medical Diagnosis from Referral: Radiculopathy, lumbosacral region  Evaluation Date: 9/12/2023  Authorization Period Expiration: 1/10/24  Plan of Care Expiration: 12/5/23  Visit # / Visits authorized: 5/ 15      Visit Date: 9/29/2023    PTA Visit #: 3/5     Time In: 10:00 am  Time Out: 10:38 am  Total Billable Time: 38 minutes    Subjective     Pt reports: Pain is a 4/10 today.    He was compliant with home exercise program.    Pain: 4/10  Location: bilateral back      Objective      Objective Measures updated at progress report unless specified.     Treatment     Ant received the treatments listed below:      therapeutic exercises to develop strength, endurance, ROM, flexibility, posture, and core stabilization for 15minutes including:  NuStep 5 minutes  Calf stretch 3 x 30 sec  HS stretch on step 2 x 15 sec  Sitting ball trunk stretch x 5 each way    manual therapy techniques: - were applied to the: - for - minutes, including:  -    neuromuscular re-education activities to improve: Balance, Coordination, Kinesthetic, Sense, Proprioception, and Posture for 23 minutes. The following activities were included:    Cybex back extension 3pl 3 x 10   Cybex hip abduction 2 x 10 2#  Cybex hip extension  2 x 10 2#  Cybex hip flexion 2 x 10 2#  Bridges on ball 2 x 10 3 second hold   Lower trunk rotation on swiss ball 2 x 10     therapeutic activities to improve functional performance for -  minutes, including:  -    gait training to improve functional mobility and safety for -  minutes, including:  -    direct contact modalities after being cleared for contraindications:     supervised modalities after being cleared for  contradictions:     hot pack for - minutes to -.    cold pack for - minutes to -.    Patient Education and Home Exercises       Education provided:   - no new home exercise program added today    Written Home Exercises Provided:  shanthi findings . Exercises were reviewed and Ant was able to demonstrate them prior to the end of the session.  Ant demonstrated good  understanding of the education provided. See EMR under Patient Instructions for exercises provided during therapy sessions    Assessment     Patient fatigued with exercises today. Focused on stretching and hip/core stabilization and strengthening. Patient required verbal cues for proper body mechiancs    Ant Is progressing well towards his goals.   Pt prognosis is Good.     Pt will continue to benefit from skilled outpatient physical therapy to address the deficits listed in the problem list box on initial evaluation, provide pt/family education and to maximize pt's level of independence in the home and community environment.     Pt's spiritual, cultural and educational needs considered and pt agreeable to plan of care and goals.     Anticipated barriers to physical therapy: none    Goals: Short Term Goals: 6 weeks   Patient will be able to sleep ~ 3 hours without waking from pain  Patient will improve manual muscle test to 5/5  Patient will maintain prolonged positions x 15 minutes with 6 /10 pain  Patient will be able to single leg balance x 5 seconds with no loss of balance for improved balance/proprioception    Long Term Goals: 12 weeks   Patient will be able to sleep through the night without waking from pain  Patient will improve sit to stand test to 20 seconds safely    Patient will maintain prolonged positions x 30 minutes with  4 /10 pain    Plan     Progress toward goals     Leni Cook, VIRGIL

## 2023-10-03 ENCOUNTER — OFFICE VISIT (OUTPATIENT)
Dept: FAMILY MEDICINE | Facility: CLINIC | Age: 63
End: 2023-10-03
Payer: OTHER GOVERNMENT

## 2023-10-03 VITALS
HEIGHT: 70 IN | WEIGHT: 294 LBS | SYSTOLIC BLOOD PRESSURE: 130 MMHG | TEMPERATURE: 97 F | BODY MASS INDEX: 42.09 KG/M2 | RESPIRATION RATE: 18 BRPM | HEART RATE: 83 BPM | OXYGEN SATURATION: 91 % | DIASTOLIC BLOOD PRESSURE: 90 MMHG

## 2023-10-03 DIAGNOSIS — R09.81 NASAL CONGESTION: ICD-10-CM

## 2023-10-03 DIAGNOSIS — J32.9 SINUSITIS, UNSPECIFIED CHRONICITY, UNSPECIFIED LOCATION: Primary | ICD-10-CM

## 2023-10-03 DIAGNOSIS — E11.9 TYPE 2 DIABETES MELLITUS WITHOUT COMPLICATION, WITHOUT LONG-TERM CURRENT USE OF INSULIN: ICD-10-CM

## 2023-10-03 DIAGNOSIS — I10 HYPERTENSION, ESSENTIAL: ICD-10-CM

## 2023-10-03 PROCEDURE — 99214 OFFICE O/P EST MOD 30 MIN: CPT | Mod: 25,,, | Performed by: NURSE PRACTITIONER

## 2023-10-03 PROCEDURE — 99214 PR OFFICE/OUTPT VISIT, EST, LEVL IV, 30-39 MIN: ICD-10-PCS | Mod: 25,,, | Performed by: NURSE PRACTITIONER

## 2023-10-03 PROCEDURE — 96372 PR INJECTION,THERAP/PROPH/DIAG2ST, IM OR SUBCUT: ICD-10-PCS | Mod: ,,, | Performed by: NURSE PRACTITIONER

## 2023-10-03 PROCEDURE — 96372 THER/PROPH/DIAG INJ SC/IM: CPT | Mod: ,,, | Performed by: NURSE PRACTITIONER

## 2023-10-03 RX ORDER — DEXAMETHASONE SODIUM PHOSPHATE 4 MG/ML
6 INJECTION, SOLUTION INTRA-ARTICULAR; INTRALESIONAL; INTRAMUSCULAR; INTRAVENOUS; SOFT TISSUE
Status: COMPLETED | OUTPATIENT
Start: 2023-10-03 | End: 2023-10-03

## 2023-10-03 RX ORDER — CEFDINIR 300 MG/1
300 CAPSULE ORAL 2 TIMES DAILY
Qty: 20 CAPSULE | Refills: 0 | Status: SHIPPED | OUTPATIENT
Start: 2023-10-03 | End: 2023-10-13

## 2023-10-03 RX ADMIN — DEXAMETHASONE SODIUM PHOSPHATE 6 MG: 4 INJECTION, SOLUTION INTRA-ARTICULAR; INTRALESIONAL; INTRAMUSCULAR; INTRAVENOUS; SOFT TISSUE at 11:10

## 2023-10-03 NOTE — PROGRESS NOTES
ALETHEA Noel   [facility]  [address] [facility] [address]  [phone]      PATIENT NAME: Ant Webber  : 1960  DATE: 10/3/23  MRN: [id number]      Billing Provider: ALETHEA Noel  Level of Service: TX OFFICE/OUTPT VISIT, EST, LEVL IV, 30-39 MIN  Patient PCP Information       Provider PCP Type    Bridgett Johnson MD General            Reason for Visit / Chief Complaint: Cough (Sinus pressure and pain, swollen jaw, congestion, no fever X 2 days )       Update PCP  Update Chief Complaint         History of Present Illness / Problem Focused Workflow     Ant Webber presents to the clinic with Cough (Sinus pressure and pain, swollen jaw, congestion, no fever X 2 days )     Cough  This is a new problem. The current episode started in the past 7 days. The problem has been gradually worsening. The problem occurs every few minutes. The cough is Non-productive. Associated symptoms include nasal congestion, postnasal drip, rhinorrhea and a sore throat. Pertinent negatives include no chest pain, ear pain, fever, headaches, rash, shortness of breath or wheezing.       Review of Systems     Review of Systems   Constitutional:  Negative for fever.   HENT:  Positive for nasal congestion, postnasal drip, rhinorrhea and sore throat. Negative for ear pain, nosebleeds, sinus pressure/congestion and sneezing.    Respiratory:  Positive for cough. Negative for chest tightness, shortness of breath and wheezing.    Cardiovascular:  Negative for chest pain.   Gastrointestinal:  Negative for nausea and vomiting.   Musculoskeletal:  Negative for arthralgias and neck stiffness.   Integumentary:  Negative for rash.   Neurological:  Negative for dizziness, vertigo and headaches.        Medical / Social / Family History     Past Medical History:   Diagnosis Date    Diabetes     Diarrhea 8/3/2023    Hypertension     Lumbar post-laminectomy syndrome     Lumbosacral  radiculopathy     Sleep apnea        Past Surgical History:   Procedure Laterality Date    BACK SURGERY  2013    Bilateral L4-L5 TFESI Bilateral 10-, 8-2-2017, 6-    Dr Simms    Bilateral L5-S1 TFESI Bilateral 05/15/2019    Dr Simms    CHOLECYSTOTOMY      HAND SURGERY  2003    HIP ARTHROSCOPY W/ LABRAL DEBRIDEMENT  2020    L5-S1 SUSAN  02/27/2019    Dr Simms    Left L4-L5 TFESI Left 8-, 7-, 6-8-2016    Dr Simms    SELECTIVE INJECTION OF ANESTHETIC AGENT AROUND LUMBAR SPINAL NERVE ROOT BY TRANSFORAMINAL APPROACH Left 9/16/2021    Procedure: Left L5-S1 TFESI;  Surgeon: Nani Simms MD;  Location: [facility];  Service: Pain Management;  Laterality: Left;  NO VAC   WILL BE TESTED    RECENTLY HAD COVID    TONSILLECTOMY      UMBILICAL HERNIA REPAIR         Social History  Mr. Webber  reports that he quit smoking about 24 years ago. His smoking use included cigarettes. He quit smokeless tobacco use about 14 years ago.  His smokeless tobacco use included snuff. He reports current alcohol use. He reports that he does not use drugs.    Family History  Mr. Webber's family history includes Diabetes in his father; Hypertension in his mother; Parkinsonism in his father and mother.    Medications and Allergies     Medications  Outpatient Medications Marked as Taking for the 10/3/23 encounter (Office Visit) with Sharon Roe FNP   Medication Sig Dispense Refill    aspirin (ECOTRIN) 81 MG EC tablet 81 mg.      cetirizine (ZYRTEC) 10 MG tablet Take 10 mg by mouth.      cyanocobalamin (VITAMIN B-12) 1000 MCG tablet 1,000 mcg.      diclofenac sodium (VOLTAREN) 1 % Gel APPLY 2 GRAMS TO AFFECTED AREA(S) 2 TIMES A DAY AS NEEDED FOR PAIN AND INFLAMMATION      metFORMIN (FORTAMET) 1,000 mg 24hr tablet Take 1,000 mg by mouth 2 (two) times daily with meals.      pregabalin (LYRICA) 100 MG capsule Take 1 capsule (100 mg total) by mouth 3 (three) times daily. 90 capsule 0     Current  Facility-Administered Medications for the 10/3/23 encounter (Office Visit) with Sharon Roe FNP   Medication Dose Route Frequency Provider Last Rate Last Admin    [COMPLETED] dexAMETHasone injection 6 mg  6 mg Intramuscular 1 time in Clinic/HOD Sharon Roe FNP   6 mg at 10/03/23 1153       Allergies  Review of patient's allergies indicates:  No Known Allergies    Physical Examination     Vitals:    10/03/23 1153   BP: (!) 130/90   Pulse:    Resp:    Temp:      Physical Exam  Vitals and nursing note reviewed.   Constitutional:       Appearance: Normal appearance.   HENT:      Head: Normocephalic.      Right Ear: Ear canal and external ear normal. There is no impacted cerumen.      Left Ear: Ear canal and external ear normal. There is no impacted cerumen.      Ears:      Comments: Tms dull bilaterally, max sinus tenderness     Nose: No congestion or rhinorrhea.      Mouth/Throat:      Mouth: Mucous membranes are moist.      Pharynx: No oropharyngeal exudate or posterior oropharyngeal erythema.   Eyes:      Conjunctiva/sclera: Conjunctivae normal.      Pupils: Pupils are equal, round, and reactive to light.   Cardiovascular:      Rate and Rhythm: Normal rate and regular rhythm.      Pulses: Normal pulses.      Heart sounds: Normal heart sounds. No murmur heard.  Pulmonary:      Effort: Pulmonary effort is normal. No respiratory distress.      Breath sounds: Normal breath sounds. No wheezing.   Abdominal:      General: Bowel sounds are normal.   Musculoskeletal:      Cervical back: Normal range of motion and neck supple.   Skin:     General: Skin is warm and dry.      Capillary Refill: Capillary refill takes less than 2 seconds.   Neurological:      Mental Status: He is alert and oriented to person, place, and time.      Comments: Ambulates without difficulty   Psychiatric:         Mood and Affect: Mood normal.          Lab Results   Component Value Date    WBC 6.62 08/03/2023    HGB 15.0 08/03/2023    HCT  "47.3 08/03/2023    MCV 92.9 08/03/2023     08/03/2023        Sodium   Date Value Ref Range Status   08/03/2023 140 136 - 145 mmol/L Final     Potassium   Date Value Ref Range Status   08/03/2023 4.5 3.5 - 5.1 mmol/L Final     Chloride   Date Value Ref Range Status   08/03/2023 109 (H) 98 - 107 mmol/L Final     CO2   Date Value Ref Range Status   08/03/2023 27 21 - 32 mmol/L Final     Glucose   Date Value Ref Range Status   08/03/2023 146 (H) 74 - 106 mg/dL Final     BUN   Date Value Ref Range Status   08/03/2023 16 7 - 18 mg/dL Final     Creatinine   Date Value Ref Range Status   08/03/2023 1.05 0.70 - 1.30 mg/dL Final     Calcium   Date Value Ref Range Status   08/03/2023 9.6 8.5 - 10.1 mg/dL Final     Total Protein   Date Value Ref Range Status   08/03/2023 7.5 6.4 - 8.2 g/dL Final     Albumin   Date Value Ref Range Status   08/03/2023 3.5 3.5 - 5.0 g/dL Final     Bilirubin, Total   Date Value Ref Range Status   08/03/2023 0.3 >0.0 - 1.2 mg/dL Final     Alk Phos   Date Value Ref Range Status   08/03/2023 66 45 - 115 U/L Final     AST   Date Value Ref Range Status   08/03/2023 21 15 - 37 U/L Final     ALT   Date Value Ref Range Status   08/03/2023 42 16 - 61 U/L Final     Anion Gap   Date Value Ref Range Status   08/03/2023 9 7 - 16 mmol/L Final     eGFR   Date Value Ref Range Status   08/03/2023 80 >=60 mL/min/1.73m2 Final      No results found for: "LABA1C", "HGBA1C"   No results found for: "CHOL"  No results found for: "HDL"  No results found for: "LDLCALC"  No results found for: "DLDL"  No results found for: "TRIG"  No results found for: "CHOLHDL"   No results found for: "TSH", "A6ABZLE", "K0OIUGR", "THYROIDAB", "FREET4"     Assessment and Plan (including Health Maintenance)      Problem List  Smart Sets  Document Outside HM   :    Plan:     1. Sinusitis, unspecified chronicity, unspecified location  Comments:  maxillary sinus pain with palpation  Orders:  -     dexAMETHasone injection 6 mg  -     " cefdinir (OMNICEF) 300 MG capsule; Take 1 capsule (300 mg total) by mouth 2 (two) times daily. for 10 days  Dispense: 20 capsule; Refill: 0    2. Type 2 diabetes mellitus without complication, without long-term current use of insulin  Comments:  reports well controlled [facility]  Assessment & Plan:  [facility], recent A1c was 6.4.       3. Nasal congestion  Comments:  needs to avoid decongestant--will use short course steroids for nasal congestion  Orders:  -     dexAMETHasone injection 6 mg    4. Hypertension, essential  Comments:  repeat /90--patient to RTC this week for nurse meet for recheck and to also monitor at home.          Patient Instructions   Watch BP at home, return to clinic for nurse meet to have rechecked in 1-2 days. Avoid decongestants, as this may elevate your blood pressure.      Health Maintenance Due   Topic Date Due    Hepatitis C Screening  Never done    Lipid Panel  Never done    COVID-19 Vaccine (1) Never done    HIV Screening  Never done    TETANUS VACCINE  Never done    Colorectal Cancer Screening  Never done    Shingles Vaccine (1 of 2) Never done    Influenza Vaccine (1) 09/01/2023         The patient has no Health Maintenance topics of status Not Due    Future Appointments   Date Time Provider Department Center   10/5/2023 10:00 AM Leni Cook PTA [facility]   10/10/2023 10:00 AM Jaci Ledezma, PT [facility]   10/12/2023 10:00 AM Leni Cook PTA [facility]   12/4/2023  1:45 PM Cally Jorge FNP [facility]            Signature:  ALETHEA Noel  [facility]  [address]  [place] [address]  [phone]    Date of encounter: 10/3/23

## 2023-10-03 NOTE — PATIENT INSTRUCTIONS
Watch BP at home, return to clinic for nurse meet to have rechecked in 1-2 days. Avoid decongestants, as this may elevate your blood pressure.

## 2023-10-04 ENCOUNTER — TELEPHONE (OUTPATIENT)
Dept: FAMILY MEDICINE | Facility: CLINIC | Age: 63
End: 2023-10-04

## 2023-10-05 ENCOUNTER — CLINICAL SUPPORT (OUTPATIENT)
Dept: REHABILITATION | Facility: HOSPITAL | Age: 63
End: 2023-10-05
Payer: OTHER GOVERNMENT

## 2023-10-05 DIAGNOSIS — R52 PAIN: Primary | ICD-10-CM

## 2023-10-05 DIAGNOSIS — R53.1 DECREASED STRENGTH: ICD-10-CM

## 2023-10-05 PROCEDURE — 97110 THERAPEUTIC EXERCISES: CPT | Mod: CQ

## 2023-10-05 PROCEDURE — 97112 NEUROMUSCULAR REEDUCATION: CPT | Mod: CQ

## 2023-10-05 NOTE — PROGRESS NOTES
[facility]   Physical Therapy Treatment Note      Name: Ant Webber  Clinic Number: [id number]    Therapy Diagnosis:   Encounter Diagnoses   Name Primary?    Pain Yes    Decreased strength      Physician: Myesha Israel*  Physician Orders: PT Eval and Treat   Medical Diagnosis from Referral: Radiculopathy, lumbosacral region  Evaluation Date: 9/12/2023  Authorization Period Expiration: 1/10/24  Plan of Care Expiration: 12/5/23  Visit # / Visits authorized: 6/ 15      Visit Date: 10/5/2023    PTA Visit #: 4/5     Time In: 09:55 am  Time Out: 10:33 am  Total Billable Time: 38 minutes    Subjective     Pt reports: Pain is a 4/10 today.    He was compliant with home exercise program.    Pain: 4/10  Location: bilateral back      Objective      Objective Measures updated at progress report unless specified.     Treatment     Ant received the treatments listed below:      therapeutic exercises to develop strength, endurance, ROM, flexibility, posture, and core stabilization for 15minutes including:  NuStep 5 minutes  Calf stretch 3 x 30 sec  HS stretch on step 2 x 15 sec  Sitting ball trunk stretch x 5 each way    manual therapy techniques: - were applied to the: - for - minutes, including:  -    neuromuscular re-education activities to improve: Balance, Coordination, Kinesthetic, Sense, Proprioception, and Posture for 23 minutes. The following activities were included:    Cybex back extension 3pl 3 x 10   Bridges on ball 2 x 10 3 second hold   Pelvic tilt w/ marches 2 x 10 grey band  Lower trunk rotation on swiss ball 2 x 10   Dead bug 2 x 10 yellow ball  Clamshells 2 x 10 grey band      (Not today)  Cybex hip abduction 2 x 10 2#  Cybex hip extension  2 x 10 2#  Cybex hip flexion 2 x 10 2#    therapeutic activities to improve functional performance for -  minutes, including:  -    gait training to improve functional mobility and safety for -  minutes, including:  -    direct  contact modalities after being cleared for contraindications:     supervised modalities after being cleared for contradictions:     hot pack for - minutes to -.    cold pack for - minutes to -.    Patient Education and Home Exercises       Education provided:   - no new home exercise program added today    Written Home Exercises Provided:  eval findings . Exercises were reviewed and Ant was able to demonstrate them prior to the end of the session.  Ant demonstrated good  understanding of the education provided. See EMR under Patient Instructions for exercises provided during therapy sessions    Assessment     Patient fatigued with exercises today. Focused on stretching and hip/core stabilization and strengthening. Patient required verbal cues for proper body mechanics. Added dead bugs today.    Ant Is progressing well towards his goals.   Pt prognosis is Good.     Pt will continue to benefit from skilled outpatient physical therapy to address the deficits listed in the problem list box on initial evaluation, provide pt/family education and to maximize pt's level of independence in the home and community environment.     Pt's spiritual, cultural and educational needs considered and pt agreeable to plan of care and goals.     Anticipated barriers to physical therapy: none    Goals: Short Term Goals: 6 weeks   Patient will be able to sleep ~ 3 hours without waking from pain  Patient will improve manual muscle test to 5/5  Patient will maintain prolonged positions x 15 minutes with 6 /10 pain  Patient will be able to single leg balance x 5 seconds with no loss of balance for improved balance/proprioception    Long Term Goals: 12 weeks   Patient will be able to sleep through the night without waking from pain  Patient will improve sit to stand test to 20 seconds safely    Patient will maintain prolonged positions x 30 minutes with  4 /10 pain    Plan     Progress toward goals     Leni Cook, VIRGIL

## 2023-10-12 ENCOUNTER — CLINICAL SUPPORT (OUTPATIENT)
Dept: REHABILITATION | Facility: HOSPITAL | Age: 63
End: 2023-10-12
Payer: OTHER GOVERNMENT

## 2023-10-12 DIAGNOSIS — R52 PAIN: Primary | ICD-10-CM

## 2023-10-12 DIAGNOSIS — R53.1 DECREASED STRENGTH: ICD-10-CM

## 2023-10-12 PROCEDURE — 97112 NEUROMUSCULAR REEDUCATION: CPT | Mod: CQ

## 2023-10-12 PROCEDURE — 97110 THERAPEUTIC EXERCISES: CPT | Mod: CQ

## 2023-10-12 NOTE — PROGRESS NOTES
[facility]   Physical Therapy Treatment Note      Name: Ant Webber  Clinic Number: [id number]    Therapy Diagnosis:   Encounter Diagnoses   Name Primary?    Pain Yes    Decreased strength      Physician: Myesha Israel*  Physician Orders: PT Eval and Treat   Medical Diagnosis from Referral: Radiculopathy, lumbosacral region  Evaluation Date: 9/12/2023  Authorization Period Expiration: 1/10/24  Plan of Care Expiration: 12/5/23  Visit # / Visits authorized: 7/ 15      Visit Date: 10/12/2023    PTA Visit #: 5/5     Time In: 10:00 am  Time Out: 10:38 am  Total Billable Time: 38 minutes    Subjective     Pt reports: Pain is a 4/10 today.    He was compliant with home exercise program.    Pain: 4/10  Location: bilateral back      Objective      Objective Measures updated at progress report unless specified.     Treatment     Ant received the treatments listed below:      therapeutic exercises to develop strength, endurance, ROM, flexibility, posture, and core stabilization for 15 minutes including:  NuStep 5 minutes  Calf stretch 3 x 30 sec  HS stretch on step 2 x 15 sec  Sitting ball trunk stretch x 5 each way    manual therapy techniques: - were applied to the: - for - minutes, including:  -    neuromuscular re-education activities to improve: Balance, Coordination, Kinesthetic, Sense, Proprioception, and Posture for 23 minutes. The following activities were included:    Cybex back extension 3pl 3 x 10   Dead bug 2 x 10 yellow ball  Clamshells 2 x 10 grey band    Cybex hip abduction 2 x 10 2#  Cybex hip extension  2 x 10 2#  Cybex hip flexion 2 x 10 2#    therapeutic activities to improve functional performance for -  minutes, including:  -    gait training to improve functional mobility and safety for -  minutes, including:  -    direct contact modalities after being cleared for contraindications:     supervised modalities after being cleared for contradictions:     hot pack  for - minutes to -.    cold pack for - minutes to -.    Patient Education and Home Exercises       Education provided:   - no new home exercise program added today    Written Home Exercises Provided:  fortinoal findings . Exercises were reviewed and Ant was able to demonstrate them prior to the end of the session.  Ant demonstrated good  understanding of the education provided. See EMR under Patient Instructions for exercises provided during therapy sessions    Assessment     Patient fatigued with exercises today. Focused on stretching and hip/core stabilization and strengthening. Patient required verbal cues for proper body mechanics. Patient had muscle spasms on right side of stomach and hamstring while trying to get on to bike.      Ant Is progressing well towards his goals.   Pt prognosis is Good.     Pt will continue to benefit from skilled outpatient physical therapy to address the deficits listed in the problem list box on initial evaluation, provide pt/family education and to maximize pt's level of independence in the home and community environment.     Pt's spiritual, cultural and educational needs considered and pt agreeable to plan of care and goals.     Anticipated barriers to physical therapy: none    Goals: Short Term Goals: 6 weeks   Patient will be able to sleep ~ 3 hours without waking from pain  Patient will improve manual muscle test to 5/5  Patient will maintain prolonged positions x 15 minutes with 6 /10 pain  Patient will be able to single leg balance x 5 seconds with no loss of balance for improved balance/proprioception    Long Term Goals: 12 weeks   Patient will be able to sleep through the night without waking from pain  Patient will improve sit to stand test to 20 seconds safely    Patient will maintain prolonged positions x 30 minutes with  4 /10 pain    Plan     Progress toward goals     Leni Cook, VIRGIL

## 2023-10-17 ENCOUNTER — CLINICAL SUPPORT (OUTPATIENT)
Dept: REHABILITATION | Facility: HOSPITAL | Age: 63
End: 2023-10-17
Payer: OTHER GOVERNMENT

## 2023-10-17 DIAGNOSIS — R53.1 DECREASED STRENGTH: ICD-10-CM

## 2023-10-17 DIAGNOSIS — R52 PAIN: Primary | ICD-10-CM

## 2023-10-17 PROCEDURE — 97112 NEUROMUSCULAR REEDUCATION: CPT

## 2023-10-17 PROCEDURE — 97012 MECHANICAL TRACTION THERAPY: CPT

## 2023-10-17 PROCEDURE — 97110 THERAPEUTIC EXERCISES: CPT

## 2023-10-17 NOTE — PROGRESS NOTES
[facility]   Physical Therapy Treatment Note      Name: Ant Webber  Clinic Number: [id number]    Therapy Diagnosis:   Encounter Diagnoses   Name Primary?    Pain Yes    Decreased strength      Physician: Myesha Israel*  Physician Orders: PT Eval and Treat   Medical Diagnosis from Referral: Radiculopathy, lumbosacral region  Evaluation Date: 9/12/2023  Authorization Period Expiration: 1/10/24  Plan of Care Expiration: 12/5/23  Visit # / Visits authorized: 8/ 15      Visit Date: 10/17/2023    PTA Visit #: 5/5     Time In: 915 am  Time Out: 1015 am  Total Billable Time:  60 minutes    Subjective     Pt reports: I went fishing this weekend. I was sore but I enjoyed myself.    He was compliant with home exercise program.    Pain: 4/10  Location: bilateral back      Objective      Objective Measures updated at progress report unless specified.     Treatment     Ant received the treatments listed below:      therapeutic exercises to develop strength, endurance, ROM, flexibility, posture, and core stabilization for 25 minutes including:  NuStep x 5 minutes  Calf stretch 3 x 30 sec bilateral   HS stretch on step 2 x 15 sec bilateral  Sitting ball trunk stretch x 5 each way  Piriformis stretch sitting bilateral  SL trunk rotation stretch bilateral     Mechanical lumbar 90/90 traction x 15 minutes 45/15 rest 100lb pull    neuromuscular re-education activities to improve: Balance, Coordination, Kinesthetic, Sense, Proprioception, and Posture for 20 minutes. The following activities were included:  Cybex back extension 4pl 3 x 10   Cybex hip abduction 2 x 10 2#  Cybex hip extension  2 x 10 2#  Cybex hip flexion 2 x 10 2#            manual therapy techniques: - were applied to the: - for - minutes, including:  -  (Not today)  Dead bug 2 x 10 yellow ball  Clamshells 2 x 10 grey band      therapeutic activities to improve functional performance for -  minutes, including:  -    gait  training to improve functional mobility and safety for -  minutes, including:  -    direct contact modalities after being cleared for contraindications:     supervised modalities after being cleared for contradictions:     hot pack for - minutes to -.    cold pack for - minutes to -.    Patient Education and Home Exercises       Education provided:   - no new home exercise program added today    Written Home Exercises Provided:  fortinoal findings . Exercises were reviewed and Ant was able to demonstrate them prior to the end of the session.  Ant demonstrated good  understanding of the education provided. See EMR under Patient Instructions for exercises provided during therapy sessions    Assessment     Patient fatigued with exercises today. Focused on stretching and hip/core stabilization and strengthening. Patient required verbal cues for proper body mechanics. Finished with mechanical traction for spinal decompression to ease lumbar symptoms. Discussed with patient the traction outcomes and what he may be feeling tonight and tomorrow. Will assess subjective next session to determine if traction will be repeated.     Ant Is progressing well towards his goals.   Pt prognosis is Good.     Pt will continue to benefit from skilled outpatient physical therapy to address the deficits listed in the problem list box on initial evaluation, provide pt/family education and to maximize pt's level of independence in the home and community environment.     Pt's spiritual, cultural and educational needs considered and pt agreeable to plan of care and goals.     Anticipated barriers to physical therapy: none    Goals: Short Term Goals: 6 weeks   Patient will be able to sleep ~ 3 hours without waking from pain  Patient will improve manual muscle test to 5/5  Patient will maintain prolonged positions x 15 minutes with 6 /10 pain  Patient will be able to single leg balance x 5 seconds with no loss of balance for improved  balance/proprioception    Long Term Goals: 12 weeks   Patient will be able to sleep through the night without waking from pain  Patient will improve sit to stand test to 20 seconds safely    Patient will maintain prolonged positions x 30 minutes with  4 /10 pain    Plan     Progress toward goals     TALITA MURILLO, PT

## 2023-10-19 ENCOUNTER — CLINICAL SUPPORT (OUTPATIENT)
Dept: REHABILITATION | Facility: HOSPITAL | Age: 63
End: 2023-10-19
Payer: OTHER GOVERNMENT

## 2023-10-19 DIAGNOSIS — R52 PAIN: Primary | ICD-10-CM

## 2023-10-19 DIAGNOSIS — R53.1 DECREASED STRENGTH: ICD-10-CM

## 2023-10-19 PROCEDURE — 97112 NEUROMUSCULAR REEDUCATION: CPT | Mod: CQ

## 2023-10-19 PROCEDURE — 97012 MECHANICAL TRACTION THERAPY: CPT | Mod: CQ

## 2023-10-19 PROCEDURE — 97110 THERAPEUTIC EXERCISES: CPT | Mod: CQ

## 2023-10-19 NOTE — PROGRESS NOTES
[facility]   Physical Therapy Treatment Note      Name: Ant Webber  Clinic Number: [id number]    Therapy Diagnosis:   Encounter Diagnoses   Name Primary?    Pain Yes    Decreased strength      Physician: Myesha Israel*  Physician Orders: PT Eval and Treat   Medical Diagnosis from Referral: Radiculopathy, lumbosacral region  Evaluation Date: 9/12/2023  Authorization Period Expiration: 1/10/24  Plan of Care Expiration: 12/5/23  Visit # / Visits authorized: 9/15      Visit Date: 10/19/2023    PTA Visit #: 1/5     Time In: 915 am  Time Out: 10:00 am  Total Billable Time:  45 minutes    Subjective     Pt reports: I think the traction did help.    He was compliant with home exercise program.    Pain: 4/10  Location: bilateral back      Objective      Objective Measures updated at progress report unless specified.     Treatment     Ant received the treatments listed below:      therapeutic exercises to develop strength, endurance, ROM, flexibility, posture, and core stabilization for 15 minutes including:  NuStep x 5 minutes  Calf stretch 3 x 30 sec bilateral   HS stretch on step 2 x 15 sec bilateral  Sitting ball trunk stretch x 5 each way  Piriformis stretch sitting bilateral  SL trunk rotation stretch bilateral     Mechanical lumbar 90/90 traction x 15 minutes 45/15 rest 100lb pull    neuromuscular re-education activities to improve: Balance, Coordination, Kinesthetic, Sense, Proprioception, and Posture for 20 minutes. The following activities were included:  Cybex back extension 4pl 3 x 10   Cybex hip abduction 2 x 10 2#  Cybex hip extension  2 x 10 2#  Cybex hip flexion 2 x 10 2#            manual therapy techniques: - were applied to the: - for - minutes, including:  -  (Not today)  Dead bug 2 x 10 yellow ball  Clamshells 2 x 10 grey band      therapeutic activities to improve functional performance for -  minutes, including:  -    gait training to improve functional  mobility and safety for -  minutes, including:  -    direct contact modalities after being cleared for contraindications:     supervised modalities after being cleared for contradictions:     hot pack for - minutes to -.    cold pack for - minutes to -.    Patient Education and Home Exercises       Education provided:   - no new home exercise program added today    Written Home Exercises Provided:  shanthi findings . Exercises were reviewed and Ant was able to demonstrate them prior to the end of the session.  Ant demonstrated good  understanding of the education provided. See EMR under Patient Instructions for exercises provided during therapy sessions    Assessment     Patient fatigued with exercises today. Focused on stretching and hip/core stabilization and strengthening. Patient required verbal cues for proper body mechanics. Finished with mechanical traction for spinal decompression to ease lumbar symptoms. Discussed with patient the traction outcomes and what he may be feeling tonight and tomorrow.     Ant Is progressing well towards his goals.   Pt prognosis is Good.     Pt will continue to benefit from skilled outpatient physical therapy to address the deficits listed in the problem list box on initial evaluation, provide pt/family education and to maximize pt's level of independence in the home and community environment.     Pt's spiritual, cultural and educational needs considered and pt agreeable to plan of care and goals.     Anticipated barriers to physical therapy: none    Goals: Short Term Goals: 6 weeks   Patient will be able to sleep ~ 3 hours without waking from pain  Patient will improve manual muscle test to 5/5  Patient will maintain prolonged positions x 15 minutes with 6 /10 pain  Patient will be able to single leg balance x 5 seconds with no loss of balance for improved balance/proprioception    Long Term Goals: 12 weeks   Patient will be able to sleep through the night without waking from  pain  Patient will improve sit to stand test to 20 seconds safely    Patient will maintain prolonged positions x 30 minutes with  4 /10 pain    Plan     Progress toward goals     Leni Cook, PTA

## 2023-10-23 ENCOUNTER — CLINICAL SUPPORT (OUTPATIENT)
Dept: REHABILITATION | Facility: HOSPITAL | Age: 63
End: 2023-10-23
Payer: OTHER GOVERNMENT

## 2023-10-23 DIAGNOSIS — R52 PAIN: Primary | ICD-10-CM

## 2023-10-23 DIAGNOSIS — R53.1 DECREASED STRENGTH: ICD-10-CM

## 2023-10-23 PROCEDURE — 97112 NEUROMUSCULAR REEDUCATION: CPT

## 2023-10-23 PROCEDURE — 97110 THERAPEUTIC EXERCISES: CPT

## 2023-10-23 NOTE — PROGRESS NOTES
[facility]   Physical Therapy Treatment Note      Name: Ant Webber  Clinic Number: [id number]    Therapy Diagnosis:   Encounter Diagnoses   Name Primary?    Pain Yes    Decreased strength      Physician: Myesha Israel*  Physician Orders: PT Eval and Treat   Medical Diagnosis from Referral: Radiculopathy, lumbosacral region  Evaluation Date: 9/12/2023  Authorization Period Expiration: 1/10/24  Plan of Care Expiration: 12/5/23  Visit # / Visits authorized: 10/15      Visit Date: 10/23/2023    PTA Visit #: 1/5     Time In: 1045 am  Time Out: 1120  am  Total Billable Time:  35 minutes    Subjective     Pt reports: The pain is the same but I am more flexible.    He was compliant with home exercise program.    Pain: 4/10  Location: bilateral back      Objective      Objective Measures updated at progress report unless specified.     Treatment     Ant received the treatments listed below:      therapeutic exercises to develop strength, endurance, ROM, flexibility, posture, and core stabilization for 15 minutes including:  NuStep x 5 minutes  Calf stretch 3 x 30 sec bilateral   HS stretch on step 2 x 15 sec bilateral  Single knee to chest x 2 each knee with passive overpressure added   Sitting ball trunk stretch x 5 each way    (Not today)  Piriformis stretch sitting bilateral  SL trunk rotation stretch bilateral     neuromuscular re-education activities to improve: Balance, Coordination, Kinesthetic, Sense, Proprioception, and Posture for 20 minutes. The following activities were included:  Trunk rotation w/ball x 20  Bridge w/ball x 20  HS curl w/ball  20  Cybex back extension 4pl 3 x 10   Cybex hip abduction 2 x 10 2#  Cybex hip extension  2 x 10 2#  Cybex hip flexion 2 x 10 2#            (Not today)  Mechanical lumbar 90/90 traction x 0 minutes 45/15 rest 100lb pull  manual therapy techniques: - were applied to the: - for - minutes, including:  (Not today)  Dead bug 2 x 10  yellow ball  Clamshells 2 x 10 grey band    therapeutic activities to improve functional performance for -  minutes, including:  gait training to improve functional mobility and safety for -  minutes, including:-  direct contact modalities after being cleared for contraindications:   supervised modalities after being cleared for contradictions:   hot pack for - minutes to -.  cold pack for - minutes to -.      Patient Education and Home Exercises       Education provided:   - no new home exercise program added today    Written Home Exercises Provided:  eval findings . Exercises were reviewed and Ant was able to demonstrate them prior to the end of the session.  Ant demonstrated good  understanding of the education provided. See EMR under Patient Instructions for exercises provided during therapy sessions    Assessment     Patient fatigued with exercises today. Focused on stretching and hip/core stabilization and strengthening. Patient agreeable that traction helped the first time but the benefits from it have been reached. Continued with mat exercises and verbal cues for proper body mechanics.     Ant Is progressing well towards his goals.   Pt prognosis is Good.     Pt will continue to benefit from skilled outpatient physical therapy to address the deficits listed in the problem list box on initial evaluation, provide pt/family education and to maximize pt's level of independence in the home and community environment.     Pt's spiritual, cultural and educational needs considered and pt agreeable to plan of care and goals.     Anticipated barriers to physical therapy: none    Goals: Short Term Goals: 6 weeks   Patient will be able to sleep ~ 3 hours without waking from pain  Patient will improve manual muscle test to 5/5  Patient will maintain prolonged positions x 15 minutes with 6 /10 pain  Patient will be able to single leg balance x 5 seconds with no loss of balance for improved balance/proprioception    Long  Term Goals: 12 weeks   Patient will be able to sleep through the night without waking from pain  Patient will improve sit to stand test to 20 seconds safely    Patient will maintain prolonged positions x 30 minutes with  4 /10 pain    Plan     Progress toward goals     TALITA MURILLO, PT

## 2023-10-26 ENCOUNTER — CLINICAL SUPPORT (OUTPATIENT)
Dept: REHABILITATION | Facility: HOSPITAL | Age: 63
End: 2023-10-26
Payer: OTHER GOVERNMENT

## 2023-10-26 DIAGNOSIS — R53.1 DECREASED STRENGTH: ICD-10-CM

## 2023-10-26 DIAGNOSIS — R52 PAIN: Primary | ICD-10-CM

## 2023-10-26 PROCEDURE — 97110 THERAPEUTIC EXERCISES: CPT

## 2023-10-26 PROCEDURE — 97112 NEUROMUSCULAR REEDUCATION: CPT

## 2023-10-26 NOTE — PROGRESS NOTES
[facility]   Physical Therapy Treatment Note      Name: Ant Webber  Clinic Number: [id number]    Therapy Diagnosis:   Encounter Diagnoses   Name Primary?    Pain Yes    Decreased strength      Physician: Myesha Israel*  Physician Orders: PT Eval and Treat   Medical Diagnosis from Referral: Radiculopathy, lumbosacral region  Evaluation Date: 9/12/2023  Authorization Period Expiration: 1/10/24  Plan of Care Expiration: 12/5/23  Visit # / Visits authorized: 11/15      Visit Date: 10/26/2023    PTA Visit #: 1/5     Time In:  914 am  Time Out:  957  am  Total Billable Time:  38  minutes    Subjective     Pt reports: Had 5 teeth pulled on Tuesday and was running fever. Better today. The legs/back today are achy/throbbing.     He was compliant with home exercise program.    Pain: 3/10  Location: bilateral back      Objective      Objective Measures updated at progress report unless specified.     Treatment     Ant received the treatments listed below:      therapeutic exercises to develop strength, endurance, ROM, flexibility, posture, and core stabilization for 15 minutes including:  NuStep x 5 minutes  Calf stretch 3 x 30 sec bilateral   HS stretch on step 2 x 15 sec bilateral  Single knee to chest x 2 each knee with passive overpressure added   Sitting ball trunk stretch x 5 each way    (Not today)  Piriformis stretch sitting bilateral  SL trunk rotation stretch bilateral     neuromuscular re-education activities to improve: Balance, Coordination, Kinesthetic, Sense, Proprioception, and Posture for 23 minutes. The following activities were included:  Trunk rotation w/ball x 20  Bridge w/ball x 20  HS curl w/ball  20  Cybex back extension 4pl 3 x 10   Cybex hip abduction 2 x 10 2#  Cybex hip extension  2 x 10 2#  Cybex hip flexion 2 x 10 2#            (Not today)  Mechanical lumbar 90/90 traction x 0 minutes 45/15 rest 100lb pull  manual therapy techniques: - were applied to  the: - for - minutes, including:  (Not today)  Dead bug 2 x 10 yellow ball  Clamshells 2 x 10 grey band    therapeutic activities to improve functional performance for -  minutes, including:  gait training to improve functional mobility and safety for -  minutes, including:-  direct contact modalities after being cleared for contraindications:   supervised modalities after being cleared for contradictions:   hot pack for - minutes to -.  cold pack for - minutes to -.      Patient Education and Home Exercises       Education provided:   - added home exercise program 10/26    Written Home Exercises Provided:  yes . Exercises were reviewed and Ant was able to demonstrate them prior to the end of the session.  Ant demonstrated good  understanding of the education provided. See EMR under Patient Instructions for exercises provided during therapy sessions    Assessment     Patient fatigued with exercises today. Focused on stretching and hip/core stabilization and strengthening. Added to home exercise program for carryover and to begin independence of home exercise program for D/C. Patient has a dental procedure next week and has changed his appointments to the end of the week with back to back days. Will review home exercise program on that next session.     Ant Is progressing well towards his goals.   Pt prognosis is Good.     Pt will continue to benefit from skilled outpatient physical therapy to address the deficits listed in the problem list box on initial evaluation, provide pt/family education and to maximize pt's level of independence in the home and community environment.     Pt's spiritual, cultural and educational needs considered and pt agreeable to plan of care and goals.     Anticipated barriers to physical therapy: none    Goals: Short Term Goals: 6 weeks   Patient will be able to sleep ~ 3 hours without waking from pain  Patient will improve manual muscle test to 5/5  Patient will maintain prolonged  positions x 15 minutes with 6 /10 pain  Patient will be able to single leg balance x 5 seconds with no loss of balance for improved balance/proprioception    Long Term Goals: 12 weeks   Patient will be able to sleep through the night without waking from pain  Patient will improve sit to stand test to 20 seconds safely    Patient will maintain prolonged positions x 30 minutes with  4 /10 pain    Plan     Progress toward goals     TALITA MURILLO, PT

## 2023-11-02 ENCOUNTER — CLINICAL SUPPORT (OUTPATIENT)
Dept: REHABILITATION | Facility: HOSPITAL | Age: 63
End: 2023-11-02
Payer: OTHER GOVERNMENT

## 2023-11-02 DIAGNOSIS — R53.1 DECREASED STRENGTH: ICD-10-CM

## 2023-11-02 DIAGNOSIS — R52 PAIN: Primary | ICD-10-CM

## 2023-11-02 PROCEDURE — 97112 NEUROMUSCULAR REEDUCATION: CPT | Mod: CQ

## 2023-11-02 PROCEDURE — 97110 THERAPEUTIC EXERCISES: CPT | Mod: CQ

## 2023-11-02 NOTE — PROGRESS NOTES
[facility]   Physical Therapy Treatment Note      Name: Ant Webber  Clinic Number: [id number]    Therapy Diagnosis:   Encounter Diagnoses   Name Primary?    Pain Yes    Decreased strength      Physician: Myesha Israel*  Physician Orders: PT Eval and Treat   Medical Diagnosis from Referral: Radiculopathy, lumbosacral region  Evaluation Date: 9/12/2023  Authorization Period Expiration: 1/10/24  Plan of Care Expiration: 12/5/23  Visit # / Visits authorized: 12/15      Visit Date: 11/2/2023    PTA Visit #: 1/5     Time In:  9:15 am  Time Out:  9:53 am  Total Billable Time: 38 minutes    Subjective     Pt reports: His pain is a 4/10 today.     He was compliant with home exercise program.    Pain: 4/10  Location: bilateral back      Objective      Objective Measures updated at progress report unless specified.     Treatment     Ant received the treatments listed below:      therapeutic exercises to develop strength, endurance, ROM, flexibility, posture, and core stabilization for 15 minutes including:  NuStep x 5 minutes  Calf stretch 3 x 30 sec bilateral   HS stretch on step 2 x 15 sec bilateral  Single knee to chest x 2 each knee with passive overpressure added   Sitting ball trunk stretch x 5 each way    (Not today)  Piriformis stretch sitting bilateral  SL trunk rotation stretch bilateral     neuromuscular re-education activities to improve: Balance, Coordination, Kinesthetic, Sense, Proprioception, and Posture for 23 minutes. The following activities were included:  Trunk rotation w/ball x 20  Bridge w/ball x 20  HS curl w/ball  20  Cybex back extension 4pl 3 x 10   Cybex hip abduction 2 x 10 2#  Cybex hip extension  2 x 10 2#  Cybex hip flexion 2 x 10 2#            (Not today)  Mechanical lumbar 90/90 traction x 0 minutes 45/15 rest 100lb pull  manual therapy techniques: - were applied to the: - for - minutes, including:  (Not today)  Dead bug 2 x 10 yellow  ball  Clamshells 2 x 10 grey band    therapeutic activities to improve functional performance for -  minutes, including:  gait training to improve functional mobility and safety for -  minutes, including:-  direct contact modalities after being cleared for contraindications:   supervised modalities after being cleared for contradictions:   hot pack for - minutes to -.  cold pack for - minutes to -.      Patient Education and Home Exercises       Education provided:   - added home exercise program 10/26    Written Home Exercises Provided:  yes . Exercises were reviewed and Ant was able to demonstrate them prior to the end of the session.  Ant demonstrated good  understanding of the education provided. See EMR under Patient Instructions for exercises provided during therapy sessions    Assessment     Patient fatigued with exercises today. Focused on stretching and hip/core stabilization and strengthening. Will review home exercise program on that next session.     Ant Is progressing well towards his goals.   Pt prognosis is Good.     Pt will continue to benefit from skilled outpatient physical therapy to address the deficits listed in the problem list box on initial evaluation, provide pt/family education and to maximize pt's level of independence in the home and community environment.     Pt's spiritual, cultural and educational needs considered and pt agreeable to plan of care and goals.     Anticipated barriers to physical therapy: none    Goals: Short Term Goals: 6 weeks   Patient will be able to sleep ~ 3 hours without waking from pain  Patient will improve manual muscle test to 5/5  Patient will maintain prolonged positions x 15 minutes with 6 /10 pain  Patient will be able to single leg balance x 5 seconds with no loss of balance for improved balance/proprioception    Long Term Goals: 12 weeks   Patient will be able to sleep through the night without waking from pain  Patient will improve sit to stand test  to 20 seconds safely    Patient will maintain prolonged positions x 30 minutes with  4 /10 pain    Plan     Progress toward goals     Leni Cook PTA

## 2023-11-03 ENCOUNTER — CLINICAL SUPPORT (OUTPATIENT)
Dept: REHABILITATION | Facility: HOSPITAL | Age: 63
End: 2023-11-03
Payer: OTHER GOVERNMENT

## 2023-11-03 DIAGNOSIS — R52 PAIN: Primary | ICD-10-CM

## 2023-11-03 DIAGNOSIS — R53.1 DECREASED STRENGTH: ICD-10-CM

## 2023-11-03 PROCEDURE — 97112 NEUROMUSCULAR REEDUCATION: CPT

## 2023-11-03 PROCEDURE — 97110 THERAPEUTIC EXERCISES: CPT

## 2023-11-03 NOTE — PROGRESS NOTES
[facility]   Physical Therapy Treatment Note      Name: Ant Webber  Clinic Number: [id number]    Therapy Diagnosis:   Encounter Diagnoses   Name Primary?    Pain Yes    Decreased strength      Physician: Myesha Israel*  Physician Orders: PT Eval and Treat   Medical Diagnosis from Referral: Radiculopathy, lumbosacral region  Evaluation Date: 9/12/2023  Authorization Period Expiration: 1/10/24  Plan of Care Expiration: 12/5/23  Visit # / Visits authorized: 13/15      Visit Date: 11/3/2023    PTA Visit #: 1/5     Time In:  1000 am  Time Out: 1038 am  Total Billable Time: 38 minutes    Subjective     Pt reports: His pain is a 4/10 today.     He was compliant with home exercise program.    Pain: 4/10  Location: bilateral back      Objective      Objective Measures updated at progress report unless specified.     Treatment     Ant received the treatments listed below:      therapeutic exercises to develop strength, endurance, ROM, flexibility, posture, and core stabilization for 15 minutes including:  NuStep x 5 minutes  Calf stretch 3 x 30 sec bilateral   HS stretch on step 2 x 15 sec bilateral  Single knee to chest x 2 each knee with passive overpressure added   Sitting ball trunk stretch x 5 each way    (Not today)  Piriformis stretch sitting bilateral  SL trunk rotation stretch bilateral     neuromuscular re-education activities to improve: Balance, Coordination, Kinesthetic, Sense, Proprioception, and Posture for 23 minutes. The following activities were included:  Trunk rotation w/ball x 20  Bridge w/ball x 20  HS curl w/ball  20  Cybex back extension 4pl 3 x 10   Cybex hip abduction 2 x 10 2#  Cybex hip extension  2 x 10 2#  Cybex hip flexion 2 x 10 2#            (Not today)  Mechanical lumbar 90/90 traction x 0 minutes 45/15 rest 100lb pull  manual therapy techniques: - were applied to the: - for - minutes, including:  (Not today)  Dead bug 2 x 10 yellow  ball  Clamshells 2 x 10 grey band    therapeutic activities to improve functional performance for -  minutes, including:  gait training to improve functional mobility and safety for -  minutes, including:-  direct contact modalities after being cleared for contraindications:   supervised modalities after being cleared for contradictions:   hot pack for - minutes to -.  cold pack for - minutes to -.      Patient Education and Home Exercises       Education provided:   - added home exercise program 10/26    Written Home Exercises Provided:  yes . Exercises were reviewed and Ant was able to demonstrate them prior to the end of the session.  Ant demonstrated good  understanding of the education provided. See EMR under Patient Instructions for exercises provided during therapy sessions    Assessment     Patient fatigued with exercises today. Focused on stretching and hip/core stabilization and strengthening. Will review home exercise program on that next session.     Ant Is progressing well towards his goals.   Pt prognosis is Good.     Pt will continue to benefit from skilled outpatient physical therapy to address the deficits listed in the problem list box on initial evaluation, provide pt/family education and to maximize pt's level of independence in the home and community environment.     Pt's spiritual, cultural and educational needs considered and pt agreeable to plan of care and goals.     Anticipated barriers to physical therapy: none    Goals: Short Term Goals: 6 weeks   Patient will be able to sleep ~ 3 hours without waking from pain  Patient will improve manual muscle test to 5/5  Patient will maintain prolonged positions x 15 minutes with 6 /10 pain  Patient will be able to single leg balance x 5 seconds with no loss of balance for improved balance/proprioception    Long Term Goals: 12 weeks   Patient will be able to sleep through the night without waking from pain  Patient will improve sit to stand test  to 20 seconds safely    Patient will maintain prolonged positions x 30 minutes with  4 /10 pain    Plan     Progress toward goals     TALITA MURILLO, PT

## 2023-11-07 ENCOUNTER — CLINICAL SUPPORT (OUTPATIENT)
Dept: REHABILITATION | Facility: HOSPITAL | Age: 63
End: 2023-11-07
Payer: OTHER GOVERNMENT

## 2023-11-07 DIAGNOSIS — R53.1 DECREASED STRENGTH: ICD-10-CM

## 2023-11-07 DIAGNOSIS — R52 PAIN: Primary | ICD-10-CM

## 2023-11-07 PROCEDURE — 97110 THERAPEUTIC EXERCISES: CPT

## 2023-11-07 PROCEDURE — 97112 NEUROMUSCULAR REEDUCATION: CPT

## 2023-11-07 NOTE — PROGRESS NOTES
[facility]   Physical Therapy Treatment Note      Name: Ant Webber  Clinic Number: [id number]    Therapy Diagnosis:   Encounter Diagnoses   Name Primary?    Pain Yes    Decreased strength      Physician: Myesha Israel*  Physician Orders: PT Eval and Treat   Medical Diagnosis from Referral: Radiculopathy, lumbosacral region  Evaluation Date: 9/12/2023  Authorization Period Expiration: 1/10/24  Plan of Care Expiration: 12/5/23  Visit # / Visits authorized: 14/15      Visit Date: 11/7/2023    PTA Visit #: 1/5     Time In:  908 am  Time Out: 955 am  Total Billable Time: 47 minutes    Subjective     Pt reports: The pain is going to always be there. Therapy is helping but you can't fix old.      He was compliant with home exercise program.    Pain: 3/10  Location: bilateral back      Objective      Objective Measures updated at progress report unless specified.     Treatment     Ant received the treatments listed below:      therapeutic exercises to develop strength, endurance, ROM, flexibility, posture, and core stabilization for 15 minutes including:  NuStep x 5 minutes  Calf stretch 3 x 30 sec bilateral   HS stretch on step 2 x 15 sec bilateral  Single knee to chest x 2 each knee with passive overpressure added   Sitting ball trunk stretch x 5 each way    (Not today)  Piriformis stretch sitting bilateral  SL trunk rotation stretch bilateral     neuromuscular re-education activities to improve: Balance, Coordination, Kinesthetic, Sense, Proprioception, and Posture for 23 minutes. The following activities were included:  Trunk rotation w/ball x 20  Bridge w/ball x 20  HS curl w/ball  20  Cybex back extension 4pl 3 x 10   Cybex hip abduction 2 x 10 2#  Cybex hip extension  2 x 10 2#  Cybex hip flexion 2 x 10 2#            (Not today)  Mechanical lumbar 90/90 traction x 0 minutes 45/15 rest 100lb pull  manual therapy techniques: - were applied to the: - for - minutes,  including:  (Not today)  Dead bug 2 x 10 yellow ball  Clamshells 2 x 10 grey band    therapeutic activities to improve functional performance for -  minutes, including:  gait training to improve functional mobility and safety for -  minutes, including:-  direct contact modalities after being cleared for contraindications:   supervised modalities after being cleared for contradictions:   hot pack for - minutes to -.  cold pack for - minutes to -.      Patient Education and Home Exercises       Education provided:   - added home exercise program 10/26    Written Home Exercises Provided:  yes . Exercises were reviewed and Ant was able to demonstrate them prior to the end of the session.  Ant demonstrated good  understanding of the education provided. See EMR under Patient Instructions for exercises provided during therapy sessions    Assessment     Patient fatigued with exercises today. Focused on stretching and hip/core stabilization and strengthening. Will review home exercise program on that next session.     Ant Is progressing well towards his goals.   Pt prognosis is Good.     Pt will continue to benefit from skilled outpatient physical therapy to address the deficits listed in the problem list box on initial evaluation, provide pt/family education and to maximize pt's level of independence in the home and community environment.     Pt's spiritual, cultural and educational needs considered and pt agreeable to plan of care and goals.     Anticipated barriers to physical therapy: none    Goals: Short Term Goals: 6 weeks   Patient will be able to sleep ~ 3 hours without waking from pain  Patient will improve manual muscle test to 5/5  Patient will maintain prolonged positions x 15 minutes with 6 /10 pain  Patient will be able to single leg balance x 5 seconds with no loss of balance for improved balance/proprioception    Long Term Goals: 12 weeks   Patient will be able to sleep through the night without waking  from pain  Patient will improve sit to stand test to 20 seconds safely    Patient will maintain prolonged positions x 30 minutes with  4 /10 pain    Plan     Progress toward goals     TALITA MURILLO, PT

## 2023-11-10 ENCOUNTER — CLINICAL SUPPORT (OUTPATIENT)
Dept: REHABILITATION | Facility: HOSPITAL | Age: 63
End: 2023-11-10
Payer: OTHER GOVERNMENT

## 2023-11-10 DIAGNOSIS — R52 PAIN: Primary | ICD-10-CM

## 2023-11-10 DIAGNOSIS — R53.1 DECREASED STRENGTH: ICD-10-CM

## 2023-11-10 PROCEDURE — 97110 THERAPEUTIC EXERCISES: CPT | Mod: CQ

## 2023-11-10 PROCEDURE — 97112 NEUROMUSCULAR REEDUCATION: CPT | Mod: CQ

## 2023-11-10 NOTE — PROGRESS NOTES
[facility]   Physical Therapy Treatment Note / Discharge Summary     Name: Ant Webber  Clinic Number: [id number]    Therapy Diagnosis:   Encounter Diagnoses   Name Primary?    Pain Yes    Decreased strength      Physician: Myesha Israel*  Physician Orders: PT Eval and Treat   Medical Diagnosis from Referral: Radiculopathy, lumbosacral region  Evaluation Date: 9/12/2023  Authorization Period Expiration: 1/10/24  Plan of Care Expiration: 12/5/23  Visit # / Visits authorized: 15/15      Visit Date: 11/10/2023    PTA Visit #: 1/5     Time In:  10:00 am  Time Out: 10:38 am  Total Billable Time: 38 minutes    Subjective     Pt reports: Therapy is helping but you can't fix old.      He was compliant with home exercise program.    Pain: 3/10  Location: bilateral back      Objective      Objective Measures updated at progress report unless specified.     Treatment     Ant received the treatments listed below:      therapeutic exercises to develop strength, endurance, ROM, flexibility, posture, and core stabilization for 15 minutes including:  NuStep x 5 minutes  Calf stretch 3 x 30 sec bilateral   HS stretch on step 2 x 15 sec bilateral  Single knee to chest x 2 each knee with passive overpressure added   Sitting ball trunk stretch x 5 each way    (Not today)  Piriformis stretch sitting bilateral  SL trunk rotation stretch bilateral     neuromuscular re-education activities to improve: Balance, Coordination, Kinesthetic, Sense, Proprioception, and Posture for 23 minutes. The following activities were included:  Trunk rotation w/ball x 20  Bridge w/ball x 20  HS curl w/ball  20  Cybex back extension 4pl 3 x 10   Cybex hip abduction 2 x 10 2#  Cybex hip extension  2 x 10 2#  Cybex hip flexion 2 x 10 2#            (Not today)  Mechanical lumbar 90/90 traction x 0 minutes 45/15 rest 100lb pull  manual therapy techniques: - were applied to the: - for - minutes, including:  (Not  today)  Dead bug 2 x 10 yellow ball  Clamshells 2 x 10 grey band    therapeutic activities to improve functional performance for -  minutes, including:  gait training to improve functional mobility and safety for -  minutes, including:-  direct contact modalities after being cleared for contraindications:   supervised modalities after being cleared for contradictions:   hot pack for - minutes to -.  cold pack for - minutes to -.      Patient Education and Home Exercises       Education provided:   - added home exercise program 10/26    Written Home Exercises Provided:  yes . Exercises were reviewed and Ant was able to demonstrate them prior to the end of the session.  Ant demonstrated good  understanding of the education provided. See EMR under Patient Instructions for exercises provided during therapy sessions    Assessment     Patient fatigued with exercises. Focused on stretching and hip/core stabilization and strengthening. Reviewed home exercise program on that next session. Patient was discharged from Physical therapy today. Patient FOTO score increased by 17 points. Patient goals as follow in goal section of note.    Ant Is progressing well towards his goals.   Pt prognosis is Good.     Pt will continue to benefit from skilled outpatient physical therapy to address the deficits listed in the problem list box on initial evaluation, provide pt/family education and to maximize pt's level of independence in the home and community environment.     Pt's spiritual, cultural and educational needs considered and pt agreeable to plan of care and goals.     Anticipated barriers to physical therapy: none    Goals: Short Term Goals: 6 weeks   Patient will be able to sleep ~ 3 hours without waking from pain Progressing   Patient will improve manual muscle test to 5/5 MET   Patient will maintain prolonged positions x 15 minutes with 6 /10 pain MET   Patient will be able to single leg balance x 5 seconds with no loss of  balance for improved balance/proprioception Not Met     Long Term Goals: 12 weeks   Patient will be able to sleep through the night without waking from pain NOT MET   Patient will improve sit to stand test to 20 seconds safely  MET  Patient will maintain prolonged positions x 30 minutes with  4 /10 pain MET     Plan     Patient is Discharged from Physical Therapy.   Leni Cook, PTA

## 2023-11-13 DIAGNOSIS — M54.17 LUMBOSACRAL RADICULOPATHY: Chronic | ICD-10-CM

## 2023-11-13 DIAGNOSIS — M54.6 THORACIC SPINE PAIN: Chronic | ICD-10-CM

## 2023-11-13 PROBLEM — R52 PAIN: Status: RESOLVED | Noted: 2023-09-12 | Resolved: 2023-11-13

## 2023-11-13 PROBLEM — R53.1 DECREASED STRENGTH: Status: RESOLVED | Noted: 2023-09-12 | Resolved: 2023-11-13

## 2023-11-13 RX ORDER — PREGABALIN 100 MG/1
100 CAPSULE ORAL 3 TIMES DAILY
Qty: 90 CAPSULE | Refills: 0 | Status: SHIPPED | OUTPATIENT
Start: 2023-11-13 | End: 2024-01-18 | Stop reason: SDUPTHER

## 2023-11-13 NOTE — TELEPHONE ENCOUNTER
----- Message from Alberta Espinoza sent at 11/13/2023  8:18 AM CST -----  Regarding: rx  Pt is requesting a refill on Lyrica please call pt back at [phone]    KRUNAL MAGALLANES   11/13/2023   8:43 AM   Will send prescription to [facility]

## 2023-12-04 ENCOUNTER — OFFICE VISIT (OUTPATIENT)
Dept: GASTROENTEROLOGY | Facility: CLINIC | Age: 63
End: 2023-12-04
Payer: OTHER GOVERNMENT

## 2023-12-04 ENCOUNTER — HOSPITAL ENCOUNTER (OUTPATIENT)
Dept: RADIOLOGY | Facility: HOSPITAL | Age: 63
Discharge: HOME | End: 2023-12-04
Attending: NURSE PRACTITIONER
Payer: OTHER GOVERNMENT

## 2023-12-04 VITALS
HEIGHT: 70 IN | BODY MASS INDEX: 43 KG/M2 | SYSTOLIC BLOOD PRESSURE: 138 MMHG | DIASTOLIC BLOOD PRESSURE: 83 MMHG | WEIGHT: 300.38 LBS | HEART RATE: 90 BPM

## 2023-12-04 DIAGNOSIS — R13.10 DYSPHAGIA, UNSPECIFIED TYPE: ICD-10-CM

## 2023-12-04 DIAGNOSIS — R10.9 ABDOMINAL PAIN, UNSPECIFIED ABDOMINAL LOCATION: Primary | ICD-10-CM

## 2023-12-04 DIAGNOSIS — R12 HEARTBURN: ICD-10-CM

## 2023-12-04 DIAGNOSIS — K59.00 CONSTIPATION, UNSPECIFIED CONSTIPATION TYPE: ICD-10-CM

## 2023-12-04 DIAGNOSIS — R10.9 ABDOMINAL PAIN, UNSPECIFIED ABDOMINAL LOCATION: ICD-10-CM

## 2023-12-04 PROCEDURE — 99214 OFFICE O/P EST MOD 30 MIN: CPT | Mod: S$PBB,,, | Performed by: NURSE PRACTITIONER

## 2023-12-04 PROCEDURE — 99214 PR OFFICE/OUTPT VISIT, EST, LEVL IV, 30-39 MIN: ICD-10-PCS | Mod: S$PBB,,, | Performed by: NURSE PRACTITIONER

## 2023-12-04 PROCEDURE — 74018 RADEX ABDOMEN 1 VIEW: CPT | Mod: TC

## 2023-12-04 PROCEDURE — 99214 OFFICE O/P EST MOD 30 MIN: CPT | Mod: PBBFAC | Performed by: NURSE PRACTITIONER

## 2023-12-04 PROCEDURE — 74018 RADEX ABDOMEN 1 VIEW: CPT | Mod: 26,,, | Performed by: RADIOLOGY

## 2023-12-04 PROCEDURE — 74018 XR KUB: ICD-10-PCS | Mod: 26,,, | Performed by: RADIOLOGY

## 2023-12-04 RX ORDER — PANTOPRAZOLE SODIUM 40 MG/1
40 TABLET, DELAYED RELEASE ORAL DAILY
Qty: 30 TABLET | Refills: 2 | Status: SHIPPED | OUTPATIENT
Start: 2023-12-04 | End: 2024-03-04 | Stop reason: SDUPTHER

## 2023-12-04 NOTE — PROGRESS NOTES
Ant Webber is a 63 y.o. male here for No chief complaint on file.        PCP: Bridgett Johnson  Referring Provider: No referring provider defined for this encounter.     HPI:  Presents for follow-up appointment due to constipation and abdominal pain.  Patient did increase MiraLax powder to twice daily.  He continues to have constipation as well as abdominal pain.  Abdominal pain is dull and cramping.  Abdominal pain is generalized and in his upper abdomen.  States that the MiraLax powder has improved his bowel movements.  States that he did have a small bowel movement this a.m..  Stool was negative for blood.  Last colonoscopy was 01/11/2021, sessile serrated adenoma.  He is diabetic.  He is also reporting dysphagia that is intermittent with solid food.  Discussed EGD dilation.  Patient agrees to schedule.  Is not currently taking a PPI.  Recommend Protonix 40 mg daily.  Endorses intermittent heartburn.          ROS:  Review of Systems   Constitutional:  Negative for activity change, appetite change, fatigue, fever and unexpected weight change.   HENT:  Positive for trouble swallowing.    Respiratory:  Positive for shortness of breath.    Cardiovascular:  Negative for chest pain.   Gastrointestinal:  Positive for abdominal pain, constipation and reflux. Negative for abdominal distention, anal bleeding, blood in stool, change in bowel habit, diarrhea, nausea and vomiting.   Musculoskeletal:  Negative for gait problem.   Integumentary:  Negative for color change.   Psychiatric/Behavioral:  Negative for sleep disturbance. The patient is not nervous/anxious.           PMHX:  has a past medical history of Diabetes, Diarrhea (8/3/2023), Hypertension, Lumbar post-laminectomy syndrome, Lumbosacral radiculopathy, and Sleep apnea.    PSHX:  has a past surgical history that includes Hip arthroscopy w/ labral debridement (2020); Back surgery (2013); Hand surgery (2003); Tonsillectomy; Umbilical hernia repair;  Cholecystotomy; L5-S1 SUSAN (02/27/2019); Bilateral L4-L5 TFESI (Bilateral, 10-, 8-2-2017, 6-); Left L4-L5 TFESI (Left, 8-, 7-, 6-8-2016); Bilateral L5-S1 TFESI (Bilateral, 05/15/2019); and Selective injection of anesthetic agent around lumbar spinal nerve root by transforaminal approach (Left, 9/16/2021).    PFHX: family history includes Diabetes in his father; Hypertension in his mother; Parkinsonism in his father and mother.    PSlHX:  reports that he quit smoking about 24 years ago. His smoking use included cigarettes. He quit smokeless tobacco use about 14 years ago.  His smokeless tobacco use included snuff. He reports current alcohol use. He reports that he does not use drugs.        Review of patient's allergies indicates:  No Known Allergies    Medication List with Changes/Refills   New Medications    PANTOPRAZOLE (PROTONIX) 40 MG TABLET    Take 1 tablet (40 mg total) by mouth once daily.   Current Medications    AMLODIPINE (NORVASC) 2.5 MG TABLET    Take 2.5 mg by mouth once daily.    ASPIRIN (ECOTRIN) 81 MG EC TABLET    81 mg.    ATORVASTATIN (LIPITOR) 20 MG TABLET    TAKE ONE-HALF TABLET BY MOUTH DAILY FOR CHOLESTEROL. STOP MEDICATION AND CALL PROVIDER FOR ANY UNEXPLAINED MUSCLE ACHES,PAIN OR WEAKNESS    AZITHROMYCIN (Z-SREE) 250 MG TABLET    Take 1 tablet (250 mg total) by mouth once daily. Take first 2 tablets together, then 1 every day until finished.    CETIRIZINE (ZYRTEC) 10 MG TABLET    Take 10 mg by mouth.    CYANOCOBALAMIN (VITAMIN B-12) 1000 MCG TABLET    1,000 mcg.    DICLOFENAC SODIUM (VOLTAREN) 1 % GEL    APPLY 2 GRAMS TO AFFECTED AREA(S) 2 TIMES A DAY AS NEEDED FOR PAIN AND INFLAMMATION    METFORMIN (FORTAMET) 1,000 MG 24HR TABLET    Take 1,000 mg by mouth 2 (two) times daily with meals.    PREGABALIN (LYRICA) 100 MG CAPSULE    Take 1 capsule (100 mg total) by mouth 3 (three) times daily.    TIZANIDINE 4 MG CAP    Take 4 mg by mouth every 8 (eight) hours as needed  "(Spasm).        Objective Findings:  Vital Signs:  /83   Pulse 90   Ht 5' 10" (1.778 m)   Wt (!) 136.3 kg (300 lb 6.4 oz)   BMI 43.10 kg/m²  Body mass index is 43.1 kg/m².    Physical Exam:  Physical Exam  Vitals and nursing note reviewed.   Constitutional:       General: He is not in acute distress.     Appearance: Normal appearance.   HENT:      Mouth/Throat:      Mouth: Mucous membranes are moist.   Cardiovascular:      Rate and Rhythm: Normal rate.   Pulmonary:      Effort: Pulmonary effort is normal.      Breath sounds: No wheezing, rhonchi or rales.   Abdominal:      General: Abdomen is protuberant. Bowel sounds are normal. There is no distension.      Palpations: Abdomen is soft. There is no mass.      Tenderness: There is no abdominal tenderness. There is no guarding.   Skin:     General: Skin is warm and dry.      Coloration: Skin is not jaundiced or pale.   Neurological:      Mental Status: He is alert and oriented to person, place, and time.   Psychiatric:         Mood and Affect: Mood normal.          Labs:  Lab Results   Component Value Date    WBC 6.62 08/03/2023    HGB 15.0 08/03/2023    HCT 47.3 08/03/2023    MCV 92.9 08/03/2023    RDW 13.6 08/03/2023     08/03/2023    LYMPH 7.3 (L) 08/03/2023    LYMPH 0.48 (L) 08/03/2023    MONO 10.9 (H) 08/03/2023    EOS 0.37 08/03/2023    BASO 0.07 08/03/2023     Lab Results   Component Value Date     08/03/2023    K 4.5 08/03/2023     (H) 08/03/2023    CO2 27 08/03/2023     (H) 08/03/2023    BUN 16 08/03/2023    CREATININE 1.05 08/03/2023    CALCIUM 9.6 08/03/2023    PROT 7.5 08/03/2023    ALBUMIN 3.5 08/03/2023    BILITOT 0.3 08/03/2023    ALKPHOS 66 08/03/2023    AST 21 08/03/2023    ALT 42 08/03/2023         Imaging: No results found.      Assessment:  Ant Webber is a 63 y.o. male here with:  1. Abdominal pain, unspecified abdominal location    2. Constipation, unspecified constipation type    3. Dysphagia, unspecified " type    4. Heartburn          Recommendations:  1. KUB  2. Continue MiraLax powder 17 g in 8 oz of liquid twice daily  3. Schedule CT abdomen and pelvis  4. EGD dilation  5. Patient is currently being evaluated by Cardiology at [facility].  He is wearing a monitor.  Is due to see Cardiology on the 13th.  He will need cardiac clearance.  6. Protonix 40 mg daily    Follow up in about 3 months (around 3/4/2024).      Order summary:  Orders Placed This Encounter    X-Ray KUB    Basic Metabolic Panel    pantoprazole (PROTONIX) 40 MG tablet    EGD w Dilation       Thank you for allowing me to participate in the care of Ant Webber.      Cally Jorge, ALETHEA-C

## 2023-12-05 ENCOUNTER — TELEPHONE (OUTPATIENT)
Dept: GASTROENTEROLOGY | Facility: CLINIC | Age: 63
End: 2023-12-05
Payer: OTHER GOVERNMENT

## 2023-12-05 NOTE — TELEPHONE ENCOUNTER
Dr Santos Results and recommendations called to patient. Verbalized understanding.          ----- Message from ALETHEA Ferrari sent at 12/4/2023  4:13 PM CST -----  He is still constipated despite MiraLax powder twice daily.  Recommend 4 oz of MiraLax with 32 oz of Gatorade.  Continue MiraLax twice a day milk of magnesia every 3rd day

## 2023-12-07 ENCOUNTER — HOSPITAL ENCOUNTER (EMERGENCY)
Facility: HOSPITAL | Age: 63
Discharge: HOME | End: 2023-12-07
Attending: EMERGENCY MEDICINE
Payer: COMMERCIAL

## 2023-12-07 VITALS
SYSTOLIC BLOOD PRESSURE: 141 MMHG | WEIGHT: 295 LBS | HEIGHT: 71 IN | RESPIRATION RATE: 16 BRPM | BODY MASS INDEX: 41.3 KG/M2 | HEART RATE: 89 BPM | OXYGEN SATURATION: 93 % | TEMPERATURE: 99 F | DIASTOLIC BLOOD PRESSURE: 78 MMHG

## 2023-12-07 DIAGNOSIS — S43.005A SHOULDER DISLOCATION, LEFT, INITIAL ENCOUNTER: Primary | ICD-10-CM

## 2023-12-07 PROCEDURE — 23655 PR CLOSED RX SHLDR DISLOC,ANESTHESIA: ICD-10-PCS | Mod: 54,LT,, | Performed by: EMERGENCY MEDICINE

## 2023-12-07 PROCEDURE — 23655 CLTX SHO DSLC W/MNPJ W/ANES: CPT | Mod: 54,LT,, | Performed by: EMERGENCY MEDICINE

## 2023-12-07 PROCEDURE — 99284 EMERGENCY DEPT VISIT MOD MDM: CPT | Mod: 57,,, | Performed by: EMERGENCY MEDICINE

## 2023-12-07 PROCEDURE — 96374 THER/PROPH/DIAG INJ IV PUSH: CPT | Mod: 59

## 2023-12-07 PROCEDURE — 25000003 PHARM REV CODE 250

## 2023-12-07 PROCEDURE — 99284 PR EMERGENCY DEPT VISIT,LEVEL IV: ICD-10-PCS | Mod: 57,,, | Performed by: EMERGENCY MEDICINE

## 2023-12-07 PROCEDURE — 96361 HYDRATE IV INFUSION ADD-ON: CPT | Mod: 59

## 2023-12-07 PROCEDURE — 23650 CLTX SHO DSLC W/MNPJ WO ANES: CPT | Mod: LT

## 2023-12-07 PROCEDURE — 99284 EMERGENCY DEPT VISIT MOD MDM: CPT | Mod: 25

## 2023-12-07 PROCEDURE — 63600175 PHARM REV CODE 636 W HCPCS: Performed by: EMERGENCY MEDICINE

## 2023-12-07 PROCEDURE — 63600175 PHARM REV CODE 636 W HCPCS

## 2023-12-07 PROCEDURE — 96375 TX/PRO/DX INJ NEW DRUG ADDON: CPT

## 2023-12-07 RX ORDER — SODIUM CHLORIDE 9 MG/ML
INJECTION, SOLUTION INTRAVENOUS
Status: COMPLETED
Start: 2023-12-07 | End: 2023-12-07

## 2023-12-07 RX ORDER — PROPOFOL 10 MG/ML
40 VIAL (ML) INTRAVENOUS ONCE
Status: COMPLETED | OUTPATIENT
Start: 2023-12-07 | End: 2023-12-07

## 2023-12-07 RX ORDER — PROPOFOL 10 MG/ML
INJECTION, EMULSION INTRAVENOUS
Status: COMPLETED
Start: 2023-12-07 | End: 2023-12-07

## 2023-12-07 RX ORDER — HYDROMORPHONE HYDROCHLORIDE 2 MG/ML
1 INJECTION, SOLUTION INTRAMUSCULAR; INTRAVENOUS; SUBCUTANEOUS
Status: COMPLETED | OUTPATIENT
Start: 2023-12-07 | End: 2023-12-07

## 2023-12-07 RX ORDER — KETOROLAC TROMETHAMINE 30 MG/ML
30 INJECTION, SOLUTION INTRAMUSCULAR; INTRAVENOUS
Status: DISCONTINUED | OUTPATIENT
Start: 2023-12-07 | End: 2023-12-07

## 2023-12-07 RX ORDER — ORPHENADRINE CITRATE 30 MG/ML
60 INJECTION INTRAMUSCULAR; INTRAVENOUS
Status: DISCONTINUED | OUTPATIENT
Start: 2023-12-07 | End: 2023-12-07

## 2023-12-07 RX ORDER — ONDANSETRON 2 MG/ML
4 INJECTION INTRAMUSCULAR; INTRAVENOUS
Status: COMPLETED | OUTPATIENT
Start: 2023-12-07 | End: 2023-12-07

## 2023-12-07 RX ADMIN — HYDROMORPHONE HYDROCHLORIDE 1 MG: 2 INJECTION, SOLUTION INTRAMUSCULAR; INTRAVENOUS; SUBCUTANEOUS at 07:12

## 2023-12-07 RX ADMIN — ONDANSETRON 4 MG: 2 INJECTION INTRAMUSCULAR; INTRAVENOUS at 07:12

## 2023-12-07 RX ADMIN — Medication 40 MG: at 07:12

## 2023-12-07 RX ADMIN — PROPOFOL 40 MG: 10 INJECTION, EMULSION INTRAVENOUS at 07:12

## 2023-12-07 RX ADMIN — SODIUM CHLORIDE 1000 ML: 9 INJECTION, SOLUTION INTRAVENOUS at 07:12

## 2023-12-07 NOTE — ED PROVIDER NOTES
Encounter Date: 2023       History     Chief Complaint   Patient presents with    Fall     Pt states he fell from a standing position over a chair and falling to the floor - pt denies loc - pt c/o left shoulder pain     62 Y/O MALE WITH LEFT SHOULDER PAIN AS A RESULT OF A FALL SHORTLY PRIOR TO ARRIVAL.  PAIN IS MODERATE TO SEVERE.  PAIN IS WORSE WITH ATTEMPTED MOVEMENT.  HE HAS A PALPABLE DEFORMITY CONSISTENT WITH DISLOCATION AT GLENOHUMERAL JOINT.        Review of patient's allergies indicates:  No Known Allergies  Past Medical History:   Diagnosis Date    Diabetes     Diarrhea 8/3/2023    Hypertension     Lumbar post-laminectomy syndrome     Lumbosacral radiculopathy     Sleep apnea      Past Surgical History:   Procedure Laterality Date    BACK SURGERY  2013    Bilateral L4-L5 TFESI Bilateral 10-, 2017, 2017    Dr Simms    Bilateral L5-S1 TFESI Bilateral 05/15/2019    Dr Simms    CHOLECYSTOTOMY      HAND SURGERY      HIP ARTHROSCOPY W/ LABRAL DEBRIDEMENT      L5-S1 SUSAN  2019    Dr Simms    Left L4-L5 TFESI Left 8-, 2016, 2016    Dr Simms    SELECTIVE INJECTION OF ANESTHETIC AGENT AROUND LUMBAR SPINAL NERVE ROOT BY TRANSFORAMINAL APPROACH Left 2021    Procedure: Left L5-S1 TFESI;  Surgeon: Nani Simms MD;  Location: [facility];  Service: Pain Management;  Laterality: Left;  NO VAC   WILL BE TESTED    RECENTLY HAD COVID    TONSILLECTOMY      UMBILICAL HERNIA REPAIR       Family History   Problem Relation Age of Onset    Parkinsonism Mother     Hypertension Mother     Diabetes Father     Parkinsonism Father      Social History     Tobacco Use    Smoking status: Former     Current packs/day: 0.00     Types: Cigarettes     Quit date:      Years since quittin.9    Smokeless tobacco: Former     Types: Snuff     Quit date:    Substance Use Topics    Alcohol use: Yes     Comment: occasionally    Drug use: Never     Review of Systems    All other systems reviewed and are negative.      Physical Exam     Initial Vitals [12/07/23 0634]   BP Pulse Resp Temp SpO2   (!) 193/112 99 18 99.2 °F (37.3 °C) (!) 94 %      MAP       --         Physical Exam    Nursing note and vitals reviewed.  Constitutional: He appears well-developed and well-nourished. He appears distressed.   HENT:   Head: Normocephalic and atraumatic.   Nose: Nose normal.   Mouth/Throat: Oropharynx is clear and moist.   Eyes: Conjunctivae and EOM are normal. Pupils are equal, round, and reactive to light.   Neck: Neck supple.   Normal range of motion.  Cardiovascular:  Normal rate, regular rhythm and normal heart sounds.           Pulmonary/Chest: Breath sounds normal.   Abdominal: Abdomen is soft. Bowel sounds are normal.   Musculoskeletal:         General: Normal range of motion.      Cervical back: Normal range of motion and neck supple.     Neurological: He is alert and oriented to person, place, and time. He has normal strength. GCS score is 15. GCS eye subscore is 4. GCS verbal subscore is 5. GCS motor subscore is 6.   Skin: Skin is warm and dry. Capillary refill takes less than 2 seconds.   Psychiatric: He has a normal mood and affect.         Medical Screening Exam   See Full Note    ED Course   Orthopedic Injury    Date/Time: 12/7/2023 7:53 AM    Performed by: Joseph Ching MD  Authorized by: Joseph Ching MD    Location procedure was performed:  [facility] EMERGENCY DEPARTMENT  Pre-operative diagnosis:  SHOULDER DISLOCATION  Post-operative diagnosis:  REDUCED SHOULDER DISLOCATION  Consent Done?:  Yes  Universal Protocol:     Verbal consent obtained?: Yes      Written consent obtained?: Yes      Consent given by:  Patient    Patient states understanding of procedure being performed: Yes      Patient's understanding of procedure matches consent: Yes      Procedure consent matches procedure scheduled: Yes      Relevant documents present and verified: Yes      Test results  available and properly labeled: Yes      Site marked: Yes      Imaging studies available: Yes      Patient identity confirmed:  Name and verbally with patient    Time Out: Immediately prior to the procedure a time out was called    Injury:     Injury location:  Shoulder    Location details:  Left shoulder    Injury type:  Dislocation    Dislocation type: anterior      Chronicity:  New      Pre-procedure assessment:     Neurovascular status: Neurovascularly intact      Distal perfusion: normal      Neurological function: normal      Range of motion: reduced      Local anesthesia used?: No      Patient sedated?: Yes      ASA Class:  Class 2 - Mild Illness without functional impairment.    Mallampati Score:  Class 2 - Visualization of the soft palate, fauces, and uvula.  Date/Time of last solid:  12/6/2023 10:56 PM    Date/Time of last fluid:  12/6/2023 10:56 PM    Patient/Family history of anesthesia or sedation complications: Yes      Sedation type: moderate (conscious) sedation      Sedation:  Propofol    Analgesia:  Hydromorphone    Sedation start:  12/7/2023 7:36 AM    Sedation end:  12/7/2023 7:38 AM      Selections made in this section will also lock the Injury type section above.:     Manipulation performed?: Yes      Reduction method:  Traction and counter traction    Reduction method:  Traction and counter traction    Reduction method:  Traction and counter traction    Reduction method:  Traction and counter traction    Reduction method:  Traction and counter traction    Reduction method:  Traction and counter traction    Reduction successful?: Yes      Confirmation: Reduction confirmed by x-ray      Immobilization:  Sling    Complications: No      Estimated blood loss (mL):  0    Specimens: No      Implants: No    Post-procedure assessment:     Neurovascular status: Neurovascularly intact      Distal perfusion: normal      Neurological function: normal      Range of motion: improved      Patient tolerance:   Patient tolerated the procedure well with no immediate complications    Labs Reviewed - No data to display       Imaging Results              X-Ray Shoulder 2 or More Views Left (Final result)  Result time 12/07/23 07:56:36      Final result by Alan Monet MD (12/07/23 07:56:36)                   Impression:      Interval reduction.      Electronically signed by: Alan Monet  Date:    12/07/2023  Time:    07:56               Narrative:    EXAMINATION:  XR SHOULDER COMPLETE 2 OR MORE VIEWS LEFT    CLINICAL HISTORY:  post reduction;    TECHNIQUE:  Two or three views of the left shoulder were performed.    COMPARISON:  Earlier today    FINDINGS:  There appears to be interval reduction of the left shoulder dislocation.  There is no acute fracture.  The view that is obtained appears to show decrease in the subacromial space.  Degenerative changes of the acromioclavicular joint more so than what was appreciated on the earlier exam likely due to differences in positioning.                                       X-Ray Shoulder 2 or More Views Left (Final result)  Result time 12/07/23 07:48:04      Final result by Alan Monet MD (12/07/23 07:48:04)                   Impression:      Shoulder dislocation      Electronically signed by: Alan Monet  Date:    12/07/2023  Time:    07:48               Narrative:    EXAMINATION:  XR SHOULDER COMPLETE 2 OR MORE VIEWS LEFT    CLINICAL HISTORY:  fall;    TECHNIQUE:  Two or three views of the left shoulder were performed.    COMPARISON:  None    FINDINGS:  There is an anterior inferior shoulder dislocation.  No fracture is detected by this exam.  Degenerative changes that are mild are seen of the acromioclavicular joint.                                       Medications   sodium chloride 0.9% bolus 1,000 mL 1,000 mL (1,000 mLs Intravenous New Bag 12/7/23 0751)   HYDROmorphone (PF) injection 1 mg (1 mg Intravenous Given 12/7/23 0710)   ondansetron injection 4 mg (4 mg Intravenous Given  12/7/23 0709)   propofol (DIPRIVAN) 10 mg/mL IVP (40 mg Intravenous Given 12/7/23 0736)     Medical Decision Making  FALL AND SHOULDER PAIN WITH DEFORMITY    DDX:  FRACTURE VS DISLOCATION VS OTHER    REDUCED IN ED.  FOLLOW UP WITH ORTHO.      Amount and/or Complexity of Data Reviewed  Radiology: ordered. Decision-making details documented in ED Course.    Risk  Prescription drug management.                                      Clinical Impression:   Final diagnoses:  [S43.005A] Shoulder dislocation, left, initial encounter (Primary)               Joseph Ching MD  12/07/23 0884

## 2023-12-07 NOTE — DISCHARGE INSTRUCTIONS
WEAR SHOULDER IMMOBILIZER.  TAKE IBUPROFEN OR TYLENOL FOR PAIN.  FOLLOW UP WITH ORTHOPEDIC SURGERY.  A REFERRAL HAS BEEN PLACED FOR YOU IN THIS REGARD.  RETURN TO THE EMERGENCY DEPARTMENT AS NEEDED.

## 2023-12-08 ENCOUNTER — TELEPHONE (OUTPATIENT)
Dept: EMERGENCY MEDICINE | Facility: HOSPITAL | Age: 63
End: 2023-12-08
Payer: OTHER GOVERNMENT

## 2023-12-13 DIAGNOSIS — S43.005A SHOULDER DISLOCATION, LEFT, INITIAL ENCOUNTER: Primary | ICD-10-CM

## 2023-12-18 ENCOUNTER — OFFICE VISIT (OUTPATIENT)
Dept: ORTHOPEDICS | Facility: CLINIC | Age: 63
End: 2023-12-18
Payer: OTHER GOVERNMENT

## 2023-12-18 ENCOUNTER — HOSPITAL ENCOUNTER (OUTPATIENT)
Dept: RADIOLOGY | Facility: HOSPITAL | Age: 63
Discharge: HOME | End: 2023-12-18
Attending: ORTHOPAEDIC SURGERY
Payer: OTHER GOVERNMENT

## 2023-12-18 VITALS — BODY MASS INDEX: 41.3 KG/M2 | WEIGHT: 295 LBS | HEIGHT: 71 IN

## 2023-12-18 DIAGNOSIS — S43.005A SHOULDER DISLOCATION, LEFT, INITIAL ENCOUNTER: ICD-10-CM

## 2023-12-18 PROCEDURE — 99203 PR OFFICE/OUTPT VISIT, NEW, LEVL III, 30-44 MIN: ICD-10-PCS | Mod: S$PBB,,, | Performed by: ORTHOPAEDIC SURGERY

## 2023-12-18 PROCEDURE — 73030 X-RAY EXAM OF SHOULDER: CPT | Mod: TC,LT

## 2023-12-18 PROCEDURE — 73030 XR SHOULDER COMPLETE 2 OR MORE VIEWS LEFT: ICD-10-PCS | Mod: 26,LT,, | Performed by: ORTHOPAEDIC SURGERY

## 2023-12-18 PROCEDURE — 73030 X-RAY EXAM OF SHOULDER: CPT | Mod: 26,LT,, | Performed by: ORTHOPAEDIC SURGERY

## 2023-12-18 PROCEDURE — 99213 OFFICE O/P EST LOW 20 MIN: CPT | Mod: PBBFAC | Performed by: ORTHOPAEDIC SURGERY

## 2023-12-18 PROCEDURE — 99203 OFFICE O/P NEW LOW 30 MIN: CPT | Mod: S$PBB,,, | Performed by: ORTHOPAEDIC SURGERY

## 2023-12-18 NOTE — PROGRESS NOTES
Clinic Note        CC:   Chief Complaint   Patient presents with    Left Shoulder - Pain        Principal problem: No primary diagnosis found.     REASON FOR VISIT:       HISTORY:  63-year-old male sustained a fall on his left shoulder was 2 weeks ago sustained a dislocation shoulder was reduced in the emergency room      PAST MEDICAL HISTORY:   Past Medical History:   Diagnosis Date    Diabetes     Diarrhea 8/3/2023    Hypertension     Lumbar post-laminectomy syndrome     Lumbosacral radiculopathy     Sleep apnea           PAST SURGICAL HISTORY:   Past Surgical History:   Procedure Laterality Date    BACK SURGERY  2013    Bilateral L4-L5 TFESI Bilateral 10-, 8-2-2017, 6-    Dr Simms    Bilateral L5-S1 TFESI Bilateral 05/15/2019    Dr Simms    CHOLECYSTOTOMY      HAND SURGERY  2003    HIP ARTHROSCOPY W/ LABRAL DEBRIDEMENT  2020    L5-S1 SUSAN  02/27/2019    Dr Simms    Left L4-L5 TFESI Left 8-, 7-, 6-8-2016    Dr Simms    SELECTIVE INJECTION OF ANESTHETIC AGENT AROUND LUMBAR SPINAL NERVE ROOT BY TRANSFORAMINAL APPROACH Left 9/16/2021    Procedure: Left L5-S1 TFESI;  Surgeon: Nani Simms MD;  Location: [facility];  Service: Pain Management;  Laterality: Left;  NO VAC   WILL BE TESTED    RECENTLY HAD COVID    TONSILLECTOMY      UMBILICAL HERNIA REPAIR            ALLERGIES: Review of patient's allergies indicates:  No Known Allergies     MEDICATIONS :    Current Outpatient Medications:     amLODIPine (NORVASC) 2.5 MG tablet, Take 2.5 mg by mouth once daily., Disp: , Rfl:     aspirin (ECOTRIN) 81 MG EC tablet, 81 mg., Disp: , Rfl:     atorvastatin (LIPITOR) 20 MG tablet, TAKE ONE-HALF TABLET BY MOUTH DAILY FOR CHOLESTEROL. STOP MEDICATION AND CALL PROVIDER FOR ANY UNEXPLAINED MUSCLE ACHES,PAIN OR WEAKNESS, Disp: , Rfl:     azithromycin (Z-SREE) 250 MG tablet, Take 1 tablet (250 mg total) by mouth once daily. Take first 2 tablets together, then 1 every day until  finished. (Patient not taking: Reported on 2023), Disp: 6 tablet, Rfl: 0    cetirizine (ZYRTEC) 10 MG tablet, Take 10 mg by mouth., Disp: , Rfl:     cyanocobalamin (VITAMIN B-12) 1000 MCG tablet, 1,000 mcg., Disp: , Rfl:     diclofenac sodium (VOLTAREN) 1 % Gel, APPLY 2 GRAMS TO AFFECTED AREA(S) 2 TIMES A DAY AS NEEDED FOR PAIN AND INFLAMMATION, Disp: , Rfl:     metFORMIN (FORTAMET) 1,000 mg 24hr tablet, Take 1,000 mg by mouth 2 (two) times daily with meals., Disp: , Rfl:     pantoprazole (PROTONIX) 40 MG tablet, Take 1 tablet (40 mg total) by mouth once daily., Disp: 30 tablet, Rfl: 2    pregabalin (LYRICA) 100 MG capsule, Take 1 capsule (100 mg total) by mouth 3 (three) times daily., Disp: 90 capsule, Rfl: 0    tiZANidine 4 mg Cap, Take 4 mg by mouth every 8 (eight) hours as needed (Spasm). (Patient not taking: Reported on 10/3/2023), Disp: 90 capsule, Rfl: 0     SOCIAL HISTORY:   Social History     Socioeconomic History    Marital status:    Occupational History    Occupation: retail sales   Tobacco Use    Smoking status: Former     Current packs/day: 0.00     Types: Cigarettes     Quit date:      Years since quittin.9    Smokeless tobacco: Former     Types: Snuff     Quit date:    Substance and Sexual Activity    Alcohol use: Yes     Comment: occasionally    Drug use: Never          FAMILY HISTORY:   Family History   Problem Relation Age of Onset    Parkinsonism Mother     Hypertension Mother     Diabetes Father     Parkinsonism Father           PHYSICAL EXAM:  In general, this is a well-developed, well-nourished male . The patient is alert, oriented and cooperative.      HEENT:  Normocephalic, atraumatic.  Extraocular movements are intact bilaterally.  The oropharynx is benign.       NECK:  Nontender with good range of motion.      LUNGS:  Clear to auscultation bilaterally.      HEART:  Demonstrates a regular rate and rhythm.  No murmurs appreciated.      ABDOMEN:  Soft, non-tender,  non-distended.        EXTREMITIES:  Placed in a sling he is here today his x-rays show no dislocation or subluxation.  Left upper extremity moves his fingers has motor function distally 5 5 sensation is intact to touch.  He is tender to palpation over his AC joint very tender over his anterior joint line.  He has some pain on apprehension testing.  He has full motion on internal rotation in forward flexion.  I avoided abduction and external rotation in his he has a history of his dislocation with reduction in the ER.        RADIOGRAPHIC FINDINGS:  X-rays show no fracture subluxation today has a history with documented dislocation anterior of the left shoulder      IMPRESSION: Shoulder dislocation, left, initial encounter  -     Ambulatory referral/consult to Orthopedics         PLAN:  Patient was taught Thera-Band exercises avoid the abduction external rotation of the shoulder I will follow back up in 4-6 weeks.  There are no Patient Instructions on file for this visit.      No follow-ups on file.         Bob Freedman      (Subject to voice recognition error, transcription service not allowed)

## 2023-12-18 NOTE — PROGRESS NOTES
Radiology Interpretation        Patient Name: Ant Webber  Date: 12/18/2023  YOB: 1960  MRN# [id number]        ORDERING DIAGNOSIS:    Encounter Diagnosis   Name Primary?    Shoulder dislocation, left, initial encounter       Two views AP lateral left shoulder skeletally mature individual there is normal mineralization no fractures or subluxations no bony lesions some degenerative changes AC joint noted impression mild degenerative changes AC joint left shoulder                 Bob Freedman MD

## 2024-01-10 DIAGNOSIS — G89.4 CHRONIC PAIN SYNDROME: Primary | ICD-10-CM

## 2024-01-18 DIAGNOSIS — M54.17 LUMBOSACRAL RADICULOPATHY: Chronic | ICD-10-CM

## 2024-01-18 DIAGNOSIS — M54.6 THORACIC SPINE PAIN: Chronic | ICD-10-CM

## 2024-01-18 RX ORDER — PREGABALIN 100 MG/1
100 CAPSULE ORAL 3 TIMES DAILY
Qty: 90 CAPSULE | Refills: 0 | Status: SHIPPED | OUTPATIENT
Start: 2024-01-18 | End: 2024-02-27 | Stop reason: SDUPTHER

## 2024-01-18 NOTE — TELEPHONE ENCOUNTER
----- Message from Alberta Espinoza sent at 1/18/2024  9:31 AM CST -----  Regarding: rx  Pt is needs a refill on lyrica please mehdi pt back at [phone]    Will send refill

## 2024-01-29 NOTE — H&P (VIEW-ONLY)
Subjective:         Patient ID: Ant Webber is a 63 y.o. male.    Chief Complaint: Back Pain and Leg Pain        Pain  This is a chronic problem. The current episode started more than 1 year ago. The problem occurs daily. The problem has been unchanged. Associated symptoms include arthralgias. Pertinent negatives include no anorexia, chest pain, chills, coughing, diaphoresis, fever, sore throat, vertigo or vomiting.     Review of Systems   Constitutional:  Negative for activity change, appetite change, chills, diaphoresis, fever and unexpected weight change.   HENT:  Negative for drooling, ear discharge, ear pain, facial swelling, mouth dryness, nosebleeds, sore throat, trouble swallowing, voice change and goiter.    Eyes:  Negative for photophobia, pain, discharge, redness and visual disturbance.   Respiratory:  Negative for apnea, cough, choking, chest tightness, shortness of breath, wheezing and stridor.    Cardiovascular:  Negative for chest pain, palpitations and leg swelling.   Gastrointestinal:  Negative for abdominal distention, anorexia, diarrhea, rectal pain, vomiting and fecal incontinence.   Endocrine: Negative for cold intolerance, heat intolerance, polydipsia, polyphagia and polyuria.   Genitourinary:  Negative for bladder incontinence, dysuria, flank pain and frequency.   Musculoskeletal:  Positive for arthralgias, back pain and leg pain.   Integumentary:  Negative for color change and pallor.   Neurological:  Negative for dizziness, vertigo, seizures, syncope, facial asymmetry, speech difficulty, light-headedness, memory loss and coordination difficulties.   Hematological:  Negative for adenopathy. Does not bruise/bleed easily.   Psychiatric/Behavioral:  Negative for agitation, behavioral problems, confusion, decreased concentration, dysphoric mood, hallucinations and suicidal ideas. The patient is not nervous/anxious and is not hyperactive.            Past Medical History:   Diagnosis Date     "Diabetes     Diarrhea 8/3/2023    Hypertension     Lumbar post-laminectomy syndrome     Lumbosacral radiculopathy     Sleep apnea      Past Surgical History:   Procedure Laterality Date    BACK SURGERY  2013    Bilateral L4-L5 TFESI Bilateral 10-, 2017, 2017    Dr Simms    Bilateral L5-S1 TFESI Bilateral 05/15/2019    Dr Simms    CHOLECYSTOTOMY      HAND SURGERY      HIP ARTHROSCOPY W/ LABRAL DEBRIDEMENT      L5-S1 SUSAN  2019    Dr Simms    Left L4-L5 TFESI Left 8-, 2016, 2016    Dr Simms    SELECTIVE INJECTION OF ANESTHETIC AGENT AROUND LUMBAR SPINAL NERVE ROOT BY TRANSFORAMINAL APPROACH Left 2021    Procedure: Left L5-S1 TFESI;  Surgeon: Nani Simms MD;  Location: [facility];  Service: Pain Management;  Laterality: Left;  NO VAC   WILL BE TESTED    RECENTLY HAD COVID    TONSILLECTOMY      UMBILICAL HERNIA REPAIR       Social History     Socioeconomic History    Marital status:    Occupational History    Occupation: retail sales   Tobacco Use    Smoking status: Former     Current packs/day: 0.00     Types: Cigarettes     Quit date:      Years since quittin.0    Smokeless tobacco: Former     Types: Snuff     Quit date:    Substance and Sexual Activity    Alcohol use: Yes     Comment: occasionally    Drug use: Never     Family History   Problem Relation Age of Onset    Parkinsonism Mother     Hypertension Mother     Diabetes Father     Parkinsonism Father      Review of patient's allergies indicates:  No Known Allergies     Objective:  Vitals:    24 1011 24 1012   BP: (!) 167/100    Pulse: 88    Resp: 18    SpO2: 98%    Weight: (!) 138.3 kg (305 lb)    Height: 5' 11" (1.803 m)    PainSc:   6   6         Physical Exam  Vitals and nursing note reviewed. Exam conducted with a chaperone present.   Constitutional:       General: He is awake. He is not in acute distress.     Appearance: Normal appearance. He is not " ill-appearing or diaphoretic.   HENT:      Head: Normocephalic and atraumatic.      Nose: Nose normal.      Mouth/Throat:      Mouth: Mucous membranes are moist.      Pharynx: Oropharynx is clear.   Eyes:      Conjunctiva/sclera: Conjunctivae normal.      Pupils: Pupils are equal, round, and reactive to light.   Cardiovascular:      Rate and Rhythm: Normal rate.   Pulmonary:      Effort: Pulmonary effort is normal. No respiratory distress.   Abdominal:      Palpations: Abdomen is soft.      Tenderness: There is no guarding.   Musculoskeletal:         General: Normal range of motion.      Cervical back: Normal range of motion and neck supple. No rigidity.      Thoracic back: Tenderness present.      Lumbar back: Tenderness present.   Skin:     General: Skin is warm and dry.      Coloration: Skin is not jaundiced or pale.   Neurological:      General: No focal deficit present.      Mental Status: He is alert and oriented to person, place, and time. Mental status is at baseline.      Cranial Nerves: No cranial nerve deficit (II-XII).   Psychiatric:         Mood and Affect: Mood normal.         Behavior: Behavior normal. Behavior is cooperative.         Thought Content: Thought content normal.           X-Ray Shoulder 2 or More Views Left  See Procedure Notes for results.     IMPRESSION: Please see Ortho procedure notes for report.      This procedure was auto-finalized by: Virtual Radiologist         Lab Visit on 12/04/2023   Component Date Value Ref Range Status    Sodium 12/04/2023 137  136 - 145 mmol/L Final    Potassium 12/04/2023 4.3  3.5 - 5.1 mmol/L Final    Chloride 12/04/2023 101  98 - 107 mmol/L Final    CO2 12/04/2023 31  21 - 32 mmol/L Final    Anion Gap 12/04/2023 9  7 - 16 mmol/L Final    Glucose 12/04/2023 129 (H)  74 - 106 mg/dL Final    BUN 12/04/2023 17  7 - 18 mg/dL Final    Creatinine 12/04/2023 1.02  0.70 - 1.30 mg/dL Final    BUN/Creatinine Ratio 12/04/2023 17  6 - 20 Final    Calcium 12/04/2023  10.0  8.5 - 10.1 mg/dL Final    eGFR 12/04/2023 83  >=60 mL/min/1.73m2 Final   Lab Visit on 08/03/2023   Component Date Value Ref Range Status    Sodium 08/03/2023 140  136 - 145 mmol/L Final    Potassium 08/03/2023 4.5  3.5 - 5.1 mmol/L Final    Chloride 08/03/2023 109 (H)  98 - 107 mmol/L Final    CO2 08/03/2023 27  21 - 32 mmol/L Final    Anion Gap 08/03/2023 9  7 - 16 mmol/L Final    Glucose 08/03/2023 146 (H)  74 - 106 mg/dL Final    BUN 08/03/2023 16  7 - 18 mg/dL Final    Creatinine 08/03/2023 1.05  0.70 - 1.30 mg/dL Final    BUN/Creatinine Ratio 08/03/2023 15  6 - 20 Final    Calcium 08/03/2023 9.6  8.5 - 10.1 mg/dL Final    Total Protein 08/03/2023 7.5  6.4 - 8.2 g/dL Final    Albumin 08/03/2023 3.5  3.5 - 5.0 g/dL Final    Globulin 08/03/2023 4.0  2.0 - 4.0 g/dL Final    A/G Ratio 08/03/2023 0.9   Final    Bilirubin, Total 08/03/2023 0.3  >0.0 - 1.2 mg/dL Final    Alk Phos 08/03/2023 66  45 - 115 U/L Final    ALT 08/03/2023 42  16 - 61 U/L Final    AST 08/03/2023 21  15 - 37 U/L Final    eGFR 08/03/2023 80  >=60 mL/min/1.73m2 Final    WBC 08/03/2023 6.62  4.50 - 11.00 K/uL Final    RBC 08/03/2023 5.09  4.60 - 6.20 M/uL Final    Hemoglobin 08/03/2023 15.0  13.5 - 18.0 g/dL Final    Hematocrit 08/03/2023 47.3  40.0 - 54.0 % Final    MCV 08/03/2023 92.9  80.0 - 96.0 fL Final    MCH 08/03/2023 29.5  27.0 - 31.0 pg Final    MCHC 08/03/2023 31.7 (L)  32.0 - 36.0 g/dL Final    RDW 08/03/2023 13.6  11.5 - 14.5 % Final    Platelet Count 08/03/2023 307  150 - 400 K/uL Final    MPV 08/03/2023 9.6  9.4 - 12.4 fL Final    Neutrophils % 08/03/2023 74.5 (H)  53.0 - 65.0 % Final    Lymphocytes % 08/03/2023 7.3 (L)  27.0 - 41.0 % Final    Monocytes % 08/03/2023 10.9 (H)  2.0 - 6.0 % Final    Eosinophils % 08/03/2023 5.6 (H)  1.0 - 4.0 % Final    Basophils % 08/03/2023 1.1 (H)  0.0 - 1.0 % Final    Immature Granulocytes % 08/03/2023 0.6 (H)  0.0 - 0.4 % Final    nRBC, Auto 08/03/2023 0.0  <=0.0 % Final    Neutrophils, Abs  08/03/2023 4.94  1.80 - 7.70 K/uL Final    Lymphocytes, Absolute 08/03/2023 0.48 (L)  1.00 - 4.80 K/uL Final    Monocytes, Absolute 08/03/2023 0.72  0.00 - 0.80 K/uL Final    Eosinophils, Absolute 08/03/2023 0.37  0.00 - 0.50 K/uL Final    Basophils, Absolute 08/03/2023 0.07  0.00 - 0.20 K/uL Final    Immature Granulocytes, Absolute 08/03/2023 0.04  0.00 - 0.04 K/uL Final    nRBC, Absolute 08/03/2023 0.00  <=0.00 x10e3/uL Final    Diff Type 08/03/2023 Auto   Final   Office Visit on 08/03/2023   Component Date Value Ref Range Status    Occult Blood 08/04/2023 Negative  Negative, Invalid Final         Orders Placed This Encounter   Procedures    Case Request Operating Room: Injection, Steroid, Epidural, L4/5     Order Specific Question:   Medical Necessity:     Answer:   Medically Non-Urgent [100]     Order Specific Question:   CPT Code:     Answer:   AR INJ LUMBAR/SACRAL, W/IMAGING GUIDANCE [69422]     Order Specific Question:   Is an on-site pathologist required for this procedure?     Answer:   N/A         Requested Prescriptions      No prescriptions requested or ordered in this encounter       Assessment:     1. Lumbosacral radiculopathy    2. Thoracic spine pain    3. Chronic pain syndrome           Lumbar myelogram [facility] in 19 2022 degenerative changes L3/4 bulging disc no central canal stenosis noted moderate left and mild right foraminal stenosis  L5/S1 mild right foraminal stenosis    X-ray lumbar spine [facility] February 2022 postoperative changes noted scoliosis noted mild, diffuse demineralization multiple level degenerative changes    Bone density study [facility] May 2022 osteopenia associated with least 2 times increased risk for fracture      Plan:    Not using narcotics from our office    History lumbar surgery 2013 [facility]    Cannot have MRI, has implantable device for sleep apnea inspire  Dr. Ceja [facility]    Spine surgery recommended spinal cord stimulator, surgery last  resort      Complaint of back pain buttock and leg pain numbness and tingling radicular in nature ongoing for more than 3 months requesting lumbar procedure    Requesting Toradol injection    Toradol 60 mg IM, tolerated well    Continue home exercise program as directed    Indications for this procedure for this specific patient include the following     - Injections being provided as part of a comprehensive pain management program.    - Pt has failed 6 weeks of conservative therapy including oral meds, PT and or home exercise program which has been discussed with the patient   - Injection being provided for suspected radicular pain.    - Pain scale of greater than or equal to 3/10 with functional impairment  - No evidence of local or systemic infection, bleeding tendency or unstable medical condition.    - Pain is causing significant functional limitation resulting in diminished quality of life and impaired age appropriate ADL's.   - Repeat injections are done no sooner than 7 days after the previous injection  - Epidural done for suspected radicular pain along dermatome of nerve   - Epidural done to differentiate level of radicular nerve root pain   -procedure done prior to surgical consideration  - Repeat injections done only when pt reports 50% improvement in pain from previous injections    -increased level of function  - patient is aware if they take any NSAIDs/anticoagulant prior to procedure procedure will be postponed, this was listed on the preop instructions and highlighted, list of NSAIDs/anticoagulant reviewed with patient to include methotrexate  - Injection done at L4/5 level(s) which is consistent with patient's dermatomal pain complaint  The planned medically necessary  surgical procedure is performed in a hospital outpatient department and not in an ambulatory surgical center due to:     -there is no geographically assessable ambulatory surgery center that has the  necessary equipment and  fluoroscopy needed for the procedure     -there is no geographically assessable ambulatory surgical center available at which the physician has privileges     -an ASC's  specific  guideline regarding the individuals weight or health conditions that prevent the use of an ASC     -done under fluoro  Monitor anesthesia request is medically indicated for the scheduled nerve block procedure due to:  1- needle phobia and anxiety, placing  the patient at risk during the provided service.  2-patient has an ASA class greater than 3 and requires constant presence of an anesthesiologist during the procedure,   3-patient has severe problems hard to lie still  4-patient suffers from chronic pain and is unable to function due to  diminished ADLs    Schedule lumbar L4/5 SUSAN # 1, lumbar radiculopathy    Dr. Simms

## 2024-01-29 NOTE — PROGRESS NOTES
Subjective:         Patient ID: Ant Webber is a 63 y.o. male.    Chief Complaint: Back Pain and Leg Pain        Pain  This is a chronic problem. The current episode started more than 1 year ago. The problem occurs daily. The problem has been unchanged. Associated symptoms include arthralgias. Pertinent negatives include no anorexia, chest pain, chills, coughing, diaphoresis, fever, sore throat, vertigo or vomiting.     Review of Systems   Constitutional:  Negative for activity change, appetite change, chills, diaphoresis, fever and unexpected weight change.   HENT:  Negative for drooling, ear discharge, ear pain, facial swelling, mouth dryness, nosebleeds, sore throat, trouble swallowing, voice change and goiter.    Eyes:  Negative for photophobia, pain, discharge, redness and visual disturbance.   Respiratory:  Negative for apnea, cough, choking, chest tightness, shortness of breath, wheezing and stridor.    Cardiovascular:  Negative for chest pain, palpitations and leg swelling.   Gastrointestinal:  Negative for abdominal distention, anorexia, diarrhea, rectal pain, vomiting and fecal incontinence.   Endocrine: Negative for cold intolerance, heat intolerance, polydipsia, polyphagia and polyuria.   Genitourinary:  Negative for bladder incontinence, dysuria, flank pain and frequency.   Musculoskeletal:  Positive for arthralgias, back pain and leg pain.   Integumentary:  Negative for color change and pallor.   Neurological:  Negative for dizziness, vertigo, seizures, syncope, facial asymmetry, speech difficulty, light-headedness, memory loss and coordination difficulties.   Hematological:  Negative for adenopathy. Does not bruise/bleed easily.   Psychiatric/Behavioral:  Negative for agitation, behavioral problems, confusion, decreased concentration, dysphoric mood, hallucinations and suicidal ideas. The patient is not nervous/anxious and is not hyperactive.            Past Medical History:   Diagnosis Date     "Diabetes     Diarrhea 8/3/2023    Hypertension     Lumbar post-laminectomy syndrome     Lumbosacral radiculopathy     Sleep apnea      Past Surgical History:   Procedure Laterality Date    BACK SURGERY  2013    Bilateral L4-L5 TFESI Bilateral 10-, 2017, 2017    Dr Simms    Bilateral L5-S1 TFESI Bilateral 05/15/2019    Dr Simms    CHOLECYSTOTOMY      HAND SURGERY      HIP ARTHROSCOPY W/ LABRAL DEBRIDEMENT      L5-S1 SUSAN  2019    Dr Simms    Left L4-L5 TFESI Left 8-, 2016, 2016    Dr Simms    SELECTIVE INJECTION OF ANESTHETIC AGENT AROUND LUMBAR SPINAL NERVE ROOT BY TRANSFORAMINAL APPROACH Left 2021    Procedure: Left L5-S1 TFESI;  Surgeon: Nani Simms MD;  Location: [facility];  Service: Pain Management;  Laterality: Left;  NO VAC   WILL BE TESTED    RECENTLY HAD COVID    TONSILLECTOMY      UMBILICAL HERNIA REPAIR       Social History     Socioeconomic History    Marital status:    Occupational History    Occupation: retail sales   Tobacco Use    Smoking status: Former     Current packs/day: 0.00     Types: Cigarettes     Quit date:      Years since quittin.0    Smokeless tobacco: Former     Types: Snuff     Quit date:    Substance and Sexual Activity    Alcohol use: Yes     Comment: occasionally    Drug use: Never     Family History   Problem Relation Age of Onset    Parkinsonism Mother     Hypertension Mother     Diabetes Father     Parkinsonism Father      Review of patient's allergies indicates:  No Known Allergies     Objective:  Vitals:    24 1011 24 1012   BP: (!) 167/100    Pulse: 88    Resp: 18    SpO2: 98%    Weight: (!) 138.3 kg (305 lb)    Height: 5' 11" (1.803 m)    PainSc:   6   6         Physical Exam  Vitals and nursing note reviewed. Exam conducted with a chaperone present.   Constitutional:       General: He is awake. He is not in acute distress.     Appearance: Normal appearance. He is not " ill-appearing or diaphoretic.   HENT:      Head: Normocephalic and atraumatic.      Nose: Nose normal.      Mouth/Throat:      Mouth: Mucous membranes are moist.      Pharynx: Oropharynx is clear.   Eyes:      Conjunctiva/sclera: Conjunctivae normal.      Pupils: Pupils are equal, round, and reactive to light.   Cardiovascular:      Rate and Rhythm: Normal rate.   Pulmonary:      Effort: Pulmonary effort is normal. No respiratory distress.   Abdominal:      Palpations: Abdomen is soft.      Tenderness: There is no guarding.   Musculoskeletal:         General: Normal range of motion.      Cervical back: Normal range of motion and neck supple. No rigidity.      Thoracic back: Tenderness present.      Lumbar back: Tenderness present.   Skin:     General: Skin is warm and dry.      Coloration: Skin is not jaundiced or pale.   Neurological:      General: No focal deficit present.      Mental Status: He is alert and oriented to person, place, and time. Mental status is at baseline.      Cranial Nerves: No cranial nerve deficit (II-XII).   Psychiatric:         Mood and Affect: Mood normal.         Behavior: Behavior normal. Behavior is cooperative.         Thought Content: Thought content normal.           X-Ray Shoulder 2 or More Views Left  See Procedure Notes for results.     IMPRESSION: Please see Ortho procedure notes for report.      This procedure was auto-finalized by: Virtual Radiologist         Lab Visit on 12/04/2023   Component Date Value Ref Range Status    Sodium 12/04/2023 137  136 - 145 mmol/L Final    Potassium 12/04/2023 4.3  3.5 - 5.1 mmol/L Final    Chloride 12/04/2023 101  98 - 107 mmol/L Final    CO2 12/04/2023 31  21 - 32 mmol/L Final    Anion Gap 12/04/2023 9  7 - 16 mmol/L Final    Glucose 12/04/2023 129 (H)  74 - 106 mg/dL Final    BUN 12/04/2023 17  7 - 18 mg/dL Final    Creatinine 12/04/2023 1.02  0.70 - 1.30 mg/dL Final    BUN/Creatinine Ratio 12/04/2023 17  6 - 20 Final    Calcium 12/04/2023  10.0  8.5 - 10.1 mg/dL Final    eGFR 12/04/2023 83  >=60 mL/min/1.73m2 Final   Lab Visit on 08/03/2023   Component Date Value Ref Range Status    Sodium 08/03/2023 140  136 - 145 mmol/L Final    Potassium 08/03/2023 4.5  3.5 - 5.1 mmol/L Final    Chloride 08/03/2023 109 (H)  98 - 107 mmol/L Final    CO2 08/03/2023 27  21 - 32 mmol/L Final    Anion Gap 08/03/2023 9  7 - 16 mmol/L Final    Glucose 08/03/2023 146 (H)  74 - 106 mg/dL Final    BUN 08/03/2023 16  7 - 18 mg/dL Final    Creatinine 08/03/2023 1.05  0.70 - 1.30 mg/dL Final    BUN/Creatinine Ratio 08/03/2023 15  6 - 20 Final    Calcium 08/03/2023 9.6  8.5 - 10.1 mg/dL Final    Total Protein 08/03/2023 7.5  6.4 - 8.2 g/dL Final    Albumin 08/03/2023 3.5  3.5 - 5.0 g/dL Final    Globulin 08/03/2023 4.0  2.0 - 4.0 g/dL Final    A/G Ratio 08/03/2023 0.9   Final    Bilirubin, Total 08/03/2023 0.3  >0.0 - 1.2 mg/dL Final    Alk Phos 08/03/2023 66  45 - 115 U/L Final    ALT 08/03/2023 42  16 - 61 U/L Final    AST 08/03/2023 21  15 - 37 U/L Final    eGFR 08/03/2023 80  >=60 mL/min/1.73m2 Final    WBC 08/03/2023 6.62  4.50 - 11.00 K/uL Final    RBC 08/03/2023 5.09  4.60 - 6.20 M/uL Final    Hemoglobin 08/03/2023 15.0  13.5 - 18.0 g/dL Final    Hematocrit 08/03/2023 47.3  40.0 - 54.0 % Final    MCV 08/03/2023 92.9  80.0 - 96.0 fL Final    MCH 08/03/2023 29.5  27.0 - 31.0 pg Final    MCHC 08/03/2023 31.7 (L)  32.0 - 36.0 g/dL Final    RDW 08/03/2023 13.6  11.5 - 14.5 % Final    Platelet Count 08/03/2023 307  150 - 400 K/uL Final    MPV 08/03/2023 9.6  9.4 - 12.4 fL Final    Neutrophils % 08/03/2023 74.5 (H)  53.0 - 65.0 % Final    Lymphocytes % 08/03/2023 7.3 (L)  27.0 - 41.0 % Final    Monocytes % 08/03/2023 10.9 (H)  2.0 - 6.0 % Final    Eosinophils % 08/03/2023 5.6 (H)  1.0 - 4.0 % Final    Basophils % 08/03/2023 1.1 (H)  0.0 - 1.0 % Final    Immature Granulocytes % 08/03/2023 0.6 (H)  0.0 - 0.4 % Final    nRBC, Auto 08/03/2023 0.0  <=0.0 % Final    Neutrophils, Abs  08/03/2023 4.94  1.80 - 7.70 K/uL Final    Lymphocytes, Absolute 08/03/2023 0.48 (L)  1.00 - 4.80 K/uL Final    Monocytes, Absolute 08/03/2023 0.72  0.00 - 0.80 K/uL Final    Eosinophils, Absolute 08/03/2023 0.37  0.00 - 0.50 K/uL Final    Basophils, Absolute 08/03/2023 0.07  0.00 - 0.20 K/uL Final    Immature Granulocytes, Absolute 08/03/2023 0.04  0.00 - 0.04 K/uL Final    nRBC, Absolute 08/03/2023 0.00  <=0.00 x10e3/uL Final    Diff Type 08/03/2023 Auto   Final   Office Visit on 08/03/2023   Component Date Value Ref Range Status    Occult Blood 08/04/2023 Negative  Negative, Invalid Final         Orders Placed This Encounter   Procedures    Case Request Operating Room: Injection, Steroid, Epidural, L4/5     Order Specific Question:   Medical Necessity:     Answer:   Medically Non-Urgent [100]     Order Specific Question:   CPT Code:     Answer:   PA INJ LUMBAR/SACRAL, W/IMAGING GUIDANCE [50758]     Order Specific Question:   Is an on-site pathologist required for this procedure?     Answer:   N/A         Requested Prescriptions      No prescriptions requested or ordered in this encounter       Assessment:     1. Lumbosacral radiculopathy    2. Thoracic spine pain    3. Chronic pain syndrome           Lumbar myelogram [facility] in 19 2022 degenerative changes L3/4 bulging disc no central canal stenosis noted moderate left and mild right foraminal stenosis  L5/S1 mild right foraminal stenosis    X-ray lumbar spine [facility] February 2022 postoperative changes noted scoliosis noted mild, diffuse demineralization multiple level degenerative changes    Bone density study [facility] May 2022 osteopenia associated with least 2 times increased risk for fracture      Plan:    Not using narcotics from our office    History lumbar surgery 2013 [facility]    Cannot have MRI, has implantable device for sleep apnea inspire  Dr. Ceja [facility]    Spine surgery recommended spinal cord stimulator, surgery last  resort      Complaint of back pain buttock and leg pain numbness and tingling radicular in nature ongoing for more than 3 months requesting lumbar procedure    Requesting Toradol injection    Toradol 60 mg IM, tolerated well    Continue home exercise program as directed    Indications for this procedure for this specific patient include the following     - Injections being provided as part of a comprehensive pain management program.    - Pt has failed 6 weeks of conservative therapy including oral meds, PT and or home exercise program which has been discussed with the patient   - Injection being provided for suspected radicular pain.    - Pain scale of greater than or equal to 3/10 with functional impairment  - No evidence of local or systemic infection, bleeding tendency or unstable medical condition.    - Pain is causing significant functional limitation resulting in diminished quality of life and impaired age appropriate ADL's.   - Repeat injections are done no sooner than 7 days after the previous injection  - Epidural done for suspected radicular pain along dermatome of nerve   - Epidural done to differentiate level of radicular nerve root pain   -procedure done prior to surgical consideration  - Repeat injections done only when pt reports 50% improvement in pain from previous injections    -increased level of function  - patient is aware if they take any NSAIDs/anticoagulant prior to procedure procedure will be postponed, this was listed on the preop instructions and highlighted, list of NSAIDs/anticoagulant reviewed with patient to include methotrexate  - Injection done at L4/5 level(s) which is consistent with patient's dermatomal pain complaint  The planned medically necessary  surgical procedure is performed in a hospital outpatient department and not in an ambulatory surgical center due to:     -there is no geographically assessable ambulatory surgery center that has the  necessary equipment and  fluoroscopy needed for the procedure     -there is no geographically assessable ambulatory surgical center available at which the physician has privileges     -an ASC's  specific  guideline regarding the individuals weight or health conditions that prevent the use of an ASC     -done under fluoro  Monitor anesthesia request is medically indicated for the scheduled nerve block procedure due to:  1- needle phobia and anxiety, placing  the patient at risk during the provided service.  2-patient has an ASA class greater than 3 and requires constant presence of an anesthesiologist during the procedure,   3-patient has severe problems hard to lie still  4-patient suffers from chronic pain and is unable to function due to  diminished ADLs    Schedule lumbar L4/5 SUSAN # 1, lumbar radiculopathy    Dr. Simms

## 2024-01-31 ENCOUNTER — OFFICE VISIT (OUTPATIENT)
Dept: PAIN MEDICINE | Facility: CLINIC | Age: 64
End: 2024-01-31
Payer: OTHER GOVERNMENT

## 2024-01-31 VITALS
HEIGHT: 71 IN | BODY MASS INDEX: 42.7 KG/M2 | DIASTOLIC BLOOD PRESSURE: 100 MMHG | SYSTOLIC BLOOD PRESSURE: 167 MMHG | RESPIRATION RATE: 18 BRPM | OXYGEN SATURATION: 98 % | WEIGHT: 305 LBS | HEART RATE: 88 BPM

## 2024-01-31 DIAGNOSIS — M54.17 LUMBOSACRAL RADICULOPATHY: Primary | Chronic | ICD-10-CM

## 2024-01-31 DIAGNOSIS — G89.4 CHRONIC PAIN SYNDROME: ICD-10-CM

## 2024-01-31 DIAGNOSIS — M54.6 THORACIC SPINE PAIN: Chronic | ICD-10-CM

## 2024-01-31 PROCEDURE — 99999PBSHW PR PBB SHADOW TECHNICAL ONLY FILED TO HB: Mod: PBBFAC,,,

## 2024-01-31 PROCEDURE — 99215 OFFICE O/P EST HI 40 MIN: CPT | Mod: PBBFAC | Performed by: PHYSICIAN ASSISTANT

## 2024-01-31 PROCEDURE — 96372 THER/PROPH/DIAG INJ SC/IM: CPT | Mod: PBBFAC | Performed by: PHYSICIAN ASSISTANT

## 2024-01-31 PROCEDURE — 99214 OFFICE O/P EST MOD 30 MIN: CPT | Mod: S$PBB,25,, | Performed by: PHYSICIAN ASSISTANT

## 2024-01-31 RX ORDER — KETOROLAC TROMETHAMINE 30 MG/ML
60 INJECTION, SOLUTION INTRAMUSCULAR; INTRAVENOUS
Status: COMPLETED | OUTPATIENT
Start: 2024-01-31 | End: 2024-01-31

## 2024-01-31 RX ADMIN — KETOROLAC TROMETHAMINE 60 MG: 30 INJECTION, SOLUTION INTRAMUSCULAR at 10:01

## 2024-01-31 NOTE — PATIENT INSTRUCTIONS

## 2024-02-13 ENCOUNTER — ANESTHESIA EVENT (OUTPATIENT)
Dept: PAIN MEDICINE | Facility: HOSPITAL | Age: 64
End: 2024-02-13
Payer: OTHER GOVERNMENT

## 2024-02-13 ENCOUNTER — ANESTHESIA (OUTPATIENT)
Dept: PAIN MEDICINE | Facility: HOSPITAL | Age: 64
End: 2024-02-13
Payer: OTHER GOVERNMENT

## 2024-02-13 ENCOUNTER — HOSPITAL ENCOUNTER (OUTPATIENT)
Facility: HOSPITAL | Age: 64
Discharge: HOME | End: 2024-02-13
Attending: PAIN MEDICINE | Admitting: PAIN MEDICINE
Payer: OTHER GOVERNMENT

## 2024-02-13 VITALS
BODY MASS INDEX: 42.42 KG/M2 | HEART RATE: 82 BPM | WEIGHT: 303 LBS | TEMPERATURE: 98 F | SYSTOLIC BLOOD PRESSURE: 125 MMHG | OXYGEN SATURATION: 97 % | HEIGHT: 71 IN | DIASTOLIC BLOOD PRESSURE: 84 MMHG | RESPIRATION RATE: 16 BRPM

## 2024-02-13 DIAGNOSIS — M54.17 LUMBOSACRAL RADICULOPATHY: Primary | Chronic | ICD-10-CM

## 2024-02-13 DIAGNOSIS — M54.16 LUMBAR RADICULOPATHY: ICD-10-CM

## 2024-02-13 LAB — GLUCOSE SERPL-MCNC: 97 MG/DL (ref 70–105)

## 2024-02-13 PROCEDURE — D9220A PRA ANESTHESIA: Mod: 23,,, | Performed by: ANESTHESIOLOGY

## 2024-02-13 PROCEDURE — 62323 NJX INTERLAMINAR LMBR/SAC: CPT | Mod: ,,, | Performed by: PAIN MEDICINE

## 2024-02-13 PROCEDURE — 25000003 PHARM REV CODE 250: Performed by: ANESTHESIOLOGY

## 2024-02-13 PROCEDURE — 37000008 HC ANESTHESIA 1ST 15 MINUTES: Performed by: PAIN MEDICINE

## 2024-02-13 PROCEDURE — 63600175 PHARM REV CODE 636 W HCPCS: Performed by: ANESTHESIOLOGY

## 2024-02-13 PROCEDURE — 82962 GLUCOSE BLOOD TEST: CPT

## 2024-02-13 PROCEDURE — 25000003 PHARM REV CODE 250: Performed by: PAIN MEDICINE

## 2024-02-13 PROCEDURE — 62323 NJX INTERLAMINAR LMBR/SAC: CPT | Performed by: PAIN MEDICINE

## 2024-02-13 PROCEDURE — 37000009 HC ANESTHESIA EA ADD 15 MINS: Performed by: PAIN MEDICINE

## 2024-02-13 PROCEDURE — 25500020 PHARM REV CODE 255: Performed by: PAIN MEDICINE

## 2024-02-13 PROCEDURE — 63600175 PHARM REV CODE 636 W HCPCS: Performed by: PAIN MEDICINE

## 2024-02-13 RX ORDER — TRIAMCINOLONE ACETONIDE 40 MG/ML
INJECTION, SUSPENSION INTRA-ARTICULAR; INTRAMUSCULAR CODE/TRAUMA/SEDATION MEDICATION
Status: DISCONTINUED | OUTPATIENT
Start: 2024-02-13 | End: 2024-02-13 | Stop reason: HOSPADM

## 2024-02-13 RX ORDER — LIDOCAINE HYDROCHLORIDE 20 MG/ML
INJECTION, SOLUTION EPIDURAL; INFILTRATION; INTRACAUDAL; PERINEURAL
Status: DISCONTINUED | OUTPATIENT
Start: 2024-02-13 | End: 2024-02-13

## 2024-02-13 RX ORDER — PROPOFOL 10 MG/ML
VIAL (ML) INTRAVENOUS
Status: DISCONTINUED | OUTPATIENT
Start: 2024-02-13 | End: 2024-02-13

## 2024-02-13 RX ORDER — SODIUM CHLORIDE 9 MG/ML
INJECTION, SOLUTION INTRAVENOUS CONTINUOUS
Status: DISCONTINUED | OUTPATIENT
Start: 2024-02-13 | End: 2024-02-13 | Stop reason: HOSPADM

## 2024-02-13 RX ORDER — IOPAMIDOL 612 MG/ML
INJECTION, SOLUTION INTRATHECAL CODE/TRAUMA/SEDATION MEDICATION
Status: DISCONTINUED | OUTPATIENT
Start: 2024-02-13 | End: 2024-02-13 | Stop reason: HOSPADM

## 2024-02-13 RX ADMIN — LIDOCAINE HYDROCHLORIDE 100 MG: 20 INJECTION, SOLUTION INTRAVENOUS at 02:02

## 2024-02-13 RX ADMIN — SODIUM CHLORIDE: 9 INJECTION, SOLUTION INTRAVENOUS at 02:02

## 2024-02-13 RX ADMIN — PROPOFOL 100 MG: 10 INJECTION, EMULSION INTRAVENOUS at 02:02

## 2024-02-13 RX ADMIN — PROPOFOL 50 MG: 10 INJECTION, EMULSION INTRAVENOUS at 02:02

## 2024-02-13 NOTE — TRANSFER OF CARE
"Anesthesia Transfer of Care Note    Patient: Ant Webber    Procedure(s) Performed: Procedure(s) (LRB):  Injection, Steroid, Epidural, L4/5 (N/A)    Patient location: PACU    Anesthesia Type: general    Transport from OR: Transported from OR on room air with adequate spontaneous ventilation    Post pain: adequate analgesia    Post assessment: no apparent anesthetic complications    Post vital signs: stable    Level of consciousness: awake and responds to stimulation    Nausea/Vomiting: no nausea/vomiting    Complications: none    Transfer of care protocol was followedComments: See nurses notes for sarita/pain score      Last vitals: Visit Vitals  /77   Pulse 93   Temp 36.6 °C (97.8 °F)   Resp 16   Ht 5' 11" (1.803 m)   Wt (!) 137.4 kg (303 lb)   SpO2 (!) 90%   BMI 42.26 kg/m²     "

## 2024-02-13 NOTE — OP NOTE
"Procedure Note    Procedure Date: 2/13/2024    Procedure Performed:  Lumbar interlaminar epidural steroid injection under fluoroscopy at L4-5    Indications: Patient failed conservative therapy.      Pre-op diagnosis: Lumbar Radiculopathy    Post-op diagnosis: same    Physician: Nani Simms MD    Anesthesia: MAC    Medications injected: Kenalog 40mg,  2 mL sterile preservative-free normal saline.    Local anesthetic used: 1% Lidocaine, 5 ml    Estimated Blood Loss: None    Complications:  None    Technique:  The patient was interviewed in the holding area and Risks/Benefits were discussed, including, but not limited to, the possibility of new or different pain, bleeding or infection.   All questions were answered.  The patient understood and accepted risks.  Consent was verified and signed.   A time-out was taken to identify patient and procedure prior to starting the procedure. The patient was placed in the prone position on the fluoroscopy table. The area of the lumbar spine was prepped with Chloraprep and draped in a sterile manner. The L4-5  interspace was identified and marked under AP fluoroscopy. The skin and subcutaneous tissues overlying the targeted interspace were anesthetized with 3-5 mL of 1% lidocaine using a 25G 1.5" needle.  A 20G  3.5" Tuohy epidural needle was directed toward the interspace under fluoroscopic guidance until the ligamentum flavum was engaged. From this point, a loss of resistance technique with a pulsator syringe a was used to identify entrance of the needle into the epidural space. Once loss of resistance was observed 3mL of Isovue contrast solution was injected. An appropriate epidurogram was noted.  A 3mL mixture consisting of saline and 40 mg of kenalog was injected slowly and without resistance.  The needle was  removed and a sterile Band-Aid dressing was applied to the puncture site.  The patient tolerated the procedure well and was transferred to the .AC. in stable " condition.  The patient was monitored after the procedure and was given post-procedure and discharge instructions to follow at home. The patient was discharged in a stable condition and accompanied by an adult .    Epidurogram:5 mL allotment of Isovue M 300 contrast revealed excellent delineation from L3-5. There were no filling defects or obstruction to dye flow noted.  There was no  intravascular or intrathecal spread noted with dye flow.

## 2024-02-13 NOTE — BRIEF OP NOTE
The  Discharge Note  Short Stay    Admit Date: 2/13/2024    Discharge Date: 2/13/2024    Attending Physician: Nani Simms     Discharge Provider: Nani Simms    Diagnosis: Lumbar radiculopathy    Procedure performed: L4-5 epidural steroid injection and epiduralgram    Findings: Procedure tolerated well and without complications. Consistent with diagnosis.    EBL: 0cc    Specimens: None    Discharged Condition: Good    Final Diagnoses: Lumbosacral radiculopathy [M54.17]    Disposition: Home or Self Care    Hospital Course: No complications, uneventful    Outcome of Hospitalization, Treatment, Procedure, or Surgery:  Patient was admitted for outpatient interventional pain management procedure. The patient tolerated the procedure well with no complications.    Follow up/Patient Instructions:  Follow up as scheduled in Pain Management office in 3-4 weeks.  Patient has received instructions and follow up date and time.    Medications:  Continue previous medications

## 2024-02-13 NOTE — ANESTHESIA POSTPROCEDURE EVALUATION
Anesthesia Post Evaluation    Patient: Ant Webber    Procedure(s) Performed: Procedure(s) (LRB):  Injection, Steroid, Epidural, L4/5 (N/A)    Final Anesthesia Type: general      Patient location during evaluation: PACU  Patient participation: Yes- Able to Participate  Level of consciousness: awake and alert, oriented and awake  Post-procedure vital signs: reviewed and stable  Pain management: adequate  Airway patency: patent    PONV status at discharge: No PONV  Anesthetic complications: no      Cardiovascular status: blood pressure returned to baseline, hemodynamically stable and stable  Respiratory status: unassisted and spontaneous ventilation  Hydration status: euvolemic  Follow-up not needed.              Vitals Value Taken Time   /84 02/13/24 1520   Temp 36.7 °C (98 °F) 02/13/24 1454   Pulse 85 02/13/24 1522   Resp 16 02/13/24 1520   SpO2 97 % 02/13/24 1522   Vitals shown include unvalidated device data.      Event Time   Out of Recovery 15:26:00         Pain/Ulises Score: Ulises Score: 10 (2/13/2024  3:20 PM)

## 2024-02-13 NOTE — DISCHARGE SUMMARY
[facility] - Pain Management  Discharge Note  Short Stay    Procedure(s) (LRB):  Injection, Steroid, Epidural, L4/5 (N/A)      OUTCOME: Patient tolerated treatment/procedure well without complication and is now ready for discharge.    DISPOSITION: Home or Self Care    FINAL DIAGNOSIS:  Lumbar radiculopathy    FOLLOWUP: In clinic    DISCHARGE INSTRUCTIONS:  See nurse's notes     TIME SPENT ON DISCHARGE: 5 minutes

## 2024-02-13 NOTE — PLAN OF CARE
REFER TO WRITTEN DOCUMENT AND RECOVERY INFORMATION.    D/CD PATIENT VIAA WHEELCHAIR AT 1531.    INFORMED PATIENT IF UNABLE TO VOID IN 8 HOURS, GO TO ER. NOTIFY MD OF REDNESS OR DRAINAGE FROM INJECTION SITE OR FEVER OVER 3-4 DAY. REST AND DRINK PLENTY OF FLUIDS FOR THE REMAINDER OF THE DAY. NO LIFTING OVER 5 LBS FOR THE REMAINDER OF THE DAY. CONTINUE REGULAR MEDICATIONS AS PRESCRIBED. MAY TAKE PAIN MEDICATION AS PRESCRIBED.     PAIN IMPROVED  100%  Pre op pain 6.  Post op pain 0.

## 2024-02-13 NOTE — TRANSFER OF CARE
"Anesthesia Transfer of Care Note    Patient: Ant Webber    Procedure(s) Performed: Procedure(s) (LRB):  Injection, Steroid, Epidural, L4/5 (N/A)    Patient location: PACU    Anesthesia Type: general    Transport from OR: Transported from OR on room air with adequate spontaneous ventilation    Post pain: adequate analgesia    Post assessment: no apparent anesthetic complications    Post vital signs: stable    Level of consciousness: awake and responds to stimulation    Nausea/Vomiting: no nausea/vomiting    Complications: none    Transfer of care protocol was followedComments: See nurses notes for sarita and pain scores        Last vitals: Visit Vitals  /84   Pulse 82   Temp 36.7 °C (98 °F)   Resp 16   Ht 5' 11" (1.803 m)   Wt (!) 137.4 kg (303 lb)   SpO2 97%   BMI 42.26 kg/m²     "

## 2024-02-13 NOTE — ANESTHESIA PREPROCEDURE EVALUATION
02/13/2024  Ant Webber is a 63 y.o., male.    Anesthesia Evaluation    I have reviewed the Patient Summary Reports.     I have reviewed the Nursing Notes. I have reviewed the NPO Status.   I have reviewed the Medications.     Review of Systems  Anesthesia Hx:  No problems with previous Anesthesia                Social:  Former Smoker, No Alcohol Use       Cardiovascular:     Hypertension                                        Pulmonary:        Sleep Apnea                Neurological:    Neuromuscular Disease,                                   Endocrine:  Diabetes, well controlled         Morbid Obesity / BMI > 40      Physical Exam  General:  Well nourished and Morbid Obesity       Airway/Jaw/Neck:  Airway Findings: Mouth Opening: Normal     Tongue: Normal      General Airway Assessment: Adult      Mallampati: II   TM Distance: Normal, at least 6 cm               Dental:  DENTAL FINDINGS: Normal     Chest/Lungs:  Chest/Lungs Clear      Heart/Vascular:  Heart Findings: Normal           Vascular Findings: Normal           Abdomen:  Abdomen Findings: Normal      Musculoskeletal:  Musculoskeletal Findings: Normal   Skin:  Skin Findings: Normal           Anesthesia Plan  Type of Anesthesia, risks & benefits discussed:  Anesthesia Type:  general    Patient's Preference:   Plan Factors:          Intra-op Monitoring Plan: standard ASA monitors  Intra-op Monitoring Plan Comments:   Post Op Pain Control Plan: multimodal analgesia  Post Op Pain Control Plan Comments:     Induction:   IV  Beta Blocker:  Patient is not currently on a Beta-Blocker (No further documentation required).       Informed Consent: Informed consent signed with the Patient and all parties understand the risks and agree with anesthesia plan.  All questions answered.  Anesthesia consent signed with patient.  ASA Score: 3     Day of Surgery Review  of History & Physical: I have interviewed and examined the patient. I have reviewed the patient's H&P dated:  There are no significant changes.            Ready For Surgery From Anesthesia Perspective.             Physical Exam  General: Well nourished and Morbid Obesity    Airway:  Mallampati: II   Mouth Opening: Normal  TM Distance: Normal, at least 6 cm  Tongue: Normal        Anesthesia Plan  Type of Anesthesia, risks & benefits discussed:    Anesthesia Type: general  Intra-op Monitoring Plan: standard ASA monitors  Post Op Pain Control Plan: multimodal analgesia  Induction:  IV  Informed Consent: Informed consent signed with the Patient and all parties understand the risks and agree with anesthesia plan.  All questions answered.   ASA Score: 3  Day of Surgery Review of History & Physical: I have interviewed and examined the patient. I have reviewed the patient's H&P dated:     Ready For Surgery From Anesthesia Perspective.     .

## 2024-02-22 ENCOUNTER — HOSPITAL ENCOUNTER (OUTPATIENT)
Dept: GASTROENTEROLOGY | Facility: HOSPITAL | Age: 64
Discharge: HOME | End: 2024-02-22
Attending: NURSE PRACTITIONER
Payer: OTHER GOVERNMENT

## 2024-02-22 ENCOUNTER — ANESTHESIA (OUTPATIENT)
Dept: GASTROENTEROLOGY | Facility: HOSPITAL | Age: 64
End: 2024-02-22
Payer: OTHER GOVERNMENT

## 2024-02-22 ENCOUNTER — ANESTHESIA EVENT (OUTPATIENT)
Dept: GASTROENTEROLOGY | Facility: HOSPITAL | Age: 64
End: 2024-02-22
Payer: OTHER GOVERNMENT

## 2024-02-22 VITALS
WEIGHT: 303 LBS | DIASTOLIC BLOOD PRESSURE: 73 MMHG | HEIGHT: 71 IN | OXYGEN SATURATION: 95 % | RESPIRATION RATE: 14 BRPM | HEART RATE: 73 BPM | BODY MASS INDEX: 42.42 KG/M2 | SYSTOLIC BLOOD PRESSURE: 111 MMHG

## 2024-02-22 DIAGNOSIS — R13.19 ESOPHAGEAL DYSPHAGIA: Primary | ICD-10-CM

## 2024-02-22 DIAGNOSIS — R10.9 ABDOMINAL PAIN, UNSPECIFIED ABDOMINAL LOCATION: ICD-10-CM

## 2024-02-22 DIAGNOSIS — K29.00 ACUTE SUPERFICIAL GASTRITIS WITHOUT HEMORRHAGE: ICD-10-CM

## 2024-02-22 DIAGNOSIS — R13.10 DYSPHAGIA, UNSPECIFIED TYPE: ICD-10-CM

## 2024-02-22 LAB — GLUCOSE SERPL-MCNC: 98 MG/DL (ref 70–105)

## 2024-02-22 PROCEDURE — 37000008 HC ANESTHESIA 1ST 15 MINUTES

## 2024-02-22 PROCEDURE — 43450 DILATE ESOPHAGUS 1/MULT PASS: CPT | Mod: 51,,, | Performed by: INTERNAL MEDICINE

## 2024-02-22 PROCEDURE — 88342 IMHCHEM/IMCYTCHM 1ST ANTB: CPT | Mod: 26,,, | Performed by: PATHOLOGY

## 2024-02-22 PROCEDURE — 43239 EGD BIOPSY SINGLE/MULTIPLE: CPT | Mod: ,,, | Performed by: INTERNAL MEDICINE

## 2024-02-22 PROCEDURE — 82962 GLUCOSE BLOOD TEST: CPT

## 2024-02-22 PROCEDURE — 27201423 OPTIME MED/SURG SUP & DEVICES STERILE SUPPLY

## 2024-02-22 PROCEDURE — C1887 CATHETER, GUIDING: HCPCS

## 2024-02-22 PROCEDURE — D9220A PRA ANESTHESIA: Mod: ,,, | Performed by: ANESTHESIOLOGY

## 2024-02-22 PROCEDURE — 88305 TISSUE EXAM BY PATHOLOGIST: CPT | Mod: 26,,, | Performed by: PATHOLOGY

## 2024-02-22 PROCEDURE — 43450 DILATE ESOPHAGUS 1/MULT PASS: CPT | Performed by: INTERNAL MEDICINE

## 2024-02-22 PROCEDURE — 43239 EGD BIOPSY SINGLE/MULTIPLE: CPT | Performed by: INTERNAL MEDICINE

## 2024-02-22 PROCEDURE — 63600175 PHARM REV CODE 636 W HCPCS: Performed by: ANESTHESIOLOGY

## 2024-02-22 PROCEDURE — 88305 TISSUE EXAM BY PATHOLOGIST: CPT | Mod: TC,SUR | Performed by: INTERNAL MEDICINE

## 2024-02-22 RX ORDER — PROPOFOL 10 MG/ML
VIAL (ML) INTRAVENOUS
Status: DISCONTINUED | OUTPATIENT
Start: 2024-02-22 | End: 2024-02-22

## 2024-02-22 RX ORDER — FENTANYL CITRATE 50 UG/ML
INJECTION, SOLUTION INTRAMUSCULAR; INTRAVENOUS
Status: DISCONTINUED | OUTPATIENT
Start: 2024-02-22 | End: 2024-02-22

## 2024-02-22 RX ORDER — SODIUM CHLORIDE 0.9 % (FLUSH) 0.9 %
10 SYRINGE (ML) INJECTION
Status: DISCONTINUED | OUTPATIENT
Start: 2024-02-22 | End: 2024-02-23 | Stop reason: HOSPADM

## 2024-02-22 RX ADMIN — FENTANYL CITRATE 50 MCG: 50 INJECTION INTRAMUSCULAR; INTRAVENOUS at 01:02

## 2024-02-22 RX ADMIN — PROPOFOL 100 MG: 10 INJECTION, EMULSION INTRAVENOUS at 01:02

## 2024-02-22 NOTE — DISCHARGE INSTRUCTIONS
Procedure Date  2/22/24     Impression  Overall Impression:   Performed forceps biopsies in the esophagus and stomach  Dilation in the esophagus  Dilated in the esophagus with Echols dilator  Erythematous mucosa in the fundus of the stomach and antrum; performed cold forceps biopsy  Mucosa of the esophagus was grossly normal. The distal esophagus was biopsied and the esophagus was dilated with a 48F Echols dilator. The stomach has gastritis and biopsies were obtained. The pyloric channel is patent. Mucosa of the duodenum is normal appearing.     Recommendation    Await pathology results      Avoid nsaids; use PPI or H2RA daily,  repeat esophageal dilation prn; schedule colonoscopy for history of ssa polyps.  Discharge: disp; DC to home. Resume diet. No driving x 24 hours. Follow-up with PCP as scheduled.  Dx: gerd, gastritis, esophageal dysphagia, s/p dilation of the esophagus.     Indication  Abdominal pain, unspecified abdominal location, Dysphagia, unspecified type    THE NURSE WILL CALL YOU WITH YOUR BIOPSY RESULTS IN A FEW DAYS.    NO DRIVING, OPERATING EQUIPMENT, OR SIGNING LEGAL DOCUMENTS FOR 24 HOURS.

## 2024-02-22 NOTE — TRANSFER OF CARE
"Anesthesia Transfer of Care Note    Patient: Ant Webber    Procedure(s) Performed: * egd with dilation *    Patient location: GI    Anesthesia Type: general    Transport from OR: Transported from OR on room air with adequate spontaneous ventilation. Continuous ECG monitoring in transport. Continuous SpO2 monitoring in transport    Post pain: adequate analgesia    Post assessment: no apparent anesthetic complications    Post vital signs: stable    Level of consciousness: awake and responds to stimulation    Nausea/Vomiting: no nausea/vomiting    Complications: none    Transfer of care protocol was followedComments: See nurses notes for adrete/pain score      Last vitals: Visit Vitals  /64   Pulse 72   Resp 10   Ht 5' 11" (1.803 m)   Wt (!) 137.4 kg (303 lb)   SpO2 (!) 84%   BMI 42.26 kg/m²     "

## 2024-02-22 NOTE — ANESTHESIA PREPROCEDURE EVALUATION
02/22/2024  Ant Webber is a 63 y.o., male.    Anesthesia Evaluation    I have reviewed the Patient Summary Reports.    I have reviewed the NPO Status.   I have reviewed the Medications.     Review of Systems  Anesthesia Hx:  No problems with previous Anesthesia                Social:  Former Smoker, No Alcohol Use       Cardiovascular:     Hypertension                                        Pulmonary:        Sleep Apnea                Neurological:  TIA,  Neuromuscular Disease,                                   Endocrine:  Diabetes, well controlled, type 2         Morbid Obesity / BMI > 40      Physical Exam  General:  Well nourished and Morbid Obesity       Airway/Jaw/Neck:  Airway Findings: Mouth Opening: Normal     Tongue: Normal      General Airway Assessment: Adult      Mallampati: II   TM Distance: Normal, at least 6 cm               Dental:  DENTAL FINDINGS: Normal     Chest/Lungs:  Chest/Lungs Clear      Heart/Vascular:  Heart Findings: Normal           Vascular Findings: Normal           Abdomen:  Abdomen Findings: Normal      Musculoskeletal:  Musculoskeletal Findings: Normal   Skin:  Skin Findings: Normal           Anesthesia Plan  Type of Anesthesia, risks & benefits discussed:  Anesthesia Type:  general    Patient's Preference:   Plan Factors:          Intra-op Monitoring Plan: standard ASA monitors  Intra-op Monitoring Plan Comments:   Post Op Pain Control Plan: multimodal analgesia  Post Op Pain Control Plan Comments:     Induction:   IV  Beta Blocker:  Patient is not currently on a Beta-Blocker (No further documentation required).       Informed Consent: Informed consent signed with the Patient and all parties understand the risks and agree with anesthesia plan.  All questions answered.  Anesthesia consent signed with patient.  ASA Score: 3     Day of Surgery Review of History & Physical: I  have interviewed and examined the patient. I have reviewed the patient's H&P dated:  There are no significant changes.            Ready For Surgery From Anesthesia Perspective.         Current Outpatient Medications on File Prior to Encounter   Medication Sig Dispense Refill    amLODIPine (NORVASC) 2.5 MG tablet Take 2.5 mg by mouth once daily.      aspirin (ECOTRIN) 81 MG EC tablet 81 mg.      atorvastatin (LIPITOR) 20 MG tablet TAKE ONE-HALF TABLET BY MOUTH DAILY FOR CHOLESTEROL. STOP MEDICATION AND CALL PROVIDER FOR ANY UNEXPLAINED MUSCLE ACHES,PAIN OR WEAKNESS      azithromycin (Z-SREE) 250 MG tablet Take 1 tablet (250 mg total) by mouth once daily. Take first 2 tablets together, then 1 every day until finished. (Patient not taking: Reported on 1/13/2023) 6 tablet 0    cetirizine (ZYRTEC) 10 MG tablet Take 10 mg by mouth.      cyanocobalamin (VITAMIN B-12) 1000 MCG tablet 1,000 mcg.      diclofenac sodium (VOLTAREN) 1 % Gel APPLY 2 GRAMS TO AFFECTED AREA(S) 2 TIMES A DAY AS NEEDED FOR PAIN AND INFLAMMATION      metFORMIN (FORTAMET) 1,000 mg 24hr tablet Take 1,000 mg by mouth 2 (two) times daily with meals.      pantoprazole (PROTONIX) 40 MG tablet Take 1 tablet (40 mg total) by mouth once daily. 30 tablet 2    pregabalin (LYRICA) 100 MG capsule Take 1 capsule (100 mg total) by mouth 3 (three) times daily. 90 capsule 0    tiZANidine 4 mg Cap Take 4 mg by mouth every 8 (eight) hours as needed (Spasm). (Patient not taking: Reported on 10/3/2023) 90 capsule 0     No current facility-administered medications on file prior to encounter.      Past Medical History:   Diagnosis Date    Diabetes     Diarrhea 08/03/2023    Hypertension     Lumbar post-laminectomy syndrome     Lumbosacral radiculopathy     Sleep apnea     Transient ischemic attack (TIA) 2024        Physical Exam  General: Well nourished and Morbid Obesity    Airway:  Mallampati: II   Mouth Opening: Normal  TM Distance: Normal, at least 6 cm  Tongue:  Normal        Anesthesia Plan  Type of Anesthesia, risks & benefits discussed:    Anesthesia Type: general  Intra-op Monitoring Plan: standard ASA monitors  Post Op Pain Control Plan: multimodal analgesia  Induction:  IV  Informed Consent: Informed consent signed with the Patient and all parties understand the risks and agree with anesthesia plan.  All questions answered.   ASA Score: 3  Day of Surgery Review of History & Physical: I have interviewed and examined the patient. I have reviewed the patient's H&P dated:     Ready For Surgery From Anesthesia Perspective.     .

## 2024-02-22 NOTE — H&P
[facility]  Gastroenterology  H&P    Patient Name: Ant Webber  MRN: [id number]  Admission Date: 2024  Code Status: No Order    Attending Provider: Cally Jorge FNP   Primary Care Physician: Bridgett Johnson MD  Principal Problem:<principal problem not specified>    Subjective:     History of Present Illness: Pt c/o intermittent dysphagia; for egd with esophageal dilation.    Past Medical History:   Diagnosis Date    Diabetes     Diarrhea 2023    Hypertension     Lumbar post-laminectomy syndrome     Lumbosacral radiculopathy     Sleep apnea     Transient ischemic attack (TIA)        Past Surgical History:   Procedure Laterality Date    BACK SURGERY  2013    Bilateral L4-L5 TFESI Bilateral 10-, 2017, 2017    Dr Simms    Bilateral L5-S1 TFESI Bilateral 05/15/2019    Dr Simms    CHOLECYSTOTOMY      EPIDURAL STEROID INJECTION N/A 2024    Procedure: Injection, Steroid, Epidural, L4/5;  Surgeon: Nani Simms MD;  Location: [facility];  Service: Pain Management;  Laterality: N/A;    HAND SURGERY      HIP ARTHROSCOPY W/ LABRAL DEBRIDEMENT      L5-S1 SUSAN  2019    Dr Simms    Left L4-L5 TFESI Left 8-, 2016, 2016    Dr Simms    SELECTIVE INJECTION OF ANESTHETIC AGENT AROUND LUMBAR SPINAL NERVE ROOT BY TRANSFORAMINAL APPROACH Left 2021    Procedure: Left L5-S1 TFESI;  Surgeon: Nani Simms MD;  Location: [facility];  Service: Pain Management;  Laterality: Left;  NO VAC   WILL BE TESTED    RECENTLY HAD COVID    TONSILLECTOMY      UMBILICAL HERNIA REPAIR         Review of patient's allergies indicates:  No Known Allergies  Family History       Problem Relation (Age of Onset)    Diabetes Father    Hypertension Mother    Parkinsonism Mother, Father          Tobacco Use    Smoking status: Former     Current packs/day: 0.00     Types: Cigarettes     Quit date:      Years since quittin.    Smokeless tobacco:  Former     Types: Snuff     Quit date: 2009   Substance and Sexual Activity    Alcohol use: Yes     Comment: occasionally    Drug use: Never    Sexual activity: Not on file     Review of Systems   HENT:  Positive for trouble swallowing.    Respiratory: Negative.     Cardiovascular: Negative.    Gastrointestinal: Negative.      Objective:     Vital Signs (Most Recent):  Pulse: 74 (02/22/24 1219)  Resp: 20 (02/22/24 1219)  BP: 128/80 (02/22/24 1219)  SpO2: 95 % (02/22/24 1219) Vital Signs (24h Range):  Pulse:  [74] 74  Resp:  [20] 20  SpO2:  [95 %] 95 %  BP: (128)/(80) 128/80     Weight: (!) 137.4 kg (303 lb) (02/22/24 1219)  Body mass index is 42.26 kg/m².    No intake or output data in the 24 hours ending 02/22/24 1327    Lines/Drains/Airways       Peripheral Intravenous Line  Duration                  Peripheral IV - Single Lumen 02/22/24 1241 24 G Distal;Left;Posterior Forearm <1 day                    Physical Exam  Vitals reviewed.   Constitutional:       General: He is not in acute distress.     Appearance: Normal appearance. He is well-developed. He is not ill-appearing.   HENT:      Head: Normocephalic and atraumatic.      Nose: Nose normal.   Eyes:      Pupils: Pupils are equal, round, and reactive to light.   Cardiovascular:      Rate and Rhythm: Normal rate and regular rhythm.   Pulmonary:      Effort: Pulmonary effort is normal.      Breath sounds: Normal breath sounds. No wheezing.   Abdominal:      General: Abdomen is flat. Bowel sounds are normal. There is no distension.      Palpations: Abdomen is soft.      Tenderness: There is no abdominal tenderness. There is no guarding.   Skin:     General: Skin is warm and dry.      Coloration: Skin is not jaundiced.   Neurological:      Mental Status: He is alert.   Psychiatric:         Attention and Perception: Attention normal.         Mood and Affect: Affect normal.         Speech: Speech normal.         Behavior: Behavior is cooperative.      Comments: Pt  "was calm while speaking.         Significant Labs:  CBC: No results for input(s): "WBC", "HGB", "HCT", "PLT" in the last 48 hours.  CMP: No results for input(s): "GLU", "CALCIUM", "ALBUMIN", "PROT", "NA", "K", "CO2", "CL", "BUN", "CREATININE", "ALKPHOS", "ALT", "AST", "BILITOT" in the last 48 hours.    Significant Imaging:  Imaging results within the past 24 hours have been reviewed.    Assessment/Plan:     There are no hospital problems to display for this patient.        Imp: esophageal dysphagia  Plan: egd with dilation of the esophagus.    Hipolito Crooks MD  Gastroenterology  [facility] - Endoscopy  "

## 2024-02-23 ENCOUNTER — PATIENT MESSAGE (OUTPATIENT)
Dept: GASTROENTEROLOGY | Facility: CLINIC | Age: 64
End: 2024-02-23
Payer: OTHER GOVERNMENT

## 2024-02-23 LAB
DHEA SERPL-MCNC: NORMAL
ESTROGEN SERPL-MCNC: NORMAL PG/ML
INSULIN SERPL-ACNC: NORMAL U[IU]/ML
LAB AP GROSS DESCRIPTION: NORMAL
LAB AP LABORATORY NOTES: NORMAL
T3RU NFR SERPL: NORMAL %

## 2024-02-23 NOTE — PROGRESS NOTES
EGD biopsies show gastritis. Recommend: repeat esophageal dilation prn; avoid nsaids; continue protonix as needed.

## 2024-02-26 PROBLEM — M54.16 LUMBAR RADICULOPATHY, CHRONIC: Chronic | Status: ACTIVE | Noted: 2024-02-26

## 2024-02-26 NOTE — PROGRESS NOTES
Subjective:         Patient ID: Ant Webber is a 63 y.o. male.    Chief Complaint: Back Pain (Pain is in back,hips and legs)        Pain  This is a chronic problem. The current episode started more than 1 year ago. The problem occurs daily. The problem has been gradually improving. Associated symptoms include arthralgias. Pertinent negatives include no anorexia, chest pain, chills, coughing, diaphoresis, fever, sore throat, vertigo or vomiting.     Review of Systems   Constitutional:  Negative for activity change, appetite change, chills, diaphoresis, fever and unexpected weight change.   HENT:  Negative for drooling, ear discharge, ear pain, facial swelling, mouth dryness, nosebleeds, sore throat, trouble swallowing, voice change and goiter.    Eyes:  Negative for photophobia, pain, discharge, redness and visual disturbance.   Respiratory:  Negative for apnea, cough, choking, chest tightness, shortness of breath, wheezing and stridor.    Cardiovascular:  Negative for chest pain, palpitations and leg swelling.   Gastrointestinal:  Negative for abdominal distention, anorexia, diarrhea, rectal pain, vomiting and fecal incontinence.   Endocrine: Negative for cold intolerance, heat intolerance, polydipsia, polyphagia and polyuria.   Genitourinary:  Negative for bladder incontinence, dysuria, flank pain and frequency.   Musculoskeletal:  Positive for arthralgias, back pain and leg pain.   Integumentary:  Negative for color change and pallor.   Neurological:  Negative for dizziness, vertigo, seizures, syncope, facial asymmetry, speech difficulty, light-headedness, memory loss and coordination difficulties.   Hematological:  Negative for adenopathy. Does not bruise/bleed easily.   Psychiatric/Behavioral:  Negative for agitation, behavioral problems, confusion, decreased concentration, dysphoric mood, hallucinations and suicidal ideas. The patient is not nervous/anxious and is not hyperactive.            Past Medical  "History:   Diagnosis Date    Diabetes     Diarrhea 2023    Hypertension     Lumbar post-laminectomy syndrome     Lumbosacral radiculopathy     Sleep apnea     Transient ischemic attack (TIA)      Past Surgical History:   Procedure Laterality Date    BACK SURGERY  2013    Bilateral L4-L5 TFESI Bilateral 10-, 2017, 2017    Dr Simms    Bilateral L5-S1 TFESI Bilateral 05/15/2019    Dr Simms    CHOLECYSTOTOMY      EPIDURAL STEROID INJECTION N/A 2024    Procedure: Injection, Steroid, Epidural, L4/5;  Surgeon: Nani Simms MD;  Location: [facility];  Service: Pain Management;  Laterality: N/A;    HAND SURGERY      HIP ARTHROSCOPY W/ LABRAL DEBRIDEMENT      L5-S1 SUSAN  2019    Dr Simms    Left L4-L5 TFESI Left 8-, 2016, 2016    Dr Simms    SELECTIVE INJECTION OF ANESTHETIC AGENT AROUND LUMBAR SPINAL NERVE ROOT BY TRANSFORAMINAL APPROACH Left 2021    Procedure: Left L5-S1 TFESI;  Surgeon: Nani Simms MD;  Location: [facility];  Service: Pain Management;  Laterality: Left;  NO VAC   WILL BE TESTED    RECENTLY HAD COVID    TONSILLECTOMY      UMBILICAL HERNIA REPAIR       Social History     Socioeconomic History    Marital status:    Occupational History    Occupation: retail sales   Tobacco Use    Smoking status: Former     Current packs/day: 0.00     Types: Cigarettes     Quit date:      Years since quittin.    Smokeless tobacco: Former     Types: Snuff     Quit date:    Substance and Sexual Activity    Alcohol use: Yes     Comment: occasionally    Drug use: Never     Family History   Problem Relation Age of Onset    Parkinsonism Mother     Hypertension Mother     Diabetes Father     Parkinsonism Father      Review of patient's allergies indicates:  No Known Allergies     Objective:  Vitals:    24 1310 24 1311   BP: (!) 140/78    Pulse: 90    Weight: (!) 137 kg (302 lb)    Height: 5' 11" (1.803 m)  "   PainSc:   4   4         Physical Exam  Vitals and nursing note reviewed. Exam conducted with a chaperone present.   Constitutional:       General: He is awake. He is not in acute distress.     Appearance: Normal appearance. He is not ill-appearing or diaphoretic.   HENT:      Head: Normocephalic and atraumatic.      Nose: Nose normal.      Mouth/Throat:      Mouth: Mucous membranes are moist.      Pharynx: Oropharynx is clear.   Eyes:      Conjunctiva/sclera: Conjunctivae normal.      Pupils: Pupils are equal, round, and reactive to light.   Cardiovascular:      Rate and Rhythm: Normal rate.   Pulmonary:      Effort: Pulmonary effort is normal. No respiratory distress.   Abdominal:      Palpations: Abdomen is soft.      Tenderness: There is no guarding.   Musculoskeletal:         General: Normal range of motion.      Cervical back: Normal range of motion and neck supple. No rigidity.      Thoracic back: Tenderness present.      Lumbar back: Tenderness present.   Skin:     General: Skin is warm and dry.      Coloration: Skin is not jaundiced or pale.   Neurological:      General: No focal deficit present.      Mental Status: He is alert and oriented to person, place, and time. Mental status is at baseline.      Cranial Nerves: No cranial nerve deficit (II-XII).   Psychiatric:         Mood and Affect: Mood normal.         Behavior: Behavior normal. Behavior is cooperative.         Thought Content: Thought content normal.           EGD w Dilation  Narrative: Table formatting from the original result was not included.  Procedure Date  2/22/24    Impression  Overall   Impression:    Performed forceps biopsies in the esophagus and stomach  Dilation in the esophagus  Dilated in the esophagus with Echols dilator  Erythematous mucosa in the fundus of the stomach and antrum; performed   cold forceps biopsy  Mucosa of the esophagus was grossly normal. The distal esophagus was   biopsied and the esophagus was dilated with a  48F Echols dilator. The   stomach has gastritis and biopsies were obtained. The pyloric channel is   patent. Mucosa of the duodenum is normal appearing.    Recommendation   Await pathology results     Avoid nsaids; use PPI or H2RA daily,  repeat esophageal dilation prn;   schedule colonoscopy for history of ssa polyps.  Discharge: disp; DC to home. Resume diet. No driving x 24 hours. Follow-up   with PCP as scheduled.  Dx: gerd, gastritis, esophageal dysphagia, s/p dilation of the esophagus.    Indication  Abdominal pain, unspecified abdominal location, Dysphagia, unspecified   type    Providers  Joanna Marcus Technician   Traci Plummer RN Registered Nurse   Ralph, CIELO Cortes CRNA, Vincent C., MD Proceduralist     Medications  Moderate sedation administered by anesthesia staff - See anesthesia   record.    Preprocedure  A history and physical has been performed, and patient medication   allergies have been reviewed. The patient's tolerance of previous   anesthesia has been reviewed. The risks and benefits of the procedure and   the sedation options and risks were discussed with the patient. All   questions were answered and informed consent obtained.    ASA Score: ASA 2 - Patient with mild systemic disease with no functional   limitations  Mallampati Airway Score: II (hard and soft palate, upper portion of   tonsils anduvula visible)    Details of the Procedure  The patient underwent monitored anesthesia care, which was administered by   an anesthesia professional. The patient's blood pressure, heart rate,   level of consciousness, oxygen, respirations, ECG and ETCO2 were monitored   throughout the procedure. The scope was introduced through the mouth and   advanced to the third part of the duodenum. Retroflexion was performed in   the cardia. Prior to the procedure, the patient's H. Pylori status was   unknown. The patient's estimated blood loss was minimal (<5 mL). The   procedure was not  difficult. The patient tolerated the procedure well.   There were no apparent adverse events.     Scope: Gastroscope  Scope Serial: [id number]    Events  Procedure Events   Event Event Time     Procedure Events   Event Event Time   SCOPE IN 2/22/2024  1:35 PM   SCOPE OUT 2/22/2024  1:39 PM     Findings  Performed multiple forceps biopsies in the esophagus and stomach  Dilation in the esophagus. 48FR Echols  Dilated in the esophagus with Echols dilator  Erythematous mucosa in the fundus of the stomach and antrum; performed   cold forceps biopsy         Hospital Outpatient Visit on 02/22/2024   Component Date Value Ref Range Status    POC Glucose 02/22/2024 98  70 - 105 mg/dL Final    Case Report 02/22/2024    Final                    Value:Surgical Pathology                                Case: [id number]                                   Authorizing Provider:  Hipolito Crooks MD     Collected:           02/22/2024 01:36 PM          Ordering Location:     [facility] -         Received:            02/22/2024 02:46 PM                                 Endoscopy                                                                    Pathologist:           Robbie Ritchie MD                                                      Specimens:   A) - Stomach, A. Stomach bx                                                                         B) - Esophagus, B. Esophagus bx                                                            Final Diagnosis 02/22/2024    Final                    Value:This result contains rich text formatting which cannot be displayed here.    Comments 02/22/2024    Final                    Value:This result contains rich text formatting which cannot be displayed here.    Gross Description 02/22/2024    Final                    Value:This result contains rich text formatting which cannot be displayed here.    Microscopic Description 02/22/2024    Final                    Value:This result  contains rich text formatting which cannot be displayed here.    Laboratory Notes 02/22/2024    Final                    Value:This result contains rich text formatting which cannot be displayed here.   Admission on 02/13/2024, Discharged on 02/13/2024   Component Date Value Ref Range Status    POC Glucose 02/13/2024 97  70 - 105 mg/dL Final   Lab Visit on 12/04/2023   Component Date Value Ref Range Status    Sodium 12/04/2023 137  136 - 145 mmol/L Final    Potassium 12/04/2023 4.3  3.5 - 5.1 mmol/L Final    Chloride 12/04/2023 101  98 - 107 mmol/L Final    CO2 12/04/2023 31  21 - 32 mmol/L Final    Anion Gap 12/04/2023 9  7 - 16 mmol/L Final    Glucose 12/04/2023 129 (H)  74 - 106 mg/dL Final    BUN 12/04/2023 17  7 - 18 mg/dL Final    Creatinine 12/04/2023 1.02  0.70 - 1.30 mg/dL Final    BUN/Creatinine Ratio 12/04/2023 17  6 - 20 Final    Calcium 12/04/2023 10.0  8.5 - 10.1 mg/dL Final    eGFR 12/04/2023 83  >=60 mL/min/1.73m2 Final         No orders of the defined types were placed in this encounter.        Requested Prescriptions     Signed Prescriptions Disp Refills    pregabalin (LYRICA) 100 MG capsule 90 capsule 5     Sig: Take 1 capsule (100 mg total) by mouth 3 (three) times daily.    tiZANidine 4 mg Cap 90 capsule 5     Sig: Take 4 mg by mouth every 8 (eight) hours as needed (Spasm).       Assessment:     1. Lumbar radiculopathy, chronic    2. Thoracic spine pain    3. Lumbosacral radiculopathy           Lumbar myelogram [facility] in 19 2022 degenerative changes L3/4 bulging disc no central canal stenosis noted moderate left and mild right foraminal stenosis  L5/S1 mild right foraminal stenosis    X-ray lumbar spine ARM February 2022 postoperative changes noted scoliosis noted mild, diffuse demineralization multiple level degenerative changes    Bone density study [facility] May 2022 osteopenia associated with least 2 times increased risk for fracture      Plan:    Not using narcotics from our  office    History lumbar surgery 2013 [facility]    Cannot have MRI, has implantable device for sleep apnea inspire  Dr. Ceja [facility]    Spine surgery recommended spinal cord stimulator, surgery last resort      Follow-up after lumbar L4/5 SUSAN # 1, February 13, 2024  He states he had 90% relief after procedure, his procedure did help improve his level function    He would like to continue with conservative management this time    Continue home exercise program as directed    Continue nonnarcotic medication as directed    Follow-up as needed, patient request    Dr. Simms February 2025

## 2024-02-27 ENCOUNTER — OFFICE VISIT (OUTPATIENT)
Dept: PAIN MEDICINE | Facility: CLINIC | Age: 64
End: 2024-02-27
Payer: OTHER GOVERNMENT

## 2024-02-27 VITALS
WEIGHT: 302 LBS | HEIGHT: 71 IN | BODY MASS INDEX: 42.28 KG/M2 | HEART RATE: 90 BPM | DIASTOLIC BLOOD PRESSURE: 78 MMHG | SYSTOLIC BLOOD PRESSURE: 140 MMHG

## 2024-02-27 DIAGNOSIS — M54.17 LUMBOSACRAL RADICULOPATHY: Chronic | ICD-10-CM

## 2024-02-27 DIAGNOSIS — M54.16 LUMBAR RADICULOPATHY, CHRONIC: Primary | Chronic | ICD-10-CM

## 2024-02-27 DIAGNOSIS — M54.6 THORACIC SPINE PAIN: Chronic | ICD-10-CM

## 2024-02-27 PROCEDURE — 99214 OFFICE O/P EST MOD 30 MIN: CPT | Mod: PBBFAC | Performed by: PHYSICIAN ASSISTANT

## 2024-02-27 PROCEDURE — 99214 OFFICE O/P EST MOD 30 MIN: CPT | Mod: S$PBB,,, | Performed by: PHYSICIAN ASSISTANT

## 2024-02-27 RX ORDER — TIZANIDINE HYDROCHLORIDE 4 MG/1
4 CAPSULE, GELATIN COATED ORAL EVERY 8 HOURS PRN
Qty: 90 CAPSULE | Refills: 5 | Status: SHIPPED | OUTPATIENT
Start: 2024-02-27

## 2024-02-27 RX ORDER — PREGABALIN 100 MG/1
100 CAPSULE ORAL 3 TIMES DAILY
Qty: 90 CAPSULE | Refills: 5 | Status: SHIPPED | OUTPATIENT
Start: 2024-02-27

## 2024-03-04 ENCOUNTER — OFFICE VISIT (OUTPATIENT)
Dept: GASTROENTEROLOGY | Facility: CLINIC | Age: 64
End: 2024-03-04
Payer: OTHER GOVERNMENT

## 2024-03-04 VITALS
HEIGHT: 71 IN | HEART RATE: 90 BPM | WEIGHT: 300.63 LBS | BODY MASS INDEX: 42.09 KG/M2 | SYSTOLIC BLOOD PRESSURE: 136 MMHG | DIASTOLIC BLOOD PRESSURE: 83 MMHG

## 2024-03-04 DIAGNOSIS — Z86.010 PERSONAL HISTORY OF COLONIC POLYPS: Primary | ICD-10-CM

## 2024-03-04 DIAGNOSIS — R12 HEARTBURN: ICD-10-CM

## 2024-03-04 DIAGNOSIS — K59.00 CONSTIPATION, UNSPECIFIED CONSTIPATION TYPE: ICD-10-CM

## 2024-03-04 DIAGNOSIS — K29.00 ACUTE SUPERFICIAL GASTRITIS WITHOUT HEMORRHAGE: ICD-10-CM

## 2024-03-04 PROCEDURE — 99214 OFFICE O/P EST MOD 30 MIN: CPT | Mod: PBBFAC | Performed by: NURSE PRACTITIONER

## 2024-03-04 PROCEDURE — 99214 OFFICE O/P EST MOD 30 MIN: CPT | Mod: S$PBB,,, | Performed by: NURSE PRACTITIONER

## 2024-03-04 RX ORDER — PANTOPRAZOLE SODIUM 40 MG/1
40 TABLET, DELAYED RELEASE ORAL DAILY
Qty: 30 TABLET | Refills: 2 | Status: SHIPPED | OUTPATIENT
Start: 2024-03-04 | End: 2024-03-04 | Stop reason: SDUPTHER

## 2024-03-04 RX ORDER — PANTOPRAZOLE SODIUM 40 MG/1
40 TABLET, DELAYED RELEASE ORAL DAILY
Qty: 30 TABLET | Refills: 0 | Status: SHIPPED | OUTPATIENT
Start: 2024-03-04 | End: 2024-04-24

## 2024-03-04 NOTE — PROGRESS NOTES
Ant Webber is a 63 y.o. male here for Follow-up        PCP: Bridgett Johnson  Referring Provider: No referring provider defined for this encounter.     HPI:  Presents for follow-up after EGD dilation.  EGD dilation on 02/22/2024, gastritis.  He denies any dysphagia today.  Continues to report increased heartburn.  He is currently taking Protonix.  Gastritis and mild chronic inflammation noted in the esophagus.  No intestinal metaplasia or dysplasia.  No hematochezia or melena.  Chronic constipation.  Reports intermittent right upper quadrant abdominal pain that is resolved after bowel movement.  He does take MiraLax powder.  Last colonoscopy was 01/11/2021, sessile serrated adenoma. I attempted to review the pathology in [facility] but I was unable to view. No anemia on labs in August.    Follow-up  Pertinent negatives include no abdominal pain, change in bowel habit, fatigue, fever, nausea or vomiting.         ROS:  Review of Systems   Constitutional:  Negative for activity change, appetite change, fatigue, fever and unexpected weight change.   HENT:  Negative for trouble swallowing.    Gastrointestinal:  Positive for constipation and reflux. Negative for abdominal distention, abdominal pain, blood in stool, change in bowel habit, diarrhea, nausea and vomiting.   Musculoskeletal:  Negative for gait problem.   Integumentary:  Negative for color change.   Psychiatric/Behavioral:  Negative for sleep disturbance. The patient is not nervous/anxious.           PMHX:  has a past medical history of Diabetes, Diarrhea (08/03/2023), Hypertension, Lumbar post-laminectomy syndrome, Lumbosacral radiculopathy, Sleep apnea, and Transient ischemic attack (TIA) (2024).    PSHX:  has a past surgical history that includes Hip arthroscopy w/ labral debridement (2020); Back surgery (2013); Hand surgery (2003); Tonsillectomy; Umbilical hernia repair; Cholecystotomy; L5-S1 SUSAN (02/27/2019); Bilateral L4-L5 TFESI (Bilateral, 10-,  8-2-2017, 6-); Left L4-L5 TFESI (Left, 8-, 7-, 6-8-2016); Bilateral L5-S1 TFESI (Bilateral, 05/15/2019); Selective injection of anesthetic agent around lumbar spinal nerve root by transforaminal approach (Left, 9/16/2021); and Epidural steroid injection (N/A, 2/13/2024).    PFHX: family history includes Diabetes in his father; Hypertension in his mother; Parkinsonism in his father and mother.    PSlHX:  reports that he quit smoking about 25 years ago. His smoking use included cigarettes. He quit smokeless tobacco use about 15 years ago.  His smokeless tobacco use included snuff. He reports current alcohol use. He reports that he does not use drugs.        Review of patient's allergies indicates:  No Known Allergies    Medication List with Changes/Refills   Current Medications    AMLODIPINE (NORVASC) 2.5 MG TABLET    Take 2.5 mg by mouth once daily.    ASPIRIN (ECOTRIN) 81 MG EC TABLET    81 mg.    ATORVASTATIN (LIPITOR) 20 MG TABLET    TAKE ONE-HALF TABLET BY MOUTH DAILY FOR CHOLESTEROL. STOP MEDICATION AND CALL PROVIDER FOR ANY UNEXPLAINED MUSCLE ACHES,PAIN OR WEAKNESS    AZITHROMYCIN (Z-SREE) 250 MG TABLET    Take 1 tablet (250 mg total) by mouth once daily. Take first 2 tablets together, then 1 every day until finished.    CETIRIZINE (ZYRTEC) 10 MG TABLET    Take 10 mg by mouth.    CYANOCOBALAMIN (VITAMIN B-12) 1000 MCG TABLET    1,000 mcg.    DICLOFENAC SODIUM (VOLTAREN) 1 % GEL    APPLY 2 GRAMS TO AFFECTED AREA(S) 2 TIMES A DAY AS NEEDED FOR PAIN AND INFLAMMATION    METFORMIN (FORTAMET) 1,000 MG 24HR TABLET    Take 1,000 mg by mouth 2 (two) times daily with meals.    PREGABALIN (LYRICA) 100 MG CAPSULE    Take 1 capsule (100 mg total) by mouth 3 (three) times daily.    TIZANIDINE 4 MG CAP    Take 4 mg by mouth every 8 (eight) hours as needed (Spasm).   Changed and/or Refilled Medications    Modified Medication Previous Medication    PANTOPRAZOLE (PROTONIX) 40 MG TABLET pantoprazole (PROTONIX)  "40 MG tablet       Take 1 tablet (40 mg total) by mouth once daily.    Take 1 tablet (40 mg total) by mouth once daily.        Objective Findings:  Vital Signs:  /83   Pulse 90   Ht 5' 11" (1.803 m)   Wt (!) 136.4 kg (300 lb 9.6 oz)   BMI 41.93 kg/m²  Body mass index is 41.93 kg/m².    Physical Exam:  Physical Exam  Vitals and nursing note reviewed.   Constitutional:       General: He is not in acute distress.     Appearance: Normal appearance. He is obese.   HENT:      Mouth/Throat:      Mouth: Mucous membranes are moist.   Cardiovascular:      Rate and Rhythm: Normal rate.   Pulmonary:      Effort: Pulmonary effort is normal.      Breath sounds: No wheezing, rhonchi or rales.   Abdominal:      General: Bowel sounds are normal. There is no distension.      Palpations: Abdomen is soft. There is no mass.      Tenderness: There is no abdominal tenderness. There is no guarding.   Skin:     General: Skin is warm and dry.      Coloration: Skin is not jaundiced or pale.   Neurological:      Mental Status: He is alert and oriented to person, place, and time.   Psychiatric:         Mood and Affect: Mood normal.          Labs:  Lab Results   Component Value Date    WBC 6.62 08/03/2023    HGB 15.0 08/03/2023    HCT 47.3 08/03/2023    MCV 92.9 08/03/2023    RDW 13.6 08/03/2023     08/03/2023    LYMPH 7.3 (L) 08/03/2023    LYMPH 0.48 (L) 08/03/2023    MONO 10.9 (H) 08/03/2023    EOS 0.37 08/03/2023    BASO 0.07 08/03/2023     Lab Results   Component Value Date     12/04/2023    K 4.3 12/04/2023     12/04/2023    CO2 31 12/04/2023     (H) 12/04/2023    BUN 17 12/04/2023    CREATININE 1.02 12/04/2023    CALCIUM 10.0 12/04/2023    PROT 7.5 08/03/2023    ALBUMIN 3.5 08/03/2023    BILITOT 0.3 08/03/2023    ALKPHOS 66 08/03/2023    AST 21 08/03/2023    ALT 42 08/03/2023         Imaging: EGD w Dilation    Result Date: 2/22/2024  Table formatting from the original result was not included. Procedure " Date 2/22/24 Impression Overall      Performed forceps biopsies in the esophagus and stomach Dilation in the esophagus Dilated in the esophagus with Echols dilator Erythematous mucosa in the fundus of the stomach and antrum; performed cold forceps biopsy Mucosa of the esophagus was grossly normal. The distal esophagus was biopsied and the esophagus was dilated with a 48F Echols dilator. The stomach has gastritis and biopsies were obtained. The pyloric channel is patent. Mucosa of the duodenum is normal appearing. Recommendation  Await pathology results Avoid nsaids; use PPI or H2RA daily,  repeat esophageal dilation prn; schedule colonoscopy for history of ssa polyps. Discharge: disp; DC to home. Resume diet. No driving x 24 hours. Follow-up with PCP as scheduled. Dx: gerd, gastritis, esophageal dysphagia, s/p dilation of the esophagus. Indication Abdominal pain, unspecified abdominal location, Dysphagia, unspecified type Providers Joanna Marcus Technician Traci Plummer, RN Registered Nurse Ralph, CIELO Cortes CRNA, Vincent C., MD Proceduralist Medications Moderate sedation administered by anesthesia staff - See anesthesia record. Preprocedure A history and physical has been performed, and patient medication allergies have been reviewed. The patient's tolerance of previous anesthesia has been reviewed. The risks and benefits of the procedure and the sedation options and risks were discussed with the patient. All questions were answered and informed consent obtained. ASA Score: ASA 2 - Patient with mild systemic disease with no functional limitations Mallampati Airway Score: II (hard and soft palate, upper portion of tonsils anduvula visible) Details of the Procedure The patient underwent monitored anesthesia care, which was administered by an anesthesia professional. The patient's blood pressure, heart rate, level of consciousness, oxygen, respirations, ECG and ETCO2 were monitored throughout the  procedure. The scope was introduced through the mouth and advanced to the third part of the duodenum. Retroflexion was performed in the cardia. Prior to the procedure, the patient's H. Pylori status was unknown. The patient's estimated blood loss was minimal (<5 mL). The procedure was not difficult. The patient tolerated the procedure well. There were no apparent adverse events. Scope: Gastroscope Scope Serial: [id number] Events Procedure Events Event Event Time Procedure Events Event Event Time SCOPE IN 2/22/2024  1:35 PM SCOPE OUT 2/22/2024  1:39 PM Findings Performed multiple forceps biopsies in the esophagus and stomach Dilation in the esophagus. 48FR Echols Dilated in the esophagus with Echols dilator Erythematous mucosa in the fundus of the stomach and antrum; performed cold forceps biopsy     FL Fluoro for Pain Management    Result Date: 2/13/2024  See OP Notes for results. IMPRESSION: See OP Notes for results. This procedure was auto-finalized by: Virtual Radiologist        Assessment:  Ant Webber is a 63 y.o. male here with:  1. Personal history of colonic polyps    2. Heartburn    3. Constipation, unspecified constipation type    4. Acute superficial gastritis without hemorrhage          Recommendations:  1. Continue Protonix  2. Avoid spicy, greasy foods  Avoid caffeine, citric acid, chocolate, peppermint, and carbonated drinks  Do not lay down within 3 hours of eating  Exercise 150 minutes per week  Increase fluid to 64 ounces daily  Avoid antiinflammatory medications such as motrin, advil, aleve, ibuprofen, and BC powder  3. Continue MiraLax powder 17 grams in 8 ounces of liquid  4. Schedule colonoscopy, personal history of sessile serrated adenoma in 2021  5. Patient will call back to schedule follow up    No follow-ups on file.      Order summary:  Orders Placed This Encounter    pantoprazole (PROTONIX) 40 MG tablet    Colonoscopy       Thank you for allowing me to participate in the care of Ant QUINTANA  Akbar.      Cally Jorge, JAVIDP-C

## 2024-04-09 ENCOUNTER — OFFICE VISIT (OUTPATIENT)
Dept: PAIN MEDICINE | Facility: CLINIC | Age: 64
End: 2024-04-09
Payer: OTHER GOVERNMENT

## 2024-04-09 VITALS
DIASTOLIC BLOOD PRESSURE: 84 MMHG | RESPIRATION RATE: 18 BRPM | BODY MASS INDEX: 42.42 KG/M2 | SYSTOLIC BLOOD PRESSURE: 158 MMHG | WEIGHT: 303 LBS | HEIGHT: 71 IN | HEART RATE: 88 BPM

## 2024-04-09 DIAGNOSIS — M54.17 LUMBOSACRAL RADICULOPATHY: Primary | Chronic | ICD-10-CM

## 2024-04-09 DIAGNOSIS — M54.6 THORACIC SPINE PAIN: Chronic | ICD-10-CM

## 2024-04-09 PROCEDURE — 99214 OFFICE O/P EST MOD 30 MIN: CPT | Mod: PBBFAC | Performed by: PHYSICIAN ASSISTANT

## 2024-04-09 PROCEDURE — 96372 THER/PROPH/DIAG INJ SC/IM: CPT | Mod: PBBFAC | Performed by: PHYSICIAN ASSISTANT

## 2024-04-09 PROCEDURE — 99999PBSHW PR PBB SHADOW TECHNICAL ONLY FILED TO HB: Mod: PBBFAC,,,

## 2024-04-09 PROCEDURE — 99214 OFFICE O/P EST MOD 30 MIN: CPT | Mod: S$PBB,25,, | Performed by: PHYSICIAN ASSISTANT

## 2024-04-09 RX ORDER — KETOROLAC TROMETHAMINE 30 MG/ML
60 INJECTION, SOLUTION INTRAMUSCULAR; INTRAVENOUS
Status: COMPLETED | OUTPATIENT
Start: 2024-04-09 | End: 2024-04-09

## 2024-04-09 RX ORDER — METHYLPREDNISOLONE 4 MG/1
TABLET ORAL
Qty: 21 TABLET | Refills: 0 | Status: SHIPPED | OUTPATIENT
Start: 2024-04-09 | End: 2024-05-29

## 2024-04-09 RX ADMIN — KETOROLAC TROMETHAMINE 60 MG: 30 INJECTION, SOLUTION INTRAMUSCULAR at 02:04

## 2024-04-09 NOTE — PROGRESS NOTES
Subjective:         Patient ID: Ant Webber is a 63 y.o. male.    Chief Complaint: Low-back Pain and Leg Pain (Bilateral- left more)        Pain  This is a chronic problem. The current episode started more than 1 year ago. The problem occurs daily. The problem has been waxing and waning. Associated symptoms include arthralgias. Pertinent negatives include no anorexia, chest pain, chills, coughing, diaphoresis, fever, sore throat, vertigo or vomiting.     Review of Systems   Constitutional:  Negative for activity change, appetite change, chills, diaphoresis, fever and unexpected weight change.   HENT:  Negative for drooling, ear discharge, ear pain, facial swelling, mouth dryness, nosebleeds, sore throat, trouble swallowing, voice change and goiter.    Eyes:  Negative for photophobia, pain, discharge, redness and visual disturbance.   Respiratory:  Negative for apnea, cough, choking, chest tightness, shortness of breath, wheezing and stridor.    Cardiovascular:  Negative for chest pain, palpitations and leg swelling.   Gastrointestinal:  Negative for abdominal distention, anorexia, diarrhea, rectal pain, vomiting and fecal incontinence.   Endocrine: Negative for cold intolerance, heat intolerance, polydipsia, polyphagia and polyuria.   Genitourinary:  Negative for bladder incontinence, dysuria, flank pain and frequency.   Musculoskeletal:  Positive for arthralgias, back pain and leg pain.   Integumentary:  Negative for color change and pallor.   Neurological:  Negative for dizziness, vertigo, seizures, syncope, facial asymmetry, speech difficulty, light-headedness, memory loss and coordination difficulties.   Hematological:  Negative for adenopathy. Does not bruise/bleed easily.   Psychiatric/Behavioral:  Negative for agitation, behavioral problems, confusion, decreased concentration, dysphoric mood, hallucinations and suicidal ideas. The patient is not nervous/anxious and is not hyperactive.            Past  "Medical History:   Diagnosis Date    Diabetes     Diarrhea 2023    Hypertension     Lumbar post-laminectomy syndrome     Lumbosacral radiculopathy     Sleep apnea     Transient ischemic attack (TIA)      Past Surgical History:   Procedure Laterality Date    BACK SURGERY  2013    Bilateral L4-L5 TFESI Bilateral 10-, 2017, 2017    Dr Simms    Bilateral L5-S1 TFESI Bilateral 05/15/2019    Dr Simms    CHOLECYSTOTOMY      EPIDURAL STEROID INJECTION N/A 2024    Procedure: Injection, Steroid, Epidural, L4/5;  Surgeon: Nani Simms MD;  Location: [facility];  Service: Pain Management;  Laterality: N/A;    HAND SURGERY      HIP ARTHROSCOPY W/ LABRAL DEBRIDEMENT      L5-S1 SUSAN  2019    Dr Simms    Left L4-L5 TFESI Left 8-, 2016, 2016    Dr Simms    SELECTIVE INJECTION OF ANESTHETIC AGENT AROUND LUMBAR SPINAL NERVE ROOT BY TRANSFORAMINAL APPROACH Left 2021    Procedure: Left L5-S1 TFESI;  Surgeon: Nani Simms MD;  Location: [facility];  Service: Pain Management;  Laterality: Left;  NO VAC   WILL BE TESTED    RECENTLY HAD COVID    TONSILLECTOMY      UMBILICAL HERNIA REPAIR       Social History     Socioeconomic History    Marital status:    Occupational History    Occupation: retail sales   Tobacco Use    Smoking status: Former     Current packs/day: 0.00     Types: Cigarettes     Quit date:      Years since quittin.2    Smokeless tobacco: Former     Types: Snuff     Quit date:    Substance and Sexual Activity    Alcohol use: Yes     Comment: occasionally    Drug use: Never     Family History   Problem Relation Age of Onset    Parkinsonism Mother     Hypertension Mother     Diabetes Father     Parkinsonism Father      Review of patient's allergies indicates:  No Known Allergies     Objective:  Vitals:    24 1425   BP: (!) 158/84   Pulse: 88   Resp: 18   Weight: (!) 137.4 kg (303 lb)   Height: 5' 11" (1.803 " m)   PainSc:   9         Physical Exam  Vitals and nursing note reviewed. Exam conducted with a chaperone present.   Constitutional:       General: He is awake. He is not in acute distress.     Appearance: Normal appearance. He is not ill-appearing or diaphoretic.   HENT:      Head: Normocephalic and atraumatic.      Nose: Nose normal.      Mouth/Throat:      Mouth: Mucous membranes are moist.      Pharynx: Oropharynx is clear.   Eyes:      Conjunctiva/sclera: Conjunctivae normal.      Pupils: Pupils are equal, round, and reactive to light.   Cardiovascular:      Rate and Rhythm: Normal rate.   Pulmonary:      Effort: Pulmonary effort is normal. No respiratory distress.   Abdominal:      Palpations: Abdomen is soft.      Tenderness: There is no guarding.   Musculoskeletal:         General: Normal range of motion.      Cervical back: Normal range of motion and neck supple. No rigidity.      Thoracic back: Tenderness present.      Lumbar back: Tenderness present.   Skin:     General: Skin is warm and dry.      Coloration: Skin is not jaundiced or pale.   Neurological:      General: No focal deficit present.      Mental Status: He is alert and oriented to person, place, and time. Mental status is at baseline.      Cranial Nerves: No cranial nerve deficit (II-XII).   Psychiatric:         Mood and Affect: Mood normal.         Behavior: Behavior normal. Behavior is cooperative.         Thought Content: Thought content normal.           EGD w Dilation  Narrative: Table formatting from the original result was not included.  Procedure Date  2/22/24    Impression  Overall   Impression:    Performed forceps biopsies in the esophagus and stomach  Dilation in the esophagus  Dilated in the esophagus with Echols dilator  Erythematous mucosa in the fundus of the stomach and antrum; performed   cold forceps biopsy  Mucosa of the esophagus was grossly normal. The distal esophagus was   biopsied and the esophagus was dilated with a  48F Echols dilator. The   stomach has gastritis and biopsies were obtained. The pyloric channel is   patent. Mucosa of the duodenum is normal appearing.    Recommendation   Await pathology results     Avoid nsaids; use PPI or H2RA daily,  repeat esophageal dilation prn;   schedule colonoscopy for history of ssa polyps.  Discharge: disp; DC to home. Resume diet. No driving x 24 hours. Follow-up   with PCP as scheduled.  Dx: gerd, gastritis, esophageal dysphagia, s/p dilation of the esophagus.    Indication  Abdominal pain, unspecified abdominal location, Dysphagia, unspecified   type    Providers  Joanna Marcus Technician   Traci Plummer RN Registered Nurse   Ralph, CIELO Cortes CRNA, Vincent C., MD Proceduralist     Medications  Moderate sedation administered by anesthesia staff - See anesthesia   record.    Preprocedure  A history and physical has been performed, and patient medication   allergies have been reviewed. The patient's tolerance of previous   anesthesia has been reviewed. The risks and benefits of the procedure and   the sedation options and risks were discussed with the patient. All   questions were answered and informed consent obtained.    ASA Score: ASA 2 - Patient with mild systemic disease with no functional   limitations  Mallampati Airway Score: II (hard and soft palate, upper portion of   tonsils anduvula visible)    Details of the Procedure  The patient underwent monitored anesthesia care, which was administered by   an anesthesia professional. The patient's blood pressure, heart rate,   level of consciousness, oxygen, respirations, ECG and ETCO2 were monitored   throughout the procedure. The scope was introduced through the mouth and   advanced to the third part of the duodenum. Retroflexion was performed in   the cardia. Prior to the procedure, the patient's H. Pylori status was   unknown. The patient's estimated blood loss was minimal (<5 mL). The   procedure was not  difficult. The patient tolerated the procedure well.   There were no apparent adverse events.     Scope: Gastroscope  Scope Serial: [id number]    Events  Procedure Events   Event Event Time     Procedure Events   Event Event Time   SCOPE IN 2/22/2024  1:35 PM   SCOPE OUT 2/22/2024  1:39 PM     Findings  Performed multiple forceps biopsies in the esophagus and stomach  Dilation in the esophagus. 48FR Echols  Dilated in the esophagus with Echols dilator  Erythematous mucosa in the fundus of the stomach and antrum; performed   cold forceps biopsy         Hospital Outpatient Visit on 02/22/2024   Component Date Value Ref Range Status    POC Glucose 02/22/2024 98  70 - 105 mg/dL Final    Case Report 02/22/2024    Final                    Value:Surgical Pathology                                Case: [id number]                                   Authorizing Provider:  Hipolito Crooks MD     Collected:           02/22/2024 01:36 PM          Ordering Location:     [facility] -         Received:            02/22/2024 02:46 PM                                 Endoscopy                                                                    Pathologist:           Robbie Ritchie MD                                                      Specimens:   A) - Stomach, A. Stomach bx                                                                         B) - Esophagus, B. Esophagus bx                                                            Final Diagnosis 02/22/2024    Final                    Value:This result contains rich text formatting which cannot be displayed here.    Comments 02/22/2024    Final                    Value:This result contains rich text formatting which cannot be displayed here.    Gross Description 02/22/2024    Final                    Value:This result contains rich text formatting which cannot be displayed here.    Microscopic Description 02/22/2024    Final                    Value:This result  contains rich text formatting which cannot be displayed here.    Laboratory Notes 02/22/2024    Final                    Value:This result contains rich text formatting which cannot be displayed here.   Admission on 02/13/2024, Discharged on 02/13/2024   Component Date Value Ref Range Status    POC Glucose 02/13/2024 97  70 - 105 mg/dL Final   Lab Visit on 12/04/2023   Component Date Value Ref Range Status    Sodium 12/04/2023 137  136 - 145 mmol/L Final    Potassium 12/04/2023 4.3  3.5 - 5.1 mmol/L Final    Chloride 12/04/2023 101  98 - 107 mmol/L Final    CO2 12/04/2023 31  21 - 32 mmol/L Final    Anion Gap 12/04/2023 9  7 - 16 mmol/L Final    Glucose 12/04/2023 129 (H)  74 - 106 mg/dL Final    BUN 12/04/2023 17  7 - 18 mg/dL Final    Creatinine 12/04/2023 1.02  0.70 - 1.30 mg/dL Final    BUN/Creatinine Ratio 12/04/2023 17  6 - 20 Final    Calcium 12/04/2023 10.0  8.5 - 10.1 mg/dL Final    eGFR 12/04/2023 83  >=60 mL/min/1.73m2 Final         No orders of the defined types were placed in this encounter.        Requested Prescriptions     Signed Prescriptions Disp Refills    methylPREDNISolone (MEDROL DOSEPACK) 4 mg tablet 21 tablet 0     Sig: use as directed       Assessment:     1. Lumbosacral radiculopathy    2. Thoracic spine pain           Lumbar myelogram [facility] [place] in 19 2022 degenerative changes L3/4 bulging disc no central canal stenosis noted moderate left and mild right foraminal stenosis  L5/S1 mild right foraminal stenosis    X-ray lumbar spine [facility] February 2022 postoperative changes noted scoliosis noted mild, diffuse demineralization multiple level degenerative changes    Bone density study [facility] [place] May 2022 osteopenia associated with least 2 times increased risk for fracture      Plan:    Not using narcotics from our office    History lumbar surgery 2013 [facility] [place]    Has implantable device for sleep apnea inspire  Dr. Marcial Britt has card in his position MRI  conditional    Spine surgery recommended spinal cord stimulator, surgery last resort      He had lumbar L4/5 SUSAN # 1, February 13, 2024  He states he had 90% relief after procedure, his procedure did help improve his level function    Complaint left greater than right leg pain numbness and tingling radicular in nature slowly returning     He is requesting Toradol injection     After discussing options will proceed with     Toradol 60 mg IM, tolerated well     At [facility] Dosepak as directed     Considering MRI lumbar spine    He would like to continue with conservative management this time    Continue home exercise program as directed    Continue nonnarcotic medication as directed    Follow-up as needed, patient request    Dr. Simms February 2025

## 2024-04-24 ENCOUNTER — HOSPITAL ENCOUNTER (OUTPATIENT)
Dept: GASTROENTEROLOGY | Facility: HOSPITAL | Age: 64
Discharge: HOME | End: 2024-04-24
Attending: NURSE PRACTITIONER | Admitting: INTERNAL MEDICINE
Payer: OTHER GOVERNMENT

## 2024-04-24 ENCOUNTER — ANESTHESIA EVENT (OUTPATIENT)
Dept: GASTROENTEROLOGY | Facility: HOSPITAL | Age: 64
End: 2024-04-24
Payer: OTHER GOVERNMENT

## 2024-04-24 ENCOUNTER — ANESTHESIA (OUTPATIENT)
Dept: GASTROENTEROLOGY | Facility: HOSPITAL | Age: 64
End: 2024-04-24
Payer: OTHER GOVERNMENT

## 2024-04-24 VITALS
RESPIRATION RATE: 14 BRPM | SYSTOLIC BLOOD PRESSURE: 146 MMHG | OXYGEN SATURATION: 94 % | HEIGHT: 71 IN | DIASTOLIC BLOOD PRESSURE: 90 MMHG | HEART RATE: 85 BPM | BODY MASS INDEX: 42.42 KG/M2 | TEMPERATURE: 98 F | WEIGHT: 303 LBS

## 2024-04-24 DIAGNOSIS — R12 HEARTBURN: ICD-10-CM

## 2024-04-24 DIAGNOSIS — Z86.010 PERSONAL HISTORY OF COLONIC POLYPS: ICD-10-CM

## 2024-04-24 PROBLEM — Z86.0100 PERSONAL HISTORY OF COLONIC POLYPS: Status: ACTIVE | Noted: 2024-04-24

## 2024-04-24 LAB — GLUCOSE SERPL-MCNC: 130 MG/DL (ref 70–105)

## 2024-04-24 PROCEDURE — 27000284 HC CANNULA NASAL: Performed by: NURSE ANESTHETIST, CERTIFIED REGISTERED

## 2024-04-24 PROCEDURE — D9220A PRA ANESTHESIA: Mod: 33,,, | Performed by: NURSE ANESTHETIST, CERTIFIED REGISTERED

## 2024-04-24 PROCEDURE — 25000003 PHARM REV CODE 250: Performed by: NURSE ANESTHETIST, CERTIFIED REGISTERED

## 2024-04-24 PROCEDURE — 45385 COLONOSCOPY W/LESION REMOVAL: CPT | Mod: 33,,, | Performed by: INTERNAL MEDICINE

## 2024-04-24 PROCEDURE — 37000009 HC ANESTHESIA EA ADD 15 MINS

## 2024-04-24 PROCEDURE — 45385 COLONOSCOPY W/LESION REMOVAL: CPT | Performed by: INTERNAL MEDICINE

## 2024-04-24 PROCEDURE — 88305 TISSUE EXAM BY PATHOLOGIST: CPT | Mod: 26,,, | Performed by: PATHOLOGY

## 2024-04-24 PROCEDURE — 88305 TISSUE EXAM BY PATHOLOGIST: CPT | Mod: TC,SUR | Performed by: INTERNAL MEDICINE

## 2024-04-24 PROCEDURE — 82962 GLUCOSE BLOOD TEST: CPT

## 2024-04-24 PROCEDURE — 37000008 HC ANESTHESIA 1ST 15 MINUTES

## 2024-04-24 PROCEDURE — 63600175 PHARM REV CODE 636 W HCPCS: Performed by: NURSE ANESTHETIST, CERTIFIED REGISTERED

## 2024-04-24 RX ORDER — PROPOFOL 10 MG/ML
VIAL (ML) INTRAVENOUS
Status: DISCONTINUED | OUTPATIENT
Start: 2024-04-24 | End: 2024-04-24

## 2024-04-24 RX ORDER — PANTOPRAZOLE SODIUM 40 MG/1
40 TABLET, DELAYED RELEASE ORAL DAILY
Qty: 90 TABLET | Refills: 3 | Status: SHIPPED | OUTPATIENT
Start: 2024-04-24 | End: 2025-04-24

## 2024-04-24 RX ORDER — LIDOCAINE HYDROCHLORIDE 20 MG/ML
INJECTION, SOLUTION EPIDURAL; INFILTRATION; INTRACAUDAL; PERINEURAL
Status: DISCONTINUED | OUTPATIENT
Start: 2024-04-24 | End: 2024-04-24

## 2024-04-24 RX ORDER — SODIUM CHLORIDE 0.9 % (FLUSH) 0.9 %
10 SYRINGE (ML) INJECTION
Status: DISCONTINUED | OUTPATIENT
Start: 2024-04-24 | End: 2024-04-25 | Stop reason: HOSPADM

## 2024-04-24 RX ADMIN — PROPOFOL 40 MG: 10 INJECTION, EMULSION INTRAVENOUS at 12:04

## 2024-04-24 RX ADMIN — PROPOFOL 30 MG: 10 INJECTION, EMULSION INTRAVENOUS at 12:04

## 2024-04-24 RX ADMIN — SODIUM CHLORIDE: 9 INJECTION, SOLUTION INTRAVENOUS at 12:04

## 2024-04-24 RX ADMIN — LIDOCAINE HYDROCHLORIDE 60 MG: 20 INJECTION, SOLUTION INTRAVENOUS at 12:04

## 2024-04-24 RX ADMIN — PROPOFOL 100 MG: 10 INJECTION, EMULSION INTRAVENOUS at 12:04

## 2024-04-24 NOTE — H&P
Gastroenterology Pre-procedure H&P    History of Present Illness    Ant Webber is a 63 y.o. male that  has a past medical history of Diabetes, Diarrhea (08/03/2023), Hypertension, Lumbar post-laminectomy syndrome, Lumbosacral radiculopathy, Sleep apnea, and Transient ischemic attack (TIA) (2024).     Patient here for routine surveillance    Patient denies wt loss, abdominal pain, diarrhea, melena/hematochezia, change in stool caliber, no anticoagulants, FMHx of GI related malignancies.      Past Medical History:   Diagnosis Date    Diabetes     Diarrhea 08/03/2023    Hypertension     Lumbar post-laminectomy syndrome     Lumbosacral radiculopathy     Sleep apnea     Transient ischemic attack (TIA) 2024       Past Surgical History:   Procedure Laterality Date    BACK SURGERY  2013    Bilateral L4-L5 TFESI Bilateral 10-, 8-2-2017, 6-    Dr Simms    Bilateral L5-S1 TFESI Bilateral 05/15/2019    Dr Simms    CHOLECYSTOTOMY      EPIDURAL STEROID INJECTION N/A 2/13/2024    Procedure: Injection, Steroid, Epidural, L4/5;  Surgeon: Nani Simms MD;  Location: [facility];  Service: Pain Management;  Laterality: N/A;    HAND SURGERY  2003    HIP ARTHROSCOPY W/ LABRAL DEBRIDEMENT  2020    L5-S1 SUSAN  02/27/2019    Dr Simms    Left L4-L5 TFESI Left 8-, 7-, 6-8-2016    Dr Simms    SELECTIVE INJECTION OF ANESTHETIC AGENT AROUND LUMBAR SPINAL NERVE ROOT BY TRANSFORAMINAL APPROACH Left 9/16/2021    Procedure: Left L5-S1 TFESI;  Surgeon: Nani Simms MD;  Location: [facility];  Service: Pain Management;  Laterality: Left;  NO VAC   WILL BE TESTED    RECENTLY HAD COVID    TONSILLECTOMY      UMBILICAL HERNIA REPAIR         Family History   Problem Relation Name Age of Onset    Parkinsonism Mother      Hypertension Mother      Diabetes Father      Parkinsonism Father         Social History     Socioeconomic History    Marital status:    Occupational History    Occupation:  retail sales   Tobacco Use    Smoking status: Former     Current packs/day: 0.00     Types: Cigarettes     Quit date:      Years since quittin.3    Smokeless tobacco: Former     Types: Snuff     Quit date:    Substance and Sexual Activity    Alcohol use: Yes     Comment: occasionally    Drug use: Never       Current Outpatient Medications   Medication Sig Dispense Refill    amLODIPine (NORVASC) 2.5 MG tablet Take 2.5 mg by mouth once daily.      aspirin (ECOTRIN) 81 MG EC tablet 81 mg.      atorvastatin (LIPITOR) 20 MG tablet TAKE ONE-HALF TABLET BY MOUTH DAILY FOR CHOLESTEROL. STOP MEDICATION AND CALL PROVIDER FOR ANY UNEXPLAINED MUSCLE ACHES,PAIN OR WEAKNESS      azithromycin (Z-SREE) 250 MG tablet Take 1 tablet (250 mg total) by mouth once daily. Take first 2 tablets together, then 1 every day until finished. (Patient not taking: Reported on 3/4/2024) 6 tablet 0    cetirizine (ZYRTEC) 10 MG tablet Take 10 mg by mouth.      cyanocobalamin (VITAMIN B-12) 1000 MCG tablet 1,000 mcg.      diclofenac sodium (VOLTAREN) 1 % Gel APPLY 2 GRAMS TO AFFECTED AREA(S) 2 TIMES A DAY AS NEEDED FOR PAIN AND INFLAMMATION      metFORMIN (FORTAMET) 1,000 mg 24hr tablet Take 1,000 mg by mouth 2 (two) times daily with meals.      methylPREDNISolone (MEDROL DOSEPACK) 4 mg tablet use as directed 21 tablet 0    pantoprazole (PROTONIX) 40 MG tablet Take 1 tablet (40 mg total) by mouth once daily. 30 tablet 0    pregabalin (LYRICA) 100 MG capsule Take 1 capsule (100 mg total) by mouth 3 (three) times daily. 90 capsule 5    tiZANidine 4 mg Cap Take 4 mg by mouth every 8 (eight) hours as needed (Spasm). 90 capsule 5     No current facility-administered medications for this encounter.       Review of patient's allergies indicates:  No Known Allergies    Objective:  There were no vitals filed for this visit.     GEN: normal appearing, NAD, AAO x3  HENT: NCAT, anicteric, OP benign  CV: normal rate, regular rhythm  RESP: NABS,  symmetric rise, unlabored  ABD: soft, ND, no guarding or TTP  SKIN: warm and dry  NEURO: grossly afocal    Assessment and Plan:    Proceed with:    Colonoscopy for previous adenomatous polyp, screening for colon cancer    Manuel Evans MD  Gastroenterology

## 2024-04-24 NOTE — TRANSFER OF CARE
"Anesthesia Transfer of Care Note    Patient: Ant Webber    Procedure(s) Performed: Colonoscopy    Patient location: GI    Anesthesia Type: general    Transport from OR: Transported from OR on room air with adequate spontaneous ventilation. Continuous ECG monitoring in transport. Continuous SpO2 monitoring in transport    Post pain: adequate analgesia    Post assessment: no apparent anesthetic complications and tolerated procedure well    Post vital signs: stable    Level of consciousness: responds to stimulation and sedated    Nausea/Vomiting: no nausea/vomiting    Complications: none    Transfer of care protocol was followed      Last vitals: Visit Vitals  BP (!) 103/56 (BP Location: Right arm, Patient Position: Lying)   Pulse 87   Temp 36.8 °C (98.2 °F)   Resp 17   Ht 5' 11" (1.803 m)   Wt (!) 137.4 kg (303 lb)   SpO2 96%   BMI 42.26 kg/m²     "

## 2024-04-24 NOTE — ANESTHESIA POSTPROCEDURE EVALUATION
Anesthesia Post Evaluation    Patient: Ant Webber    Procedure(s) Performed: Colonoscopy    Final Anesthesia Type: general      Patient location during evaluation: GI PACU  Patient participation: Yes- Able to Participate  Level of consciousness: awake and alert  Post-procedure vital signs: reviewed and stable  Pain management: adequate  Airway patency: patent  СЕРГЕЙ mitigation strategies: Multimodal analgesia  PONV status at discharge: No PONV  Anesthetic complications: no      Cardiovascular status: stable  Respiratory status: unassisted  Hydration status: euvolemic  Follow-up not needed.              Vitals Value Taken Time   /97 04/24/24 1322   Temp  04/24/24 1403   Pulse 92 04/24/24 1323   Resp 17 04/24/24 1323   SpO2 95 % 04/24/24 1323   Vitals shown include unfiled device data.      Event Time   Out of Recovery 13:41:34         Pain/Ulises Score: Ulises Score: 9 (4/24/2024  1:02 PM)

## 2024-04-24 NOTE — ANESTHESIA PREPROCEDURE EVALUATION
2024  Ant Webber is a 63 y.o., male.    Past Medical History:   Diagnosis Date    Diabetes     Diarrhea 2023    Hypertension     Lumbar post-laminectomy syndrome     Lumbosacral radiculopathy     Sleep apnea     Transient ischemic attack (TIA)        Past Surgical History:   Procedure Laterality Date    BACK SURGERY      Bilateral L4-L5 TFESI Bilateral 10-, 2017, 2017    Dr Simms    Bilateral L5-S1 TFESI Bilateral 05/15/2019    Dr Simms    CHOLECYSTOTOMY      EPIDURAL STEROID INJECTION N/A 2024    Procedure: Injection, Steroid, Epidural, L4/5;  Surgeon: Nani Simms MD;  Location: [facility];  Service: Pain Management;  Laterality: N/A;    HAND SURGERY      HIP ARTHROSCOPY W/ LABRAL DEBRIDEMENT      L5-S1 SUSAN  2019    Dr Simms    Left L4-L5 TFESI Left 8-, 2016, 2016    Dr Simms    SELECTIVE INJECTION OF ANESTHETIC AGENT AROUND LUMBAR SPINAL NERVE ROOT BY TRANSFORAMINAL APPROACH Left 2021    Procedure: Left L5-S1 TFESI;  Surgeon: Nani Simms MD;  Location: [facility];  Service: Pain Management;  Laterality: Left;  NO VAC   WILL BE TESTED    RECENTLY HAD COVID    TONSILLECTOMY      UMBILICAL HERNIA REPAIR         Family History   Problem Relation Name Age of Onset    Parkinsonism Mother      Hypertension Mother      Diabetes Father      Parkinsonism Father         Social History     Socioeconomic History    Marital status:    Occupational History    Occupation: retail sales   Tobacco Use    Smoking status: Former     Current packs/day: 0.00     Types: Cigarettes     Quit date:      Years since quittin.3    Smokeless tobacco: Former     Types: Snuff     Quit date:    Substance and Sexual Activity    Alcohol use: Yes     Comment: occasionally    Drug use: Never       Current Outpatient  Medications   Medication Sig Dispense Refill    amLODIPine (NORVASC) 2.5 MG tablet Take 2.5 mg by mouth once daily.      aspirin (ECOTRIN) 81 MG EC tablet 81 mg.      atorvastatin (LIPITOR) 20 MG tablet TAKE ONE-HALF TABLET BY MOUTH DAILY FOR CHOLESTEROL. STOP MEDICATION AND CALL PROVIDER FOR ANY UNEXPLAINED MUSCLE ACHES,PAIN OR WEAKNESS      azithromycin (Z-SREE) 250 MG tablet Take 1 tablet (250 mg total) by mouth once daily. Take first 2 tablets together, then 1 every day until finished. (Patient not taking: Reported on 3/4/2024) 6 tablet 0    cetirizine (ZYRTEC) 10 MG tablet Take 10 mg by mouth.      cyanocobalamin (VITAMIN B-12) 1000 MCG tablet 1,000 mcg.      diclofenac sodium (VOLTAREN) 1 % Gel APPLY 2 GRAMS TO AFFECTED AREA(S) 2 TIMES A DAY AS NEEDED FOR PAIN AND INFLAMMATION      metFORMIN (FORTAMET) 1,000 mg 24hr tablet Take 1,000 mg by mouth 2 (two) times daily with meals.      methylPREDNISolone (MEDROL DOSEPACK) 4 mg tablet use as directed 21 tablet 0    pantoprazole (PROTONIX) 40 MG tablet Take 1 tablet (40 mg total) by mouth once daily. 30 tablet 0    pregabalin (LYRICA) 100 MG capsule Take 1 capsule (100 mg total) by mouth 3 (three) times daily. 90 capsule 5    tiZANidine 4 mg Cap Take 4 mg by mouth every 8 (eight) hours as needed (Spasm). 90 capsule 5     No current facility-administered medications for this encounter.       Review of patient's allergies indicates:  No Known Allergies     Pre-op Assessment    I have reviewed the Patient Summary Reports.     I have reviewed the Nursing Notes. I have reviewed the NPO Status.   I have reviewed the Medications.     Review of Systems  Anesthesia Hx:  No problems with previous Anesthesia                Cardiovascular:     Hypertension                                        Pulmonary:        Sleep Apnea           Education provided regarding risk of obstructive sleep apnea            Neurological:  TIA,  Neuromuscular Disease,                                    Endocrine:  Diabetes               Physical Exam    Airway:  Mallampati: II   Mouth Opening: Normal  TM Distance: Normal  Tongue: Normal  Neck ROM: Normal ROM        Anesthesia Plan  Type of Anesthesia, risks & benefits discussed:    Anesthesia Type: Gen Natural Airway, MAC  Intra-op Monitoring Plan: Standard ASA Monitors  Post Op Pain Control Plan: multimodal analgesia  Induction:  IV  Informed Consent: Informed consent signed with the Patient and all parties understand the risks and agree with anesthesia plan.  All questions answered. Patient consented to blood products? Yes  ASA Score: 3  Day of Surgery Review of History & Physical: H&P Update referred to the surgeon/provider.I have interviewed and examined the patient. I have reviewed the patient's H&P dated:     Ready For Surgery From Anesthesia Perspective.     .

## 2024-04-24 NOTE — DISCHARGE INSTRUCTIONS
Procedure Date  4/24/24     Impression  Overall Impression:   2 subcentimeter polyps in the sigmoid colon were removed with cold snare  Scattered diverticulosis of moderate severity  The ileocecal valve and cecum appeared normal.  (grade 2) hemorrhoids        Recommendation  Await pathology results   Repeat colonoscopy in 5 years         Outcome of procedure: successful Colonoscopy  Disposition: patient to recovery following procedure; discharge to home when appropriate parameters met  Provisions for follow up: please call my office for any unexpected symptoms like chest or abdominal pain or bleeding following your procedure.  Final Diagnosis: colon polyps     No driving today, no operating heavy machinery, no signing any legal documents until tomorrow.    Drink lots of fluids, resume regular diet.  Take your normal medications.     Please call our office for any nausea, vomiting or abdominal pain.

## 2024-04-25 LAB
ESTROGEN SERPL-MCNC: NORMAL PG/ML
INSULIN SERPL-ACNC: NORMAL U[IU]/ML
LAB AP GROSS DESCRIPTION: NORMAL
LAB AP LABORATORY NOTES: NORMAL
T3RU NFR SERPL: NORMAL %

## 2024-05-01 ENCOUNTER — OFFICE VISIT (OUTPATIENT)
Dept: PAIN MEDICINE | Facility: CLINIC | Age: 64
End: 2024-05-01
Payer: OTHER GOVERNMENT

## 2024-05-01 VITALS
DIASTOLIC BLOOD PRESSURE: 72 MMHG | HEIGHT: 71 IN | RESPIRATION RATE: 16 BRPM | SYSTOLIC BLOOD PRESSURE: 119 MMHG | BODY MASS INDEX: 41.86 KG/M2 | HEART RATE: 81 BPM | WEIGHT: 299 LBS

## 2024-05-01 DIAGNOSIS — M54.17 LUMBOSACRAL RADICULOPATHY: Primary | Chronic | ICD-10-CM

## 2024-05-01 DIAGNOSIS — M54.9 DORSALGIA, UNSPECIFIED: ICD-10-CM

## 2024-05-01 DIAGNOSIS — M54.6 THORACIC SPINE PAIN: Chronic | ICD-10-CM

## 2024-05-01 PROCEDURE — 96372 THER/PROPH/DIAG INJ SC/IM: CPT | Mod: PBBFAC | Performed by: PHYSICIAN ASSISTANT

## 2024-05-01 PROCEDURE — 99999PBSHW PR PBB SHADOW TECHNICAL ONLY FILED TO HB: Mod: PBBFAC,,,

## 2024-05-01 PROCEDURE — 99214 OFFICE O/P EST MOD 30 MIN: CPT | Mod: S$PBB,25,, | Performed by: PHYSICIAN ASSISTANT

## 2024-05-01 PROCEDURE — 99215 OFFICE O/P EST HI 40 MIN: CPT | Mod: PBBFAC | Performed by: PHYSICIAN ASSISTANT

## 2024-05-01 RX ORDER — TRIAMCINOLONE ACETONIDE 40 MG/ML
40 INJECTION, SUSPENSION INTRA-ARTICULAR; INTRAMUSCULAR
Status: COMPLETED | OUTPATIENT
Start: 2024-05-01 | End: 2024-05-01

## 2024-05-01 RX ADMIN — TRIAMCINOLONE ACETONIDE 40 MG: 40 INJECTION, SUSPENSION INTRA-ARTICULAR; INTRAMUSCULAR at 02:05

## 2024-05-01 NOTE — PROGRESS NOTES
Subjective:         Patient ID: [id number] Webber is a 63 y.o. male.    Chief Complaint: Low-back Pain, Foot Pain (both), and Leg Pain (left)        Pain  This is a chronic problem. The current episode started more than 1 year ago. The problem occurs daily. The problem has been waxing and waning. Associated symptoms include arthralgias. Pertinent negatives include no anorexia, chest pain, chills, coughing, diaphoresis, fever, sore throat, vertigo or vomiting.     Review of Systems   Constitutional:  Negative for activity change, appetite change, chills, diaphoresis, fever and unexpected weight change.   HENT:  Negative for drooling, ear discharge, ear pain, facial swelling, mouth dryness, nosebleeds, sore throat, trouble swallowing, voice change and goiter.    Eyes:  Negative for photophobia, pain, discharge, redness and visual disturbance.   Respiratory:  Negative for apnea, cough, choking, chest tightness, shortness of breath, wheezing and stridor.    Cardiovascular:  Negative for chest pain, palpitations and leg swelling.   Gastrointestinal:  Negative for abdominal distention, anorexia, diarrhea, rectal pain, vomiting and fecal incontinence.   Endocrine: Negative for cold intolerance, heat intolerance, polydipsia, polyphagia and polyuria.   Genitourinary:  Negative for bladder incontinence, dysuria, flank pain and frequency.   Musculoskeletal:  Positive for arthralgias, back pain and leg pain.   Integumentary:  Negative for color change and pallor.   Neurological:  Negative for dizziness, vertigo, seizures, syncope, facial asymmetry, speech difficulty, light-headedness, memory loss and coordination difficulties.   Hematological:  Negative for adenopathy. Does not bruise/bleed easily.   Psychiatric/Behavioral:  Negative for agitation, behavioral problems, confusion, decreased concentration, dysphoric mood, hallucinations and suicidal ideas. The patient is not nervous/anxious and is not hyperactive.            Past  "Medical History:   Diagnosis Date    Diabetes     Diarrhea 2023    Hypertension     Lumbar post-laminectomy syndrome     Lumbosacral radiculopathy     Sleep apnea     Transient ischemic attack (TIA)      Past Surgical History:   Procedure Laterality Date    BACK SURGERY  2013    Bilateral L4-L5 TFESI Bilateral 10-, 2017, 2017    Dr Simms    Bilateral L5-S1 TFESI Bilateral 05/15/2019    Dr Simms    CHOLECYSTOTOMY      EPIDURAL STEROID INJECTION N/A 2024    Procedure: Injection, Steroid, Epidural, L4/5;  Surgeon: Nani Simms MD;  Location: [facility];  Service: Pain Management;  Laterality: N/A;    HAND SURGERY      HIP ARTHROSCOPY W/ LABRAL DEBRIDEMENT      L5-S1 SUSAN  2019    Dr Simms    Left L4-L5 TFESI Left 8-, 2016, 2016    Dr Simms    SELECTIVE INJECTION OF ANESTHETIC AGENT AROUND LUMBAR SPINAL NERVE ROOT BY TRANSFORAMINAL APPROACH Left 2021    Procedure: Left L5-S1 TFESI;  Surgeon: Nani Simms MD;  Location: [facility];  Service: Pain Management;  Laterality: Left;  NO VAC   WILL BE TESTED    RECENTLY HAD COVID    TONSILLECTOMY      UMBILICAL HERNIA REPAIR       Social History     Socioeconomic History    Marital status:    Occupational History    Occupation: retail sales   Tobacco Use    Smoking status: Former     Current packs/day: 0.00     Types: Cigarettes     Quit date:      Years since quittin.3    Smokeless tobacco: Former     Types: Snuff     Quit date:    Substance and Sexual Activity    Alcohol use: Yes     Comment: occasionally    Drug use: Never     Family History   Problem Relation Name Age of Onset    Parkinsonism Mother      Hypertension Mother      Diabetes Father      Parkinsonism Father       Review of patient's allergies indicates:  No Known Allergies     Objective:  Vitals:    24 1352   BP: 119/72   Pulse: 81   Resp: 16   Weight: 135.6 kg (299 lb)   Height: 5' 11" (1.803 " m)   PainSc:   8           Physical Exam  Vitals and nursing note reviewed. Exam conducted with a chaperone present.   Constitutional:       General: He is awake. He is not in acute distress.     Appearance: Normal appearance. He is not ill-appearing or diaphoretic.   HENT:      Head: Normocephalic and atraumatic.      Nose: Nose normal.      Mouth/Throat:      Mouth: Mucous membranes are moist.      Pharynx: Oropharynx is clear.   Eyes:      Conjunctiva/sclera: Conjunctivae normal.      Pupils: Pupils are equal, round, and reactive to light.   Cardiovascular:      Rate and Rhythm: Normal rate.   Pulmonary:      Effort: Pulmonary effort is normal. No respiratory distress.   Abdominal:      Palpations: Abdomen is soft.      Tenderness: There is no guarding.   Musculoskeletal:         General: Normal range of motion.      Cervical back: Normal range of motion and neck supple. No rigidity.      Thoracic back: Tenderness present.      Lumbar back: Tenderness present.   Skin:     General: Skin is warm and dry.      Coloration: Skin is not jaundiced or pale.   Neurological:      General: No focal deficit present.      Mental Status: He is alert and oriented to person, place, and time. Mental status is at baseline.      Cranial Nerves: No cranial nerve deficit (II-XII).   Psychiatric:         Mood and Affect: Mood normal.         Behavior: Behavior normal. Behavior is cooperative.         Thought Content: Thought content normal.           Colonoscopy  Narrative: Table formatting from the original result was not included.  Procedure Date  4/24/24    Impression  Overall   Impression:    2 subcentimeter polyps in the sigmoid colon were removed with cold snare  Scattered diverticulosis of moderate severity  The ileocecal valve and cecum appeared normal.  (grade 2) hemorrhoids    Recommendation  Await pathology results   Repeat colonoscopy in 5 years     Outcome of procedure: successful Colonoscopy  Disposition: patient to  recovery following procedure; discharge to home   when appropriate parameters met  Provisions for follow up: please call my office for any unexpected   symptoms like chest or abdominal pain or bleeding following your   procedure.  Final Diagnosis: colon polyps     Indication  Personal history of colonic polyps; +FMH CRC in father    Providers  Elly Dawn, RN Registered Nurse   Joanna Marcus Technician   Manuel Evans MD Proceduralist   Yasir Paul CRNA CRNA Wagner, Kimberly M, RN Registered Nurse     Medications  Moderate sedation administered by anesthesia staff - See anesthesia   record.    Preprocedure  A history and physical has been performed, and patient medication   allergies have been reviewed. The patient's tolerance of previous   anesthesia has been reviewed. The risks and benefits of the procedure and   the sedation options and risks were discussed with the patient. All   questions were answered and informed consent obtained.    ASA Score: ASA 3 - Patient with moderate systemic disease with functional   limitations  Mallampati Airway Score: II (hard and soft palate, upper portion of   tonsils anduvula visible)    Details of the Procedure  The patient underwent monitored anesthesia care, which was administered by   an anesthesia professional. The patient's heart rate, blood pressure,   level of consciousness, respirations, oxygen, ECG and ETCO2 were monitored   throughout the procedure. A digital rectal exam was performed. A perianal   exam was performed. The scope was introduced through the anus and advanced   to the cecum. Retroflexion was not performed due to endocuff. The quality   of bowel preparation was evaluated using the [place] Bowel Preparation   Scale with scores of: right colon = 2, transverse colon = 2, left colon =   2. The total BBPS score was 6. Bowel prep was adequate. The patient's   estimated blood loss was minimal (<5 mL). The procedure was not difficult.   The  patient tolerated the procedure well. There were no apparent adverse   events.     Scope: Colonoscope  Scope Serial: [id number]    Events    Procedure Events   Event Event Time     Procedure Events   Event Event Time   ENDO SCOPE IN TIME 4/24/2024 12:41 PM   ENDO CECUM REACHED 4/24/2024 12:43 PM   ENDO SCOPE OUT TIME 4/24/2024 12:58 PM     CECAL WITHDRAWAL TIME: 14m 56s    Findings  Two sessile polyps measuring 5-9 mm in the sigmoid colon; performed cold   snare with complete en bloc removal and retrieved specimen  Multiple small, medium and large, scattered pancolonic diverticula of   moderate severity; no bleeding was identified  The ileocecal valve and cecum appeared normal.  Internal (grade 2) hemorrhoids; no bleeding was identified         Hospital Outpatient Visit on 04/24/2024   Component Date Value Ref Range Status    POC Glucose 04/24/2024 130 (H)  70 - 105 mg/dL Final    Case Report 04/24/2024    Final                    Value:Surgical Pathology                                Case: [id number]                                   Authorizing Provider:  Manuel Evans MD           Collected:           04/24/2024 12:55 PM          Ordering Location:     [facility] -         Received:            04/24/2024 02:14 PM                                 Endoscopy                                                                    Pathologist:           Robbie Ritchie MD                                                      Specimen:    Intestine Large, Sigmoid, a. sigmoid polyp x2                                              Final Diagnosis 04/24/2024    Final                    Value:This result contains rich text formatting which cannot be displayed here.    Gross Description 04/24/2024    Final                    Value:This result contains rich text formatting which cannot be displayed here.    Microscopic Description 04/24/2024    Final                    Value:This result contains rich text formatting which  cannot be displayed here.    Laboratory Notes 04/24/2024    Final                    Value:This result contains rich text formatting which cannot be displayed here.   Hospital Outpatient Visit on 02/22/2024   Component Date Value Ref Range Status    POC Glucose 02/22/2024 98  70 - 105 mg/dL Final    Case Report 02/22/2024    Final                    Value:Surgical Pathology                                Case: [id number]                                   Authorizing Provider:  Hipolito Crooks MD     Collected:           02/22/2024 01:36 PM          Ordering Location:     [facility] -         Received:            02/22/2024 02:46 PM                                 Endoscopy                                                                    Pathologist:           Robbie Ritchie MD                                                      Specimens:   A) - Stomach, A. Stomach bx                                                                         B) - Esophagus, B. Esophagus bx                                                            Final Diagnosis 02/22/2024    Final                    Value:This result contains rich text formatting which cannot be displayed here.    Comments 02/22/2024    Final                    Value:This result contains rich text formatting which cannot be displayed here.    Gross Description 02/22/2024    Final                    Value:This result contains rich text formatting which cannot be displayed here.    Microscopic Description 02/22/2024    Final                    Value:This result contains rich text formatting which cannot be displayed here.    Laboratory Notes 02/22/2024    Final                    Value:This result contains rich text formatting which cannot be displayed here.   Admission on 02/13/2024, Discharged on 02/13/2024   Component Date Value Ref Range Status    POC Glucose 02/13/2024 97  70 - 105 mg/dL Final   Lab Visit on 12/04/2023   Component Date Value Ref  Range Status    Sodium 12/04/2023 137  136 - 145 mmol/L Final    Potassium 12/04/2023 4.3  3.5 - 5.1 mmol/L Final    Chloride 12/04/2023 101  98 - 107 mmol/L Final    CO2 12/04/2023 31  21 - 32 mmol/L Final    Anion Gap 12/04/2023 9  7 - 16 mmol/L Final    Glucose 12/04/2023 129 (H)  74 - 106 mg/dL Final    BUN 12/04/2023 17  7 - 18 mg/dL Final    Creatinine 12/04/2023 1.02  0.70 - 1.30 mg/dL Final    BUN/Creatinine Ratio 12/04/2023 17  6 - 20 Final    Calcium 12/04/2023 10.0  8.5 - 10.1 mg/dL Final    eGFR 12/04/2023 83  >=60 mL/min/1.73m2 Final         Orders Placed This Encounter   Procedures    X-Ray Lumbar Spine 2 Or 3 Views     Standing Status:   Future     Standing Expiration Date:   8/1/2024     Order Specific Question:   May the Radiologist modify the order per protocol to meet the clinical needs of the patient?     Answer:   Yes     Order Specific Question:   Does the patient have a neck collar or brace on?     Answer:   No    MRI Lumbar Spine W WO Cont     Standing Status:   Future     Standing Expiration Date:   5/1/2025     Order Specific Question:   Does the patient have or ever had a pacemaker or a defibrillator (Note: Some facilities may not be able to schedule an MRI for patients with pacemakers and defibrillators. You should contact your local radiology dept to determine if this is the case.)?     Answer:   No     Order Specific Question:   Does the patient have an aneurysm or surgical clip, pump, nerve/brain stimulator, middle/inner ear prosthesis, or other metal implant or foreign object (bullet, shrapnel)? If they have a card related to their implant, ask them to bring it. Issues related to the implant may cause the MRI to be delayed.     Answer:   No     Order Specific Question:   Is the patient claustrophobic?     Answer:   No     Order Specific Question:   Will the patient require sedation?     Answer:   No     Order Specific Question:   Does the patient have any of the following conditions?  Diabetes, History of Renal Disease or Hypertension requiring medical therapy?     Answer:   No     Order Specific Question:   May the Radiologist modify the order per protocol to meet the clinical needs of the patient?     Answer:   Yes     Order Specific Question:   Is this part of a Research Study?     Answer:   No     Order Specific Question:   Recist criteria?     Answer:   No     Order Specific Question:   Will this service be billed to a Worker's Comp policy?     Answer:   No     Order Specific Question:   Does the patient have on a skin patch for medication with aluminized backing?     Answer:   No         Requested Prescriptions      No prescriptions requested or ordered in this encounter       Assessment:     1. Lumbosacral radiculopathy    2. Thoracic spine pain    3. Dorsalgia, unspecified             Lumbar myelogram [facility] in 19 2022 degenerative changes L3/4 bulging disc no central canal stenosis noted moderate left and mild right foraminal stenosis  L5/S1 mild right foraminal stenosis    X-ray lumbar spine [facility] February 2022 postoperative changes noted scoliosis noted mild, diffuse demineralization multiple level degenerative changes    Bone density study [facility] May 2022 osteopenia associated with least 2 times increased risk for fracture      Plan:    Not using narcotics from our office    History lumbar surgery 2013 [facility] [place]    Has implantable device for sleep apnea inspire  Dr. Marcial Britt has card in his position MRI conditional    Spine surgery recommended spinal cord stimulator, surgery last resort      He had lumbar L4/5 SUSAN # 1, February 13, 2024  He states he had 90% relief after procedure, his procedure did help improve his level function    Complaint left greater than right leg pain numbness and tingling radicular in nature getting worse with time    He is requesting Kenalog injection    After discussing options will proceed with     Kenalog 40 mg IM,  tolerated well    X-ray lumbar spine    MRI lumbar spine no contrast, lumbosacral radiculopathy  MRI for consideration procedure/surgery    I have given the patient medically directed home exercise program    Patient has tried NSAIDs and neuromodulators (neurontin, lyrica, elavil, or cymbalta)    Continue home exercise program as directed    Continue nonnarcotic medication as directed    Follow-up after MRI lumbar spine discuss options    Dr. Simms February 2025

## 2024-05-10 DIAGNOSIS — M25.552 LEFT HIP PAIN: Primary | ICD-10-CM

## 2024-05-10 DIAGNOSIS — M25.562 LEFT KNEE PAIN: ICD-10-CM

## 2024-05-10 DIAGNOSIS — M13.89 OTHER SPECIFIED ARTHRITIS, MULTIPLE SITES: ICD-10-CM

## 2024-05-14 DIAGNOSIS — M25.562 LEFT KNEE PAIN, UNSPECIFIED CHRONICITY: Primary | ICD-10-CM

## 2024-05-14 DIAGNOSIS — M25.552 LEFT HIP PAIN: ICD-10-CM

## 2024-05-15 ENCOUNTER — HOSPITAL ENCOUNTER (OUTPATIENT)
Dept: RADIOLOGY | Facility: HOSPITAL | Age: 64
Discharge: HOME | End: 2024-05-15
Attending: NURSE PRACTITIONER
Payer: OTHER GOVERNMENT

## 2024-05-15 ENCOUNTER — OFFICE VISIT (OUTPATIENT)
Dept: ORTHOPEDICS | Facility: CLINIC | Age: 64
End: 2024-05-15
Payer: OTHER GOVERNMENT

## 2024-05-15 VITALS — BODY MASS INDEX: 41.58 KG/M2 | WEIGHT: 297 LBS | HEIGHT: 71 IN

## 2024-05-15 DIAGNOSIS — M13.89 OTHER SPECIFIED ARTHRITIS, MULTIPLE SITES: ICD-10-CM

## 2024-05-15 DIAGNOSIS — M25.562 LEFT KNEE PAIN, UNSPECIFIED CHRONICITY: Primary | ICD-10-CM

## 2024-05-15 DIAGNOSIS — M17.12 PRIMARY OSTEOARTHRITIS OF LEFT KNEE: ICD-10-CM

## 2024-05-15 DIAGNOSIS — M25.552 LEFT HIP PAIN: ICD-10-CM

## 2024-05-15 DIAGNOSIS — M25.552 LEFT HIP PAIN: Primary | ICD-10-CM

## 2024-05-15 DIAGNOSIS — M25.562 LEFT KNEE PAIN, UNSPECIFIED CHRONICITY: ICD-10-CM

## 2024-05-15 PROCEDURE — 73564 X-RAY EXAM KNEE 4 OR MORE: CPT | Mod: TC,LT

## 2024-05-15 PROCEDURE — 20610 DRAIN/INJ JOINT/BURSA W/O US: CPT | Mod: PBBFAC | Performed by: NURSE PRACTITIONER

## 2024-05-15 PROCEDURE — 73502 X-RAY EXAM HIP UNI 2-3 VIEWS: CPT | Mod: 26,LT,, | Performed by: STUDENT IN AN ORGANIZED HEALTH CARE EDUCATION/TRAINING PROGRAM

## 2024-05-15 PROCEDURE — 99999PBSHW PR PBB SHADOW TECHNICAL ONLY FILED TO HB: Mod: PBBFAC,,,

## 2024-05-15 PROCEDURE — 73502 X-RAY EXAM HIP UNI 2-3 VIEWS: CPT | Mod: TC,LT

## 2024-05-15 PROCEDURE — 73564 X-RAY EXAM KNEE 4 OR MORE: CPT | Mod: 26,LT,, | Performed by: RADIOLOGY

## 2024-05-15 PROCEDURE — 99213 OFFICE O/P EST LOW 20 MIN: CPT | Mod: S$PBB,25,, | Performed by: NURSE PRACTITIONER

## 2024-05-15 PROCEDURE — 99213 OFFICE O/P EST LOW 20 MIN: CPT | Mod: PBBFAC,25 | Performed by: NURSE PRACTITIONER

## 2024-05-15 RX ORDER — TRIAMCINOLONE ACETONIDE 40 MG/ML
40 INJECTION, SUSPENSION INTRA-ARTICULAR; INTRAMUSCULAR
Status: DISCONTINUED | OUTPATIENT
Start: 2024-05-15 | End: 2024-05-15 | Stop reason: HOSPADM

## 2024-05-15 RX ORDER — MELOXICAM 7.5 MG/1
7.5 TABLET ORAL DAILY
Qty: 30 TABLET | Refills: 2 | Status: SHIPPED | OUTPATIENT
Start: 2024-05-15

## 2024-05-15 RX ADMIN — TRIAMCINOLONE ACETONIDE 40 MG: 40 INJECTION, SUSPENSION INTRA-ARTICULAR; INTRAMUSCULAR at 11:05

## 2024-05-15 NOTE — PROGRESS NOTES
ASSESSMENT:      ICD-10-CM ICD-9-CM   1. Other specified arthritis, multiple sites  M13.89 716.29       PLAN:     -Findings and treatment options were discussed with the patient  -All questions answered  Natural history and expected course discussed. Questions answered.  Educational materials distributed.  NSAIDs per medication orders.  Patient instructed to stop BC powder and ibuprofen.  We will start him on Mobic daily.  Left knee steroid injection today.  We will set up a left hip intra-articular injection in Radiology.  Return to clinic with Dr. Mcintosh in 6-8 weeks for recheck.  There are no Patient Instructions on file for this visit.    IMAGING:  X-Ray Knee Complete 4 or More Views Left    Result Date: 5/15/2024  EXAMINATION: XR KNEE COMP 4 OR MORE VIEWS LEFT CLINICAL HISTORY: Pain in left knee COMPARISON: None TECHNIQUE: Frontal, lateral, oblique, and sunrise views of the left knee. FINDINGS: Mild tricompartmental degenerative change of the knee.  No convincing acute fracture or dislocation demonstrated. No concerning radiopaque foreign body visualized.     As above. Point of Service: [facility] Electronically signed by: Valente Joseph Date:    05/15/2024 Time:    10:57    X-Ray Hip 2 or 3 views Left (with Pelvis when performed)    Result Date: 5/15/2024  EXAMINATION: XR HIP WITH PELVIS WHEN PERFORMED, 2 OR 3 VIEWS LEFT CLINICAL HISTORY: Pain in left hip TECHNIQUE: XR HIP WITH PELVIS WHEN PERFORMED, 2 OR 3 VIEWS LEFT COMPARISON: 2022 FINDINGS: No acute fracture or dislocation. Mild degenerative change of the hips No radiopaque foreign bodies.     No acute findings Mild degenerative change of the hips Electronically signed by: Unruly Wright Date:    05/15/2024 Time:    10:56    Colonoscopy    Result Date: 4/24/2024  Table formatting from the original result was not included. Procedure Date 4/24/24 Impression Overall      2 subcentimeter polyps in the sigmoid colon were removed with cold snare  Scattered diverticulosis of moderate severity The ileocecal valve and cecum appeared normal. (grade 2) hemorrhoids Recommendation Await pathology results Repeat colonoscopy in 5 years Outcome of     CC: Hip pain  63 y.o. Male who presents as a new patient to me for evaluation of left hip and knee pain . He had arthroscopy left hip by Dr Bains in 2020 and done well afterward.  Pain has been present for several months.  He denies any injury.  Reports his left hip pain started a few months ago.  Left knee pain started shortly after.  Most of his pain is in his groin area.  Pain in his knees on the medial and lateral side.  He has had no recent treatment.  Mechanism of injury:   Location of the pain: groin and lateral  Occupation: Retails  Mechanical symptoms, such as catching and popping of the hip are present.    Symptoms are worsened with activity.  Better with rest. Treatment thus far has included rest, activity modifications, and oral medications.    he has not  had formal physical therapy  he has not had previous advanced imaging such as MRI.   he has not  had previous hip injections.   he has  had previous hip or back surgery.   Here today to discuss diagnosis and treatment options.        Review of Systems  All other review of symptoms were reviewed and found to be noncontributory.     PAST MEDICAL HISTORY:   Past Medical History:   Diagnosis Date    Diabetes     Diarrhea 08/03/2023    Hypertension     Lumbar post-laminectomy syndrome     Lumbosacral radiculopathy     Sleep apnea     Transient ischemic attack (TIA) 2024       PAST SURGICAL HISTORY:   Past Surgical History:   Procedure Laterality Date    BACK SURGERY  2013    Bilateral L4-L5 TFESI Bilateral 10-, 8-2-2017, 6-    Dr Simms    Bilateral L5-S1 TFESI Bilateral 05/15/2019    Dr Simms    CHOLECYSTOTOMY      EPIDURAL STEROID INJECTION N/A 2/13/2024    Procedure: Injection, Steroid, Epidural, L4/5;  Surgeon: Nani Simms MD;  Location:  [facility];  Service: Pain Management;  Laterality: N/A;    HAND SURGERY      HIP ARTHROSCOPY W/ LABRAL DEBRIDEMENT      L5-S1 SUSAN  2019    Dr Simms    Left L4-L5 TFESI Left 8-, 2016, 2016    Dr Simms    SELECTIVE INJECTION OF ANESTHETIC AGENT AROUND LUMBAR SPINAL NERVE ROOT BY TRANSFORAMINAL APPROACH Left 2021    Procedure: Left L5-S1 TFESI;  Surgeon: Nani Simms MD;  Location: [facility];  Service: Pain Management;  Laterality: Left;  NO VAC   WILL BE TESTED    RECENTLY HAD COVID    TONSILLECTOMY      UMBILICAL HERNIA REPAIR         FAMILY HISTORY:   Family History   Problem Relation Name Age of Onset    Parkinsonism Mother      Hypertension Mother      Diabetes Father      Parkinsonism Father         SOCIAL HISTORY:   Social History     Socioeconomic History    Marital status:    Occupational History    Occupation: retail sales   Tobacco Use    Smoking status: Former     Current packs/day: 0.00     Types: Cigarettes     Quit date:      Years since quittin.3    Smokeless tobacco: Former     Types: Snuff     Quit date:    Substance and Sexual Activity    Alcohol use: Yes     Comment: occasionally    Drug use: Never       MEDICATIONS:     Current Outpatient Medications:     amLODIPine (NORVASC) 2.5 MG tablet, Take 2.5 mg by mouth once daily., Disp: , Rfl:     aspirin (ECOTRIN) 81 MG EC tablet, 81 mg., Disp: , Rfl:     cetirizine (ZYRTEC) 10 MG tablet, Take 10 mg by mouth., Disp: , Rfl:     cyanocobalamin (VITAMIN B-12) 1000 MCG tablet, 1,000 mcg., Disp: , Rfl:     diclofenac sodium (VOLTAREN) 1 % Gel, APPLY 2 GRAMS TO AFFECTED AREA(S) 2 TIMES A DAY AS NEEDED FOR PAIN AND INFLAMMATION, Disp: , Rfl:     pantoprazole (PROTONIX) 40 MG tablet, Take 1 tablet (40 mg total) by mouth once daily., Disp: 90 tablet, Rfl: 3    pregabalin (LYRICA) 100 MG capsule, Take 1 capsule (100 mg total) by mouth 3 (three) times daily., Disp: 90 capsule, Rfl: 5    " tiZANidine 4 mg Cap, Take 4 mg by mouth every 8 (eight) hours as needed (Spasm)., Disp: 90 capsule, Rfl: 5    atorvastatin (LIPITOR) 20 MG tablet, TAKE ONE-HALF TABLET BY MOUTH DAILY FOR CHOLESTEROL. STOP MEDICATION AND CALL PROVIDER FOR ANY UNEXPLAINED MUSCLE ACHES,PAIN OR WEAKNESS, Disp: , Rfl:     azithromycin (Z-SREE) 250 MG tablet, Take 1 tablet (250 mg total) by mouth once daily. Take first 2 tablets together, then 1 every day until finished. (Patient not taking: Reported on 3/4/2024), Disp: 6 tablet, Rfl: 0    metFORMIN (FORTAMET) 1,000 mg 24hr tablet, Take 1,000 mg by mouth 2 (two) times daily with meals., Disp: , Rfl:     methylPREDNISolone (MEDROL DOSEPACK) 4 mg tablet, use as directed (Patient not taking: Reported on 5/15/2024), Disp: 21 tablet, Rfl: 0    ALLERGIES:   Review of patient's allergies indicates:  No Known Allergies     PHYSICAL EXAMINATION:  Ht 5' 11" (1.803 m)   Wt 134.7 kg (297 lb)   BMI 41.42 kg/m²   General    Constitutional: He is oriented to person, place, and time. He appears well-nourished.   HENT:   Head: Normocephalic and atraumatic.   Eyes: Pupils are equal, round, and reactive to light.   Neck: Neck supple.   Cardiovascular:  Normal rate and regular rhythm.            Pulmonary/Chest: Effort normal. No respiratory distress.   Abdominal: There is no abdominal tenderness. There is no guarding.   Neurological: He is alert and oriented to person, place, and time. He has normal reflexes.   Psychiatric: He has a normal mood and affect. His behavior is normal. Judgment and thought content normal.           Right Knee Exam     Tests   Patella   Patellar Grind: positive    Left Knee Exam     Inspection   Swelling: present  Effusion: present    Tenderness   The patient tender to palpation of the medial joint line and lateral joint line.    Crepitus   The patient has crepitus of the patella.    Range of Motion   Extension:  normal   Flexion:  abnormal     Tests   Meniscus   Angela:  " Medial - positive   Stability   Lachman: normal (-1 to 2mm)   PCL-Posterior Drawer: normal (0 to 2mm)  Patella   Patellar Tracking: normal  Patellar Grind: positive    Other   Sensation: normalLeft Hip Exam     Inspection   Scars: present  Swelling: absent  Erythema: absent  Bruising: absent    Range of Motion   Extension:  normal   Flexion:  normal   External rotation:  normal   Internal rotation: normal     Tests   Pain w/ forced internal rotation (BETZY): present  Pain w/ forced external rotation (FADIR): present  Circumduction test: positive  Log Roll: negative    Other   Sensation: normal          Muscle Strength   Right Lower Extremity   Quadriceps:  5/5   Hamstrin/5   Left Lower Extremity   Quadriceps:  5/5   Hamstrin/5     Vascular Exam       Left Pulses  Dorsalis Pedis:      2+          No orders of the defined types were placed in this encounter.    Large Joint Aspiration/Injection: L supra patellar bursa    Date/Time: 5/15/2024 11:00 AM    Performed by: Yudy Gonzalez FNP  Authorized by: Yudy Gonzalez FNP    Consent Done?:  Yes (Verbal)  Indications:  Pain  Site marked: the procedure site was marked    Local anesthetic:  Bupivacaine 0.25% without epinephrine    Details:  Needle Size:  22 G  Location:  Knee  Site:  L supra patellar bursa  Medications:  40 mg triamcinolone acetonide 40 mg/mL  Patient tolerance:  Patient tolerated the procedure well with no immediate complications

## 2024-05-21 NOTE — PROGRESS NOTES
Subjective:         Patient ID: Ant Webber is a 63 y.o. male.    Chief Complaint: Follow-up (Review MRI )        Pain  This is a chronic problem. The current episode started more than 1 year ago. The problem occurs daily. The problem has been unchanged. Associated symptoms include arthralgias. Pertinent negatives include no anorexia, chest pain, chills, coughing, diaphoresis, fever, sore throat, vertigo or vomiting.     Review of Systems   Constitutional:  Negative for activity change, appetite change, chills, diaphoresis, fever and unexpected weight change.   HENT:  Negative for drooling, ear discharge, ear pain, facial swelling, mouth dryness, nosebleeds, sore throat, trouble swallowing, voice change and goiter.    Eyes:  Negative for photophobia, pain, discharge, redness and visual disturbance.   Respiratory:  Negative for apnea, cough, choking, chest tightness, shortness of breath, wheezing and stridor.    Cardiovascular:  Negative for chest pain, palpitations and leg swelling.   Gastrointestinal:  Negative for abdominal distention, anorexia, diarrhea, rectal pain, vomiting and fecal incontinence.   Endocrine: Negative for cold intolerance, heat intolerance, polydipsia, polyphagia and polyuria.   Genitourinary:  Negative for bladder incontinence, dysuria, flank pain and frequency.   Musculoskeletal:  Positive for arthralgias, back pain and leg pain.   Integumentary:  Negative for color change and pallor.   Neurological:  Negative for dizziness, vertigo, seizures, syncope, facial asymmetry, speech difficulty, light-headedness, memory loss and coordination difficulties.   Hematological:  Negative for adenopathy. Does not bruise/bleed easily.   Psychiatric/Behavioral:  Negative for agitation, behavioral problems, confusion, decreased concentration, dysphoric mood, hallucinations and suicidal ideas. The patient is not nervous/anxious and is not hyperactive.            Past Medical History:   Diagnosis Date     "Diabetes     Diarrhea 2023    Hypertension     Lumbar post-laminectomy syndrome     Lumbosacral radiculopathy     Sleep apnea     Transient ischemic attack (TIA)      Past Surgical History:   Procedure Laterality Date    BACK SURGERY  2013    Bilateral L4-L5 TFESI Bilateral 10-, 2017, 2017    Dr Simms    Bilateral L5-S1 TFESI Bilateral 05/15/2019    Dr Simms    CHOLECYSTOTOMY      EPIDURAL STEROID INJECTION N/A 2024    Procedure: Injection, Steroid, Epidural, L4/5;  Surgeon: Nani Simms MD;  Location: [facility];  Service: Pain Management;  Laterality: N/A;    HAND SURGERY      HIP ARTHROSCOPY W/ LABRAL DEBRIDEMENT      L5-S1 SUSAN  2019    Dr Simms    Left L4-L5 TFESI Left 8-, 2016, 2016    Dr Simms    SELECTIVE INJECTION OF ANESTHETIC AGENT AROUND LUMBAR SPINAL NERVE ROOT BY TRANSFORAMINAL APPROACH Left 2021    Procedure: Left L5-S1 TFESI;  Surgeon: Nani Simms MD;  Location: [facility];  Service: Pain Management;  Laterality: Left;  NO VAC   WILL BE TESTED    RECENTLY HAD COVID    TONSILLECTOMY      UMBILICAL HERNIA REPAIR       Social History     Socioeconomic History    Marital status:    Occupational History    Occupation: retail sales   Tobacco Use    Smoking status: Former     Current packs/day: 0.00     Types: Cigarettes     Quit date:      Years since quittin.    Smokeless tobacco: Former     Types: Snuff     Quit date:    Substance and Sexual Activity    Alcohol use: Yes     Comment: occasionally    Drug use: Never     Family History   Problem Relation Name Age of Onset    Parkinsonism Mother      Hypertension Mother      Diabetes Father      Parkinsonism Father       Review of patient's allergies indicates:  No Known Allergies     Objective:  Vitals:    24 0914   BP: 123/81   Pulse: 73   Resp: 16   Weight: 132.9 kg (293 lb)   Height: 5' 11" (1.803 m)   PainSc:   3           "   Physical Exam  Vitals and nursing note reviewed. Exam conducted with a chaperone present.   Constitutional:       General: He is awake. He is not in acute distress.     Appearance: Normal appearance. He is not ill-appearing or diaphoretic.   HENT:      Head: Normocephalic and atraumatic.      Nose: Nose normal.      Mouth/Throat:      Mouth: Mucous membranes are moist.      Pharynx: Oropharynx is clear.   Eyes:      Conjunctiva/sclera: Conjunctivae normal.      Pupils: Pupils are equal, round, and reactive to light.   Cardiovascular:      Rate and Rhythm: Normal rate.   Pulmonary:      Effort: Pulmonary effort is normal. No respiratory distress.   Abdominal:      Palpations: Abdomen is soft.      Tenderness: There is no guarding.   Musculoskeletal:         General: Normal range of motion.      Cervical back: Normal range of motion and neck supple. No rigidity.      Thoracic back: Tenderness present.      Lumbar back: Tenderness present.   Skin:     General: Skin is warm and dry.      Coloration: Skin is not jaundiced or pale.   Neurological:      General: No focal deficit present.      Mental Status: He is alert and oriented to person, place, and time. Mental status is at baseline.      Cranial Nerves: No cranial nerve deficit (II-XII).   Psychiatric:         Mood and Affect: Mood normal.         Behavior: Behavior normal. Behavior is cooperative.         Thought Content: Thought content normal.           MRI Lumbar Spine W WO Cont  Narrative: EXAMINATION:  MRI LUMBAR SPINE W WO CONTRAST    CLINICAL HISTORY:  Low back pain, symptoms persist with > 6wks conservative treatment; Dorsalgia, unspecified    TECHNIQUE:  Multiplanar, multisequence MR images were acquired from the thoracolumbar junction to the sacrum without and with the administration of contrast.  20 cc of MultiHance    COMPARISON:  4/25/22    FINDINGS:  Alignment: Normal.    Vertebrae: Modic type 2 endplate changes L3-L4 and L4-5.  No fracture.   Laminectomy change at L3-L4 and L4-5.    Discs: Moderate degenerative disc disease L3-L4 and L4-5.    Cord: Normal.    Degenerative findings:    L1-L2: Disc bulge and facet change results in mild bilateral neural foraminal narrowing.    L2-L3: Disc bulge and facet change results in mild bilateral neural foraminal narrowing.    L3-L4: Posterior disc change and facet change results in mild bilateral neural foraminal narrowing    L4-L5: Disc bulge, facet change and ligamentum flavum thickening results in mild-to-moderate right and mild left neural foraminal narrowing and is well as mild spinal canal narrowing.    L5-S1: Disc bulge and facet change results in mild spinal canal narrowing as well as mild bilateral neural foraminal narrowing    Paraspinal muscles & soft tissues: Atrophy of the paraspinal musculature at the L4-5 and L5-S1 level, possibly related to denervation.  Impression: Multilevel degenerative changes of the lumbar spine, as detailed.  No abnormal postcontrast enhancement.    Electronically signed by: Unruly Wright  Date:    05/29/2024  Time:    08:17         Hospital Outpatient Visit on 05/22/2024   Component Date Value Ref Range Status    POC Creatinine 05/29/2024 1.3  0.60 - 1.30 mg/dL Final   Hospital Outpatient Visit on 04/24/2024   Component Date Value Ref Range Status    POC Glucose 04/24/2024 130 (H)  70 - 105 mg/dL Final    Case Report 04/24/2024    Final                    Value:Surgical Pathology                                Case: [id number]                                   Authorizing Provider:  Manuel Evans MD           Collected:           04/24/2024 12:55 PM          Ordering Location:     [facility] -         Received:            04/24/2024 02:14 PM                                 Endoscopy                                                                    Pathologist:           Robbie Ritchie MD                                                      Specimen:     Intestine Large, Sigmoid, a. sigmoid polyp x2                                              Final Diagnosis 04/24/2024    Final                    Value:This result contains rich text formatting which cannot be displayed here.    Gross Description 04/24/2024    Final                    Value:This result contains rich text formatting which cannot be displayed here.    Microscopic Description 04/24/2024    Final                    Value:This result contains rich text formatting which cannot be displayed here.    Laboratory Notes 04/24/2024    Final                    Value:This result contains rich text formatting which cannot be displayed here.   Hospital Outpatient Visit on 02/22/2024   Component Date Value Ref Range Status    POC Glucose 02/22/2024 98  70 - 105 mg/dL Final    Case Report 02/22/2024    Final                    Value:Surgical Pathology                                Case: [id number]                                   Authorizing Provider:  Hipolito Crooks MD     Collected:           02/22/2024 01:36 PM          Ordering Location:     [facility] -         Received:            02/22/2024 02:46 PM                                 Endoscopy                                                                    Pathologist:           Robbie Ritchie MD                                                      Specimens:   A) - Stomach, A. Stomach bx                                                                         B) - Esophagus, B. Esophagus bx                                                            Final Diagnosis 02/22/2024    Final                    Value:This result contains rich text formatting which cannot be displayed here.    Comments 02/22/2024    Final                    Value:This result contains rich text formatting which cannot be displayed here.    Gross Description 02/22/2024    Final                    Value:This result contains rich text formatting which cannot be displayed  here.    Microscopic Description 02/22/2024    Final                    Value:This result contains rich text formatting which cannot be displayed here.    Laboratory Notes 02/22/2024    Final                    Value:This result contains rich text formatting which cannot be displayed here.   Admission on 02/13/2024, Discharged on 02/13/2024   Component Date Value Ref Range Status    POC Glucose 02/13/2024 97  70 - 105 mg/dL Final   Lab Visit on 12/04/2023   Component Date Value Ref Range Status    Sodium 12/04/2023 137  136 - 145 mmol/L Final    Potassium 12/04/2023 4.3  3.5 - 5.1 mmol/L Final    Chloride 12/04/2023 101  98 - 107 mmol/L Final    CO2 12/04/2023 31  21 - 32 mmol/L Final    Anion Gap 12/04/2023 9  7 - 16 mmol/L Final    Glucose 12/04/2023 129 (H)  74 - 106 mg/dL Final    BUN 12/04/2023 17  7 - 18 mg/dL Final    Creatinine 12/04/2023 1.02  0.70 - 1.30 mg/dL Final    BUN/Creatinine Ratio 12/04/2023 17  6 - 20 Final    Calcium 12/04/2023 10.0  8.5 - 10.1 mg/dL Final    eGFR 12/04/2023 83  >=60 mL/min/1.73m2 Final         No orders of the defined types were placed in this encounter.        Requested Prescriptions      No prescriptions requested or ordered in this encounter       Assessment:     1. Thoracic spine pain    2. Lumbosacral radiculopathy               X-ray left knee [facility] May 15, 2024  Mild tricompartmental degenerative changes    X-ray left hip [facility] May 15, 2024  No acute finding  Mild degenerative changes    Lumbar myelogram [facility] in 19 2022 degenerative changes L3/4 bulging disc no central canal stenosis noted moderate left and mild right foraminal stenosis  L5/S1 mild right foraminal stenosis    X-ray lumbar spine ARM February 2022 postoperative changes noted scoliosis noted mild, diffuse demineralization multiple level degenerative changes    Bone density study [facility] May 2022 osteopenia associated with least 2 times increased risk for  fracture        Plan:    Not using narcotics from our office    History lumbar surgery 2013 [facility] [place]    Has implantable device for sleep apnea inspire  Dr. [facility] has card in his position MRI conditional    Spine surgery recommended spinal cord stimulator, surgery last resort      He had lumbar L4/5 SUSAN # 1, February 13, 2024  He states he had 90% relief after procedure, his procedure did help improve his level function      Follow-up after MRI lumbar spine ordered May 1, 2024    Complaint left greater than right leg pain numbness and tingling radicular in nature getting worse with time    Left knee pain left lateral thigh pain following orthopedics had left knee and left trochanteric injection      X-ray lumbar spine he will complete today    MRI lumbar spine [facility] May 29, 2024  Multiple level degenerative changes  L4/5 mild-to-moderate neuroforaminal narrowing  L5/S1 mild bilateral neuroforaminal narrowing  Paraspinal muscles & soft tissues: Atrophy of the paraspinal musculature at the L4-5 and L5-S1 level, possibly related to denervation     After discussing options he would like to continue with conservative management     Considering repeating lumbar procedures if indicated      Continue home exercise program as directed    Continue nonnarcotic medication as directed    Follow-up as needed, patient request    Dr. Simms February 2025

## 2024-05-22 ENCOUNTER — HOSPITAL ENCOUNTER (OUTPATIENT)
Dept: RADIOLOGY | Facility: HOSPITAL | Age: 64
Discharge: HOME | End: 2024-05-22
Attending: ORTHOPAEDIC SURGERY
Payer: OTHER GOVERNMENT

## 2024-05-22 DIAGNOSIS — M25.552 LEFT HIP PAIN: ICD-10-CM

## 2024-05-22 PROCEDURE — 25500020 PHARM REV CODE 255: Performed by: STUDENT IN AN ORGANIZED HEALTH CARE EDUCATION/TRAINING PROGRAM

## 2024-05-22 PROCEDURE — 63600175 PHARM REV CODE 636 W HCPCS: Performed by: STUDENT IN AN ORGANIZED HEALTH CARE EDUCATION/TRAINING PROGRAM

## 2024-05-22 PROCEDURE — 77002 NEEDLE LOCALIZATION BY XRAY: CPT | Mod: TC

## 2024-05-22 RX ORDER — TRIAMCINOLONE ACETONIDE 40 MG/ML
40 INJECTION, SUSPENSION INTRA-ARTICULAR; INTRAMUSCULAR ONCE
Status: COMPLETED | OUTPATIENT
Start: 2024-05-22 | End: 2024-05-22

## 2024-05-22 RX ORDER — BUPIVACAINE HYDROCHLORIDE 5 MG/ML
4 INJECTION, SOLUTION PERINEURAL ONCE
Status: DISCONTINUED | OUTPATIENT
Start: 2024-05-22 | End: 2024-05-23 | Stop reason: HOSPADM

## 2024-05-22 RX ADMIN — IOPAMIDOL 4 ML: 612 INJECTION, SOLUTION INTRAVENOUS at 10:05

## 2024-05-22 RX ADMIN — TRIAMCINOLONE ACETONIDE 40 MG: 40 INJECTION, SUSPENSION INTRA-ARTICULAR; INTRAMUSCULAR at 11:05

## 2024-05-22 NOTE — PROCEDURES
Left Hip arthrogram injection performed Quentin PAINTER under the supervision of Dr. Wright. Patient was prepped with ChloraPrep and draped in a sterile fashion.  Formal time-out was called with all present in agreement 8 cc of 1% lidocaine infused.  The left hip was accessed with a 22 gauge needle with 4 cc of Isovue-300 infused.  40 mg of Kenalog and 4 cc of Bupivacaine then infused.  Needle withdrawn pressure held on the puncture site.  Bandage then placed.  Patient tolerated procedure well with no immediate complication.

## 2024-05-29 ENCOUNTER — HOSPITAL ENCOUNTER (OUTPATIENT)
Dept: RADIOLOGY | Facility: HOSPITAL | Age: 64
Discharge: HOME | End: 2024-05-29
Attending: PHYSICIAN ASSISTANT
Payer: OTHER GOVERNMENT

## 2024-05-29 ENCOUNTER — OFFICE VISIT (OUTPATIENT)
Dept: PAIN MEDICINE | Facility: CLINIC | Age: 64
End: 2024-05-29
Payer: OTHER GOVERNMENT

## 2024-05-29 VITALS
WEIGHT: 293 LBS | HEIGHT: 71 IN | RESPIRATION RATE: 16 BRPM | BODY MASS INDEX: 41.02 KG/M2 | SYSTOLIC BLOOD PRESSURE: 123 MMHG | DIASTOLIC BLOOD PRESSURE: 81 MMHG | HEART RATE: 73 BPM

## 2024-05-29 DIAGNOSIS — M54.9 DORSALGIA, UNSPECIFIED: ICD-10-CM

## 2024-05-29 DIAGNOSIS — M54.17 LUMBOSACRAL RADICULOPATHY: Chronic | ICD-10-CM

## 2024-05-29 DIAGNOSIS — M54.6 THORACIC SPINE PAIN: Chronic | ICD-10-CM

## 2024-05-29 LAB — CREAT SERPL-MCNC: 1.3 MG/DL (ref 0.6–1.3)

## 2024-05-29 PROCEDURE — 99214 OFFICE O/P EST MOD 30 MIN: CPT | Mod: S$PBB,,, | Performed by: PHYSICIAN ASSISTANT

## 2024-05-29 PROCEDURE — 82565 ASSAY OF CREATININE: CPT

## 2024-05-29 PROCEDURE — 99214 OFFICE O/P EST MOD 30 MIN: CPT | Mod: PBBFAC | Performed by: PHYSICIAN ASSISTANT

## 2024-05-29 PROCEDURE — 72100 X-RAY EXAM L-S SPINE 2/3 VWS: CPT | Mod: 26,,, | Performed by: STUDENT IN AN ORGANIZED HEALTH CARE EDUCATION/TRAINING PROGRAM

## 2024-05-29 PROCEDURE — 72100 X-RAY EXAM L-S SPINE 2/3 VWS: CPT | Mod: TC

## 2024-06-26 RX ORDER — PREGABALIN 100 MG/1
100 CAPSULE ORAL 3 TIMES DAILY
Qty: 90 CAPSULE | Refills: 5 | Status: CANCELLED | OUTPATIENT
Start: 2024-06-26

## 2024-06-26 NOTE — PROGRESS NOTES
Subjective:         Patient ID: Ant Webber is a 63 y.o. male.    Chief Complaint: Low-back Pain and Leg Pain        Pain  This is a chronic problem. The current episode started more than 1 year ago. The problem occurs daily. The problem has been waxing and waning. Associated symptoms include arthralgias. Pertinent negatives include no anorexia, chest pain, chills, coughing, diaphoresis, fever, sore throat, vertigo or vomiting.     Review of Systems   Constitutional:  Negative for activity change, appetite change, chills, diaphoresis, fever and unexpected weight change.   HENT:  Negative for drooling, ear discharge, ear pain, facial swelling, mouth dryness, nosebleeds, sore throat, trouble swallowing, voice change and goiter.    Eyes:  Negative for photophobia, pain, discharge, redness and visual disturbance.   Respiratory:  Negative for apnea, cough, choking, chest tightness, shortness of breath, wheezing and stridor.    Cardiovascular:  Negative for chest pain, palpitations and leg swelling.   Gastrointestinal:  Negative for abdominal distention, anorexia, diarrhea, rectal pain, vomiting and fecal incontinence.   Endocrine: Negative for cold intolerance, heat intolerance, polydipsia, polyphagia and polyuria.   Genitourinary:  Negative for bladder incontinence, dysuria, flank pain and frequency.   Musculoskeletal:  Positive for arthralgias, back pain and leg pain.   Integumentary:  Negative for color change and pallor.   Neurological:  Negative for dizziness, vertigo, seizures, syncope, facial asymmetry, speech difficulty, light-headedness, memory loss and coordination difficulties.   Hematological:  Negative for adenopathy. Does not bruise/bleed easily.   Psychiatric/Behavioral:  Negative for agitation, behavioral problems, confusion, decreased concentration, dysphoric mood, hallucinations and suicidal ideas. The patient is not nervous/anxious and is not hyperactive.            Past Medical History:  "  Diagnosis Date    Diabetes     Diarrhea 2023    Hypertension     Lumbar post-laminectomy syndrome     Lumbosacral radiculopathy     Sleep apnea     Transient ischemic attack (TIA)      Past Surgical History:   Procedure Laterality Date    BACK SURGERY  2013    Bilateral L4-L5 TFESI Bilateral 10-, 2017, 2017    Dr Simms    Bilateral L5-S1 TFESI Bilateral 05/15/2019    Dr Simms    CHOLECYSTOTOMY      EPIDURAL STEROID INJECTION N/A 2024    Procedure: Injection, Steroid, Epidural, L4/5;  Surgeon: Nani Simms MD;  Location: [facility];  Service: Pain Management;  Laterality: N/A;    HAND SURGERY      HIP ARTHROSCOPY W/ LABRAL DEBRIDEMENT      L5-S1 SUSAN  2019    Dr Simms    Left L4-L5 TFESI Left 8-, 2016, 2016    Dr Simms    SELECTIVE INJECTION OF ANESTHETIC AGENT AROUND LUMBAR SPINAL NERVE ROOT BY TRANSFORAMINAL APPROACH Left 2021    Procedure: Left L5-S1 TFESI;  Surgeon: Nani Simms MD;  Location: [facility];  Service: Pain Management;  Laterality: Left;  NO VAC   WILL BE TESTED    RECENTLY HAD COVID    TONSILLECTOMY      UMBILICAL HERNIA REPAIR       Social History     Socioeconomic History    Marital status:    Occupational History    Occupation: retail sales   Tobacco Use    Smoking status: Former     Current packs/day: 0.00     Types: Cigarettes     Quit date:      Years since quittin.5    Smokeless tobacco: Former     Types: Snuff     Quit date:    Substance and Sexual Activity    Alcohol use: Yes     Comment: occasionally    Drug use: Never     Family History   Problem Relation Name Age of Onset    Parkinsonism Mother      Hypertension Mother      Diabetes Father      Parkinsonism Father       Review of patient's allergies indicates:  No Known Allergies     Objective:  Vitals:    24 0759   BP: (!) 146/93   Pulse: 76   Resp: 18   Weight: 135.6 kg (299 lb)   Height: 5' 11" (1.803 m)   PainSc: "   7               Physical Exam  Vitals and nursing note reviewed. Exam conducted with a chaperone present.   Constitutional:       General: He is awake. He is not in acute distress.     Appearance: Normal appearance. He is not ill-appearing or diaphoretic.   HENT:      Head: Normocephalic and atraumatic.      Nose: Nose normal.      Mouth/Throat:      Mouth: Mucous membranes are moist.      Pharynx: Oropharynx is clear.   Eyes:      Conjunctiva/sclera: Conjunctivae normal.      Pupils: Pupils are equal, round, and reactive to light.   Cardiovascular:      Rate and Rhythm: Normal rate.   Pulmonary:      Effort: Pulmonary effort is normal. No respiratory distress.   Abdominal:      Palpations: Abdomen is soft.      Tenderness: There is no guarding.   Musculoskeletal:         General: Normal range of motion.      Cervical back: Normal range of motion and neck supple. No rigidity.      Thoracic back: Tenderness present.      Lumbar back: Tenderness present.   Skin:     General: Skin is warm and dry.      Coloration: Skin is not jaundiced or pale.   Neurological:      General: No focal deficit present.      Mental Status: He is alert and oriented to person, place, and time. Mental status is at baseline.      Cranial Nerves: No cranial nerve deficit (II-XII).   Psychiatric:         Mood and Affect: Mood normal.         Behavior: Behavior normal. Behavior is cooperative.         Thought Content: Thought content normal.           X-Ray Lumbar Spine 2 Or 3 Views  Narrative: EXAMINATION:  XR LUMBAR SPINE 2 OR 3 VIEWS    CLINICAL HISTORY:  Low back pain, symptoms persist with > 6wks conservative treatment;.  Dorsalgia, unspecified    TECHNIQUE:  XR LUMBAR SPINE 2 OR 3 VIEWS    COMPARISON:  5/29/24    FINDINGS:  No acute fracture or dislocation.  Mild endplate change.  Moderate degenerative disc disease L3-L4, L4-5 and L5-S1.  Multilevel facet change.  Multilevel neural foraminal narrowing, most significant at L3-L4, L4-5 and  L5-S1.  Impression: As above    Electronically signed by: Unruly Wright  Date:    05/29/2024  Time:    10:10  MRI Lumbar Spine W WO Cont  Narrative: EXAMINATION:  MRI LUMBAR SPINE W WO CONTRAST    CLINICAL HISTORY:  Low back pain, symptoms persist with > 6wks conservative treatment; Dorsalgia, unspecified    TECHNIQUE:  Multiplanar, multisequence MR images were acquired from the thoracolumbar junction to the sacrum without and with the administration of contrast.  20 cc of MultiHance    COMPARISON:  4/25/22    FINDINGS:  Alignment: Normal.    Vertebrae: Modic type 2 endplate changes L3-L4 and L4-5.  No fracture.  Laminectomy change at L3-L4 and L4-5.    Discs: Moderate degenerative disc disease L3-L4 and L4-5.    Cord: Normal.    Degenerative findings:    L1-L2: Disc bulge and facet change results in mild bilateral neural foraminal narrowing.    L2-L3: Disc bulge and facet change results in mild bilateral neural foraminal narrowing.    L3-L4: Posterior disc change and facet change results in mild bilateral neural foraminal narrowing    L4-L5: Disc bulge, facet change and ligamentum flavum thickening results in mild-to-moderate right and mild left neural foraminal narrowing and is well as mild spinal canal narrowing.    L5-S1: Disc bulge and facet change results in mild spinal canal narrowing as well as mild bilateral neural foraminal narrowing    Paraspinal muscles & soft tissues: Atrophy of the paraspinal musculature at the L4-5 and L5-S1 level, possibly related to denervation.  Impression: Multilevel degenerative changes of the lumbar spine, as detailed.  No abnormal postcontrast enhancement.    Electronically signed by: Unruly Wright  Date:    05/29/2024  Time:    08:17         Hospital Outpatient Visit on 05/22/2024   Component Date Value Ref Range Status    POC Creatinine 05/29/2024 1.3  0.60 - 1.30 mg/dL Final   Hospital Outpatient Visit on 04/24/2024   Component Date Value Ref Range Status    POC  Glucose 04/24/2024 130 (H)  70 - 105 mg/dL Final    Case Report 04/24/2024    Final                    Value:Surgical Pathology                                Case: [id number]                                   Authorizing Provider:  Manuel Evans MD           Collected:           04/24/2024 12:55 PM          Ordering Location:     [facility] -         Received:            04/24/2024 02:14 PM                                 Endoscopy                                                                    Pathologist:           Robbie Ritchie MD                                                      Specimen:    Intestine Large, Sigmoid, a. sigmoid polyp x2                                              Final Diagnosis 04/24/2024    Final                    Value:This result contains rich text formatting which cannot be displayed here.    Gross Description 04/24/2024    Final                    Value:This result contains rich text formatting which cannot be displayed here.    Microscopic Description 04/24/2024    Final                    Value:This result contains rich text formatting which cannot be displayed here.    Laboratory Notes 04/24/2024    Final                    Value:This result contains rich text formatting which cannot be displayed here.   Hospital Outpatient Visit on 02/22/2024   Component Date Value Ref Range Status    POC Glucose 02/22/2024 98  70 - 105 mg/dL Final    Case Report 02/22/2024    Final                    Value:Surgical Pathology                                Case: [id number]                                   Authorizing Provider:  Hipolito Crooks MD     Collected:           02/22/2024 01:36 PM          Ordering Location:     [facility] -         Received:            02/22/2024 02:46 PM                                 Endoscopy                                                                    Pathologist:           Robbie Ritchie MD                                                       Specimens:   A) - Stomach, A. Stomach bx                                                                         B) - Esophagus, B. Esophagus bx                                                            Final Diagnosis 02/22/2024    Final                    Value:This result contains rich text formatting which cannot be displayed here.    Comments 02/22/2024    Final                    Value:This result contains rich text formatting which cannot be displayed here.    Gross Description 02/22/2024    Final                    Value:This result contains rich text formatting which cannot be displayed here.    Microscopic Description 02/22/2024    Final                    Value:This result contains rich text formatting which cannot be displayed here.    Laboratory Notes 02/22/2024    Final                    Value:This result contains rich text formatting which cannot be displayed here.   Admission on 02/13/2024, Discharged on 02/13/2024   Component Date Value Ref Range Status    POC Glucose 02/13/2024 97  70 - 105 mg/dL Final         No orders of the defined types were placed in this encounter.        Requested Prescriptions     Signed Prescriptions Disp Refills    methylPREDNISolone (MEDROL DOSEPACK) 4 mg tablet 21 tablet 0     Sig: use as directed       Assessment:     1. Lumbosacral radiculopathy    2. Thoracic spine pain                 X-ray left knee [facility] May 15, 2024  Mild tricompartmental degenerative changes    X-ray left hip [facility] May 15, 2024  No acute finding  Mild degenerative changes    Lumbar myelogram [facility] in 19 2022 degenerative changes L3/4 bulging disc no central canal stenosis noted moderate left and mild right foraminal stenosis  L5/S1 mild right foraminal stenosis    X-ray lumbar spine [facility] May 29, 2024  No Acute fracture  Multiple levels moderate degenerative changes      Bone density study [facility] May 2022 osteopenia associated with least 2  times increased risk for fracture      MRI lumbar spine [facility] May 29, 2024  Multiple level degenerative changes  L4/5 mild-to-moderate neuroforaminal narrowing  L5/S1 mild bilateral neuroforaminal narrowing  Paraspinal muscles & soft tissues: Atrophy of the paraspinal musculature at the L4-5 and L5-S1 level, possibly related to denervation           Plan:    Not using narcotics from our office    History lumbar surgery 2013 [facility] [place]    Has implantable device for sleep apnea inspire  Dr. Ceja [facility], has card, MRI conditional    Spine surgery recommended spinal cord stimulator, lumbar surgery last resort      He had lumbar L4/5 SUSAN # 1, February 13, 2024  He states he had 90% relief after procedure, his procedure did help improve his level function      Here today requesting Toradol injection Medrol Dosepak     He is complaint of back and joint pain some radicular lower extremity discomfort     He is considering repeating lumbar procedure    Will be going out of town tomorrow to [place] to visit his daughter    Continue home exercise program as directed    Continue nonnarcotic medication as directed    Follow-up as needed, patient request    Dr. Simms February 2025

## 2024-07-01 ENCOUNTER — OFFICE VISIT (OUTPATIENT)
Dept: PAIN MEDICINE | Facility: CLINIC | Age: 64
End: 2024-07-01
Payer: OTHER GOVERNMENT

## 2024-07-01 VITALS
WEIGHT: 299 LBS | HEART RATE: 76 BPM | SYSTOLIC BLOOD PRESSURE: 146 MMHG | RESPIRATION RATE: 18 BRPM | DIASTOLIC BLOOD PRESSURE: 93 MMHG | BODY MASS INDEX: 41.86 KG/M2 | HEIGHT: 71 IN

## 2024-07-01 DIAGNOSIS — M54.17 LUMBOSACRAL RADICULOPATHY: Primary | Chronic | ICD-10-CM

## 2024-07-01 DIAGNOSIS — M54.6 THORACIC SPINE PAIN: Chronic | ICD-10-CM

## 2024-07-01 PROCEDURE — 99999PBSHW PR PBB SHADOW TECHNICAL ONLY FILED TO HB: Mod: PBBFAC,,,

## 2024-07-01 PROCEDURE — 96372 THER/PROPH/DIAG INJ SC/IM: CPT | Mod: PBBFAC | Performed by: PHYSICIAN ASSISTANT

## 2024-07-01 PROCEDURE — 99214 OFFICE O/P EST MOD 30 MIN: CPT | Mod: PBBFAC | Performed by: PHYSICIAN ASSISTANT

## 2024-07-01 PROCEDURE — 99999 PR PBB SHADOW E&M-EST. PATIENT-LVL IV: CPT | Mod: PBBFAC,,, | Performed by: PHYSICIAN ASSISTANT

## 2024-07-01 PROCEDURE — 99214 OFFICE O/P EST MOD 30 MIN: CPT | Mod: S$PBB,25,, | Performed by: PHYSICIAN ASSISTANT

## 2024-07-01 RX ORDER — METHYLPREDNISOLONE 4 MG/1
TABLET ORAL
Qty: 21 TABLET | Refills: 0 | Status: SHIPPED | OUTPATIENT
Start: 2024-07-01

## 2024-07-01 RX ORDER — KETOROLAC TROMETHAMINE 30 MG/ML
60 INJECTION, SOLUTION INTRAMUSCULAR; INTRAVENOUS
Status: COMPLETED | OUTPATIENT
Start: 2024-07-01 | End: 2024-07-01

## 2024-07-01 RX ADMIN — KETOROLAC TROMETHAMINE 60 MG: 30 INJECTION, SOLUTION INTRAMUSCULAR at 08:07

## 2024-07-30 ENCOUNTER — PATIENT MESSAGE (OUTPATIENT)
Dept: ORTHOPEDICS | Facility: CLINIC | Age: 64
End: 2024-07-30
Payer: OTHER GOVERNMENT

## 2024-08-06 ENCOUNTER — OFFICE VISIT (OUTPATIENT)
Dept: ORTHOPEDICS | Facility: CLINIC | Age: 64
End: 2024-08-06
Payer: OTHER GOVERNMENT

## 2024-08-06 ENCOUNTER — HOSPITAL ENCOUNTER (OUTPATIENT)
Dept: RADIOLOGY | Facility: HOSPITAL | Age: 64
Discharge: HOME | End: 2024-08-06
Attending: ORTHOPAEDIC SURGERY
Payer: OTHER GOVERNMENT

## 2024-08-06 DIAGNOSIS — M25.552 LEFT HIP PAIN: Primary | ICD-10-CM

## 2024-08-06 DIAGNOSIS — M25.551 BILATERAL HIP PAIN: Primary | ICD-10-CM

## 2024-08-06 DIAGNOSIS — M25.551 RIGHT HIP PAIN: ICD-10-CM

## 2024-08-06 DIAGNOSIS — M25.551 BILATERAL HIP PAIN: ICD-10-CM

## 2024-08-06 DIAGNOSIS — M25.552 BILATERAL HIP PAIN: ICD-10-CM

## 2024-08-06 DIAGNOSIS — M25.552 BILATERAL HIP PAIN: Primary | ICD-10-CM

## 2024-08-06 PROCEDURE — 73522 X-RAY EXAM HIPS BI 3-4 VIEWS: CPT | Mod: TC

## 2024-08-06 PROCEDURE — 99999 PR PBB SHADOW E&M-EST. PATIENT-LVL II: CPT | Mod: PBBFAC,,, | Performed by: ORTHOPAEDIC SURGERY

## 2024-08-06 PROCEDURE — 99212 OFFICE O/P EST SF 10 MIN: CPT | Mod: PBBFAC,25 | Performed by: ORTHOPAEDIC SURGERY

## 2024-08-06 PROCEDURE — 99214 OFFICE O/P EST MOD 30 MIN: CPT | Mod: S$PBB,,, | Performed by: ORTHOPAEDIC SURGERY

## 2024-08-09 RX ORDER — MELOXICAM 7.5 MG/1
7.5 TABLET ORAL DAILY
Qty: 30 TABLET | Refills: 2 | Status: SHIPPED | OUTPATIENT
Start: 2024-08-09

## 2024-09-04 ENCOUNTER — PATIENT MESSAGE (OUTPATIENT)
Dept: ORTHOPEDICS | Facility: CLINIC | Age: 64
End: 2024-09-04
Payer: OTHER GOVERNMENT

## 2024-09-04 DIAGNOSIS — M54.6 THORACIC SPINE PAIN: Chronic | ICD-10-CM

## 2024-09-04 DIAGNOSIS — M54.17 LUMBOSACRAL RADICULOPATHY: Chronic | ICD-10-CM

## 2024-09-04 RX ORDER — PREGABALIN 100 MG/1
100 CAPSULE ORAL 3 TIMES DAILY
Qty: 90 CAPSULE | Refills: 5 | Status: SHIPPED | OUTPATIENT
Start: 2024-09-04

## 2024-09-04 NOTE — TELEPHONE ENCOUNTER
----- Message from Mesha Ching sent at 9/4/2024 10:22 AM CDT -----  pregabalin (LYRICA) 100 MG capsule   Sig: Take 1 capsule (100 mg total) by mouth 3 (three) times daily.  Route: Oral    [facility] [place], [place] - [address]  [address] [facility] [address]  Phone: [phone] Fax: [phone]  Hours: Not open 24 hours

## 2024-09-06 ENCOUNTER — OFFICE VISIT (OUTPATIENT)
Dept: ORTHOPEDICS | Facility: CLINIC | Age: 64
End: 2024-09-06
Payer: OTHER GOVERNMENT

## 2024-09-06 DIAGNOSIS — M16.12 PRIMARY OSTEOARTHRITIS OF LEFT HIP: ICD-10-CM

## 2024-09-06 DIAGNOSIS — M16.11 OSTEOARTHRITIS OF RIGHT HIP, UNSPECIFIED OSTEOARTHRITIS TYPE: Primary | ICD-10-CM

## 2024-09-06 PROCEDURE — 99214 OFFICE O/P EST MOD 30 MIN: CPT | Mod: S$PBB,,, | Performed by: ORTHOPAEDIC SURGERY

## 2024-09-06 PROCEDURE — 99999 PR PBB SHADOW E&M-EST. PATIENT-LVL III: CPT | Mod: PBBFAC,,, | Performed by: ORTHOPAEDIC SURGERY

## 2024-09-06 PROCEDURE — 99213 OFFICE O/P EST LOW 20 MIN: CPT | Mod: PBBFAC | Performed by: ORTHOPAEDIC SURGERY

## 2024-09-06 NOTE — H&P (VIEW-ONLY)
CC:  Hip pain  63 y.o. Male returns to clinic for a follow up visit regarding     ICD-10-CM ICD-9-CM   1. Osteoarthritis of right hip, unspecified osteoarthritis type  M16.11 715.95   2. Primary osteoarthritis of left hip  M16.12 715.15       Patient is here today to review the results of his MRI and discuss treatment options moving forward.       PAST MEDICAL HISTORY:   Past Medical History:   Diagnosis Date    Diabetes     Diarrhea 08/03/2023    Hypertension     Lumbar post-laminectomy syndrome     Lumbosacral radiculopathy     Sleep apnea     Transient ischemic attack (TIA) 2024       PAST SURGICAL HISTORY:   Past Surgical History:   Procedure Laterality Date    BACK SURGERY  2013    Bilateral L4-L5 TFESI Bilateral 10-, 8-2-2017, 6-    Dr Simms    Bilateral L5-S1 TFESI Bilateral 05/15/2019    Dr Simms    CHOLECYSTOTOMY      EPIDURAL STEROID INJECTION N/A 2/13/2024    Procedure: Injection, Steroid, Epidural, L4/5;  Surgeon: Nani Simms MD;  Location: [facility];  Service: Pain Management;  Laterality: N/A;    HAND SURGERY  2003    HIP ARTHROSCOPY W/ LABRAL DEBRIDEMENT  2020    L5-S1 SUSAN  02/27/2019    Dr Simms    Left L4-L5 TFESI Left 8-, 7-, 6-8-2016    Dr Simms    SELECTIVE INJECTION OF ANESTHETIC AGENT AROUND LUMBAR SPINAL NERVE ROOT BY TRANSFORAMINAL APPROACH Left 9/16/2021    Procedure: Left L5-S1 TFESI;  Surgeon: Nani Simms MD;  Location: [facility];  Service: Pain Management;  Laterality: Left;  NO VAC   WILL BE TESTED    RECENTLY HAD COVID    TONSILLECTOMY      UMBILICAL HERNIA REPAIR           PHYSICAL EXAMINATION:  General    Nursing note and vitals reviewed.  Constitutional: He is oriented to person, place, and time. He appears well-developed and well-nourished.   HENT:   Head: Normocephalic and atraumatic.   Nose: Nose normal.   Eyes: EOM are normal. Pupils are equal, round, and reactive to light.   Neck: Neck supple.   Cardiovascular:   Normal rate and intact distal pulses.            Pulmonary/Chest: Effort normal. No respiratory distress. He exhibits no tenderness.   Abdominal: Soft. He exhibits no distension. There is no abdominal tenderness.   Neurological: He is alert and oriented to person, place, and time. He has normal reflexes. He displays normal reflexes. No cranial nerve deficit. He exhibits normal muscle tone. Coordination normal.   Psychiatric: He has a normal mood and affect. His behavior is normal. Judgment and thought content normal.     General Musculoskeletal Exam   Pelvic Obliquity: none        Right Hip Exam   Right hip exam is normal.     Inspection   Deformity of hip.    Range of Motion   Extension:  abnormal   Flexion:  abnormal   External rotation:  abnormal   Internal rotation:  abnormal     Tests   Pain w/ forced internal rotation (BETZY): present  Pain w/ forced external rotation (FADIR): present  Circumduction test: positive  Log Roll: positive    Other   Sensation: normal  Left Hip Exam     Tenderness   The patient tender to palpation of the trochanteric bursa.    Range of Motion   Extension:  normal   Flexion:  normal   External rotation:  normal   Internal rotation: normal     Tests   Pain w/ forced internal rotation (BETZY): present  Pain w/ forced external rotation (FADIR): present  Circumduction test: positive  Log Roll: positive    Other   Sensation: normal      Back (L-Spine & T-Spine) / Neck (C-Spine) Exam   Back exam is normal.    Back (L-Spine & T-Spine) Range of Motion   The patient has abnormal back ROM.      Vascular Exam     Right Pulses    Posterior Tibial:      2+        Left Pulses  Dorsalis Pedis:      2+  Posterior Tibial:      2+            IMAGING:  MRI Hip Without Contrast Left    Result Date: 8/30/2024  EXAMINATION: MRI HIP WITHOUT CONTRAST LEFT CLINICAL HISTORY: LEFT HIP PAIN; Pain in left hip TECHNIQUE: Sagittal, axial and coronal imaging of the left hip was performed using T1, T2, STIR and  gradient sequence. COMPARISON: None available FINDINGS: Joint alignment is normal. No joint effusion is seen.  Mild left hip degenerative change present. No other abnormal osseous marrow signal is present. Muscles, tendons and ligaments are intact without signal abnormality. No abnormal fluid collections or other signal abnormality is seen in the soft tissues. No other abnormalities are demonstrated.     Mild hip osteoarthrosis. Electronically signed by: Timur Sandoval Date:    08/30/2024 Time:    07:49    MRI Hip Without Contrast Right    Result Date: 8/30/2024  EXAMINATION: MRI HIP WITHOUT CONTRAST RIGHT CLINICAL HISTORY: RIGHT HIP PAIN; Pain in right hip TECHNIQUE: Sagittal, axial and coronal imaging of the right hip was performed using T1, T2, STIR and gradient sequence. COMPARISON: None available FINDINGS: Joint alignment is normal. No joint effusion is seen.  Mild hip degenerative change present. No other abnormal osseous marrow signal is present. Muscles, tendons and ligaments are intact without signal abnormality. No abnormal fluid collections or other signal abnormality is seen in the soft tissues. No other abnormalities are demonstrated.     Mild hip osteoarthrosis. Electronically signed by: Timur Sandoval Date:    08/30/2024 Time:    07:47       Per my read he has significant right hip osteoarthritis and degenerative labral tear on the right hip with a very large cam deformity in significant joint space narrowing right hip    ASSESSMENT:      ICD-10-CM ICD-9-CM   1. Osteoarthritis of right hip, unspecified osteoarthritis type  M16.11 715.95   2. Primary osteoarthritis of left hip  M16.12 715.15       PLAN:     -Findings and treatment options were discussed with the patient  -All questions answered  He has degenerative labral tear in his right hip a very large cam deformity and he is in tremendous pain he is complaining of clicking locking popping and catching.  I performed a hip arthroscopy on his left  hip several years prior he is doing reasonably well there but his right hips more arthritic and unfortunately hip arthroscopy would not yield positive results due to his existing arthritis in labral tear and cam deformity I think that right total hip arthroplasty is reasonable    Treatment options were discussed. The surgical process of  right hip replacement was discussed in detail with Ant Webber   including a detailed discussion of the procedure itself including antibiotic therapy and prognosis. The typical perioperative and post-operative course was discussed and perioperative risks were discussed to the patient's satisfaction.  Risks and complications discussed included but were not limited to the risks of anesthetic complications, continued recurrence of infection, further surgery, fracture, bleeding, wound healing complications, aseptic loosening, instability, limb length inequality, neurologic dysfunction including numbness and wesakness,  DVT, pulmonary embolism, perioperative medical risks (cardiac, pulmonary, renal, neurologic), and death and the patient elects to proceed.   The patient should get medically cleared.      There are no Patient Instructions on file for this visit.    No orders of the defined types were placed in this encounter.      Procedures

## 2024-09-06 NOTE — PROGRESS NOTES
CC:  Hip pain  63 y.o. Male returns to clinic for a follow up visit regarding     ICD-10-CM ICD-9-CM   1. Osteoarthritis of right hip, unspecified osteoarthritis type  M16.11 715.95   2. Primary osteoarthritis of left hip  M16.12 715.15       Patient is here today to review the results of his MRI and discuss treatment options moving forward.       PAST MEDICAL HISTORY:   Past Medical History:   Diagnosis Date    Diabetes     Diarrhea 08/03/2023    Hypertension     Lumbar post-laminectomy syndrome     Lumbosacral radiculopathy     Sleep apnea     Transient ischemic attack (TIA) 2024       PAST SURGICAL HISTORY:   Past Surgical History:   Procedure Laterality Date    BACK SURGERY  2013    Bilateral L4-L5 TFESI Bilateral 10-, 8-2-2017, 6-    Dr Simms    Bilateral L5-S1 TFESI Bilateral 05/15/2019    Dr Simms    CHOLECYSTOTOMY      EPIDURAL STEROID INJECTION N/A 2/13/2024    Procedure: Injection, Steroid, Epidural, L4/5;  Surgeon: Nani Simms MD;  Location: [facility];  Service: Pain Management;  Laterality: N/A;    HAND SURGERY  2003    HIP ARTHROSCOPY W/ LABRAL DEBRIDEMENT  2020    L5-S1 SUSAN  02/27/2019    Dr Simms    Left L4-L5 TFESI Left 8-, 7-, 6-8-2016    Dr Simms    SELECTIVE INJECTION OF ANESTHETIC AGENT AROUND LUMBAR SPINAL NERVE ROOT BY TRANSFORAMINAL APPROACH Left 9/16/2021    Procedure: Left L5-S1 TFESI;  Surgeon: Nani Simms MD;  Location: [facility];  Service: Pain Management;  Laterality: Left;  NO VAC   WILL BE TESTED    RECENTLY HAD COVID    TONSILLECTOMY      UMBILICAL HERNIA REPAIR           PHYSICAL EXAMINATION:  General    Nursing note and vitals reviewed.  Constitutional: He is oriented to person, place, and time. He appears well-developed and well-nourished.   HENT:   Head: Normocephalic and atraumatic.   Nose: Nose normal.   Eyes: EOM are normal. Pupils are equal, round, and reactive to light.   Neck: Neck supple.   Cardiovascular:   Normal rate and intact distal pulses.            Pulmonary/Chest: Effort normal. No respiratory distress. He exhibits no tenderness.   Abdominal: Soft. He exhibits no distension. There is no abdominal tenderness.   Neurological: He is alert and oriented to person, place, and time. He has normal reflexes. He displays normal reflexes. No cranial nerve deficit. He exhibits normal muscle tone. Coordination normal.   Psychiatric: He has a normal mood and affect. His behavior is normal. Judgment and thought content normal.     General Musculoskeletal Exam   Pelvic Obliquity: none        Right Hip Exam   Right hip exam is normal.     Inspection   Deformity of hip.    Range of Motion   Extension:  abnormal   Flexion:  abnormal   External rotation:  abnormal   Internal rotation:  abnormal     Tests   Pain w/ forced internal rotation (BETZY): present  Pain w/ forced external rotation (FADIR): present  Circumduction test: positive  Log Roll: positive    Other   Sensation: normal  Left Hip Exam     Tenderness   The patient tender to palpation of the trochanteric bursa.    Range of Motion   Extension:  normal   Flexion:  normal   External rotation:  normal   Internal rotation: normal     Tests   Pain w/ forced internal rotation (BETZY): present  Pain w/ forced external rotation (FADIR): present  Circumduction test: positive  Log Roll: positive    Other   Sensation: normal      Back (L-Spine & T-Spine) / Neck (C-Spine) Exam   Back exam is normal.    Back (L-Spine & T-Spine) Range of Motion   The patient has abnormal back ROM.      Vascular Exam     Right Pulses    Posterior Tibial:      2+        Left Pulses  Dorsalis Pedis:      2+  Posterior Tibial:      2+            IMAGING:  MRI Hip Without Contrast Left    Result Date: 8/30/2024  EXAMINATION: MRI HIP WITHOUT CONTRAST LEFT CLINICAL HISTORY: LEFT HIP PAIN; Pain in left hip TECHNIQUE: Sagittal, axial and coronal imaging of the left hip was performed using T1, T2, STIR and  gradient sequence. COMPARISON: None available FINDINGS: Joint alignment is normal. No joint effusion is seen.  Mild left hip degenerative change present. No other abnormal osseous marrow signal is present. Muscles, tendons and ligaments are intact without signal abnormality. No abnormal fluid collections or other signal abnormality is seen in the soft tissues. No other abnormalities are demonstrated.     Mild hip osteoarthrosis. Electronically signed by: Timur Sandoval Date:    08/30/2024 Time:    07:49    MRI Hip Without Contrast Right    Result Date: 8/30/2024  EXAMINATION: MRI HIP WITHOUT CONTRAST RIGHT CLINICAL HISTORY: RIGHT HIP PAIN; Pain in right hip TECHNIQUE: Sagittal, axial and coronal imaging of the right hip was performed using T1, T2, STIR and gradient sequence. COMPARISON: None available FINDINGS: Joint alignment is normal. No joint effusion is seen.  Mild hip degenerative change present. No other abnormal osseous marrow signal is present. Muscles, tendons and ligaments are intact without signal abnormality. No abnormal fluid collections or other signal abnormality is seen in the soft tissues. No other abnormalities are demonstrated.     Mild hip osteoarthrosis. Electronically signed by: Timur Sandoval Date:    08/30/2024 Time:    07:47       Per my read he has significant right hip osteoarthritis and degenerative labral tear on the right hip with a very large cam deformity in significant joint space narrowing right hip    ASSESSMENT:      ICD-10-CM ICD-9-CM   1. Osteoarthritis of right hip, unspecified osteoarthritis type  M16.11 715.95   2. Primary osteoarthritis of left hip  M16.12 715.15       PLAN:     -Findings and treatment options were discussed with the patient  -All questions answered  He has degenerative labral tear in his right hip a very large cam deformity and he is in tremendous pain he is complaining of clicking locking popping and catching.  I performed a hip arthroscopy on his left  hip several years prior he is doing reasonably well there but his right hips more arthritic and unfortunately hip arthroscopy would not yield positive results due to his existing arthritis in labral tear and cam deformity I think that right total hip arthroplasty is reasonable    Treatment options were discussed. The surgical process of  right hip replacement was discussed in detail with Ant Webber   including a detailed discussion of the procedure itself including antibiotic therapy and prognosis. The typical perioperative and post-operative course was discussed and perioperative risks were discussed to the patient's satisfaction.  Risks and complications discussed included but were not limited to the risks of anesthetic complications, continued recurrence of infection, further surgery, fracture, bleeding, wound healing complications, aseptic loosening, instability, limb length inequality, neurologic dysfunction including numbness and wesakness,  DVT, pulmonary embolism, perioperative medical risks (cardiac, pulmonary, renal, neurologic), and death and the patient elects to proceed.   The patient should get medically cleared.      There are no Patient Instructions on file for this visit.    No orders of the defined types were placed in this encounter.      Procedures

## 2024-09-09 ENCOUNTER — PATIENT MESSAGE (OUTPATIENT)
Dept: ORTHOPEDICS | Facility: CLINIC | Age: 64
End: 2024-09-09
Payer: OTHER GOVERNMENT

## 2024-09-11 DIAGNOSIS — M16.11 OSTEOARTHRITIS OF RIGHT HIP, UNSPECIFIED OSTEOARTHRITIS TYPE: Primary | ICD-10-CM

## 2024-09-11 DIAGNOSIS — Z01.818 PREPROCEDURAL EXAMINATION: Primary | ICD-10-CM

## 2024-09-11 DIAGNOSIS — M16.11 OSTEOARTHRITIS OF RIGHT HIP: ICD-10-CM

## 2024-09-11 RX ORDER — SODIUM CHLORIDE 9 MG/ML
INJECTION, SOLUTION INTRAVENOUS CONTINUOUS
OUTPATIENT
Start: 2024-09-11

## 2024-09-11 RX ORDER — TRANEXAMIC ACID 10 MG/ML
1000 INJECTION, SOLUTION INTRAVENOUS
OUTPATIENT
Start: 2024-09-11

## 2024-09-11 RX ORDER — MUPIROCIN 20 MG/G
OINTMENT TOPICAL
OUTPATIENT
Start: 2024-09-11

## 2024-09-18 ENCOUNTER — HOSPITAL ENCOUNTER (OUTPATIENT)
Dept: RADIOLOGY | Facility: HOSPITAL | Age: 64
Discharge: HOME | End: 2024-09-18
Attending: ORTHOPAEDIC SURGERY
Payer: OTHER GOVERNMENT

## 2024-09-18 ENCOUNTER — OFFICE VISIT (OUTPATIENT)
Dept: INTERNAL MEDICINE | Facility: CLINIC | Age: 64
End: 2024-09-18
Payer: OTHER GOVERNMENT

## 2024-09-18 ENCOUNTER — CLINICAL SUPPORT (OUTPATIENT)
Dept: CARDIOLOGY | Facility: CLINIC | Age: 64
End: 2024-09-18
Payer: OTHER GOVERNMENT

## 2024-09-18 VITALS
BODY MASS INDEX: 41.85 KG/M2 | TEMPERATURE: 98 F | HEART RATE: 84 BPM | SYSTOLIC BLOOD PRESSURE: 118 MMHG | OXYGEN SATURATION: 99 % | WEIGHT: 298.94 LBS | DIASTOLIC BLOOD PRESSURE: 80 MMHG | HEIGHT: 71 IN

## 2024-09-18 DIAGNOSIS — Z01.818 PREPROCEDURAL EXAMINATION: ICD-10-CM

## 2024-09-18 DIAGNOSIS — E11.9 TYPE 2 DIABETES MELLITUS WITHOUT COMPLICATION, WITHOUT LONG-TERM CURRENT USE OF INSULIN: ICD-10-CM

## 2024-09-18 DIAGNOSIS — G47.33 OBSTRUCTIVE SLEEP APNEA: ICD-10-CM

## 2024-09-18 DIAGNOSIS — Z86.73 HISTORY OF TIA (TRANSIENT ISCHEMIC ATTACK): ICD-10-CM

## 2024-09-18 DIAGNOSIS — E78.5 DYSLIPIDEMIA: ICD-10-CM

## 2024-09-18 DIAGNOSIS — M16.11 OSTEOARTHRITIS OF RIGHT HIP, UNSPECIFIED OSTEOARTHRITIS TYPE: ICD-10-CM

## 2024-09-18 DIAGNOSIS — Z01.818 PREOP EXAMINATION: Primary | ICD-10-CM

## 2024-09-18 DIAGNOSIS — K21.9 GASTROESOPHAGEAL REFLUX DISEASE WITHOUT ESOPHAGITIS: ICD-10-CM

## 2024-09-18 DIAGNOSIS — I10 ESSENTIAL HYPERTENSION: ICD-10-CM

## 2024-09-18 DIAGNOSIS — E66.01 CLASS 3 SEVERE OBESITY WITH BODY MASS INDEX (BMI) OF 40.0 TO 44.9 IN ADULT, UNSPECIFIED OBESITY TYPE, UNSPECIFIED WHETHER SERIOUS COMORBIDITY PRESENT: ICD-10-CM

## 2024-09-18 PROBLEM — E66.813 CLASS 3 SEVERE OBESITY WITH BODY MASS INDEX (BMI) OF 40.0 TO 44.9 IN ADULT: Status: ACTIVE | Noted: 2024-09-18

## 2024-09-18 PROCEDURE — 99999 PR PBB SHADOW E&M-EST. PATIENT-LVL IV: CPT | Mod: PBBFAC,,, | Performed by: INTERNAL MEDICINE

## 2024-09-18 PROCEDURE — 71046 X-RAY EXAM CHEST 2 VIEWS: CPT | Mod: TC

## 2024-09-18 PROCEDURE — 99204 OFFICE O/P NEW MOD 45 MIN: CPT | Mod: S$PBB,,, | Performed by: INTERNAL MEDICINE

## 2024-09-18 PROCEDURE — 99212 OFFICE O/P EST SF 10 MIN: CPT | Mod: PBBFAC,25

## 2024-09-18 PROCEDURE — 71046 X-RAY EXAM CHEST 2 VIEWS: CPT | Mod: 26,,, | Performed by: RADIOLOGY

## 2024-09-18 PROCEDURE — 99214 OFFICE O/P EST MOD 30 MIN: CPT | Mod: PBBFAC,25 | Performed by: INTERNAL MEDICINE

## 2024-09-18 PROCEDURE — 99999 PR PBB SHADOW E&M-EST. PATIENT-LVL II: CPT | Mod: PBBFAC,,,

## 2024-09-18 RX ORDER — SITAGLIPTIN 100 MG/1
100 TABLET ORAL DAILY
COMMUNITY
Start: 2024-09-08

## 2024-09-18 NOTE — PROGRESS NOTES
Subjective     Patient ID: Ant Webber is a 63 y.o. male.    Chief Complaint: Pre-op Exam (Right hip replacement/)    Mr. Webber is a 63-year-old male the presents today for surgical preop risk stratification.  Plan is for right hip replacement on September 30th with Dr. Mcintosh.  He has had his left hip done in the past and had a revision in 2020.  No issues with anesthesia and no complications at that time.  He has a past medical history of hypertension, dyslipidemia, diabetes mellitus type 2, GERD, and osteoarthritis.  Also with a history of obstructive sleep apnea.  He now has the inspire.  About a year ago he had some issues with double vision in his left eye.  He had an MRI and was diagnosed with a TIA.  His vision improved.  He has not had any further issues.  He was placed on an aspirin at that time.  No known history of coronary artery disease, CHF, or valvular heart disease.  He denies any chest pain at rest or with exertion.  He has seen Dr. Gilmore at [facility] in the past and noninvasive workup was okay.  He is resting comfortably today in no distress.  He is afebrile and vital signs are stable.      Review of Systems   Constitutional:  Negative for appetite change, chills and fever.   HENT:  Negative for ear pain, hearing loss, sinus pressure/congestion and sore throat.    Eyes:  Negative for pain, redness and visual disturbance.   Respiratory:  Negative for apnea, cough, shortness of breath and wheezing.    Cardiovascular:  Negative for chest pain, palpitations and leg swelling.   Gastrointestinal:  Negative for abdominal pain, blood in stool, constipation, diarrhea and nausea.   Endocrine: Negative for cold intolerance, heat intolerance and polyuria.   Genitourinary:  Negative for dysuria and hematuria.   Musculoskeletal:  Positive for arthralgias. Negative for back pain, joint swelling, myalgias and neck pain.   Integumentary:  Negative for pallor and rash.   Allergic/Immunologic: Negative for frequent  infections.   Neurological:  Negative for tremors, seizures, weakness and headaches.   Hematological:  Negative for adenopathy.   Psychiatric/Behavioral:  Negative for confusion, dysphoric mood and sleep disturbance. The patient is not nervous/anxious.           Objective     Physical Exam  Vitals and nursing note reviewed.   Constitutional:       General: He is not in acute distress.     Appearance: Normal appearance. He is obese. He is not ill-appearing.   HENT:      Head: Normocephalic and atraumatic.      Right Ear: External ear normal.      Left Ear: External ear normal.      Nose: Nose normal.      Mouth/Throat:      Pharynx: Oropharynx is clear.   Eyes:      Extraocular Movements: Extraocular movements intact.      Conjunctiva/sclera: Conjunctivae normal.      Pupils: Pupils are equal, round, and reactive to light.   Neck:      Vascular: No carotid bruit.   Cardiovascular:      Rate and Rhythm: Normal rate and regular rhythm.      Pulses: Normal pulses.      Heart sounds: Normal heart sounds. No murmur heard.  Pulmonary:      Effort: No respiratory distress.      Breath sounds: Normal breath sounds. No wheezing or rales.   Abdominal:      General: Bowel sounds are normal.      Palpations: Abdomen is soft.   Musculoskeletal:      Cervical back: Normal range of motion and neck supple.      Right lower leg: No edema.      Left lower leg: No edema.   Skin:     General: Skin is warm and dry.      Capillary Refill: Capillary refill takes less than 2 seconds.      Coloration: Skin is not pale.   Neurological:      General: No focal deficit present.      Mental Status: He is alert and oriented to person, place, and time.      Cranial Nerves: No cranial nerve deficit.      Motor: No weakness.      Gait: Gait abnormal.      Comments: Antalgic gait   Psychiatric:         Mood and Affect: Mood normal.         Judgment: Judgment normal.            Assessment and Plan     1. Preop examination    2. Essential  hypertension    3. Dyslipidemia    4. Type 2 diabetes mellitus without complication, without long-term current use of insulin    5. Obstructive sleep apnea    6. Osteoarthritis of right hip, unspecified osteoarthritis type    7. History of TIA (transient ischemic attack)    8. Gastroesophageal reflux disease without esophagitis        1. Right hip osteoarthritis-plan for right total hip replacement.  We have reviewed available lab work and imaging.  No known history of coronary artery disease, CHF, or valvular heart disease.  He did have a TIA in the past but no issues since then.  His revised cardiac risk index is class 2.  ACS-SRC risk is around 8-1/2% for any complication and 5.6% for series complication.  Overall he is considered low to intermediate risk for this procedure.  He is optimized.  Okay to proceed to surgery without any further testing.    2. Essential hypertension-blood pressure is well controlled.  It is 118/80 today.  Continue home medications perioperatively    3. Diabetes mellitus type 2-last A1c 7.2%.  He will need to hold his Jardiance 3-4 days prior to surgery.  Okay to cover with sliding scale if needed while in hospital.  Resume home medications at discharge    4. Dyslipidemia-stable on a statin    5. History of TIA-no recent issues.  He is on an aspirin and a statin.  Resume these at discharge    6. Obstructive sleep apnea-the patient has an inspire device    7. GERD-stable on a PPI    Billing based on moderate level of medical decision-making         No follow-ups on file.

## 2024-09-19 DIAGNOSIS — G89.4 CHRONIC PAIN SYNDROME: Primary | ICD-10-CM

## 2024-09-23 ENCOUNTER — HOSPITAL ENCOUNTER (OUTPATIENT)
Dept: RADIOLOGY | Facility: HOSPITAL | Age: 64
Discharge: HOME | End: 2024-09-23
Attending: ORTHOPAEDIC SURGERY
Payer: OTHER GOVERNMENT

## 2024-09-23 DIAGNOSIS — M16.11 OSTEOARTHRITIS OF RIGHT HIP, UNSPECIFIED OSTEOARTHRITIS TYPE: ICD-10-CM

## 2024-09-23 PROCEDURE — 73700 CT LOWER EXTREMITY W/O DYE: CPT | Mod: 26,RT,, | Performed by: RADIOLOGY

## 2024-09-23 PROCEDURE — 73700 CT LOWER EXTREMITY W/O DYE: CPT | Mod: TC,RT

## 2024-09-27 DIAGNOSIS — M25.551 PAIN IN RIGHT HIP: Primary | ICD-10-CM

## 2024-09-30 ENCOUNTER — ANESTHESIA EVENT (OUTPATIENT)
Dept: SURGERY | Facility: HOSPITAL | Age: 64
End: 2024-09-30
Payer: OTHER GOVERNMENT

## 2024-09-30 ENCOUNTER — HOSPITAL ENCOUNTER (OUTPATIENT)
Facility: HOSPITAL | Age: 64
Discharge: HOME | End: 2024-10-01
Attending: ORTHOPAEDIC SURGERY
Payer: OTHER GOVERNMENT

## 2024-09-30 ENCOUNTER — ANESTHESIA (OUTPATIENT)
Dept: SURGERY | Facility: HOSPITAL | Age: 64
End: 2024-09-30
Payer: OTHER GOVERNMENT

## 2024-09-30 DIAGNOSIS — M16.11 OSTEOARTHRITIS OF RIGHT HIP: Primary | ICD-10-CM

## 2024-09-30 DIAGNOSIS — M16.11 OSTEOARTHRITIS OF RIGHT HIP, UNSPECIFIED OSTEOARTHRITIS TYPE: ICD-10-CM

## 2024-09-30 DIAGNOSIS — Z96.649 STATUS POST TOTAL REPLACEMENT OF HIP, UNSPECIFIED LATERALITY: ICD-10-CM

## 2024-09-30 PROBLEM — Z96.641 S/P TOTAL RIGHT HIP ARTHROPLASTY: Status: ACTIVE | Noted: 2024-09-30

## 2024-09-30 LAB
GLUCOSE SERPL-MCNC: 143 MG/DL (ref 70–105)
GLUCOSE SERPL-MCNC: 175 MG/DL (ref 70–105)
GLUCOSE SERPL-MCNC: 218 MG/DL (ref 70–105)

## 2024-09-30 PROCEDURE — 25000003 PHARM REV CODE 250

## 2024-09-30 PROCEDURE — 71000033 HC RECOVERY, INTIAL HOUR: Performed by: ORTHOPAEDIC SURGERY

## 2024-09-30 PROCEDURE — 0055T BONE SRGRY CMPTR CT/MRI IMAG: CPT | Mod: ,,, | Performed by: ORTHOPAEDIC SURGERY

## 2024-09-30 PROCEDURE — 25000003 PHARM REV CODE 250: Performed by: NURSE ANESTHETIST, CERTIFIED REGISTERED

## 2024-09-30 PROCEDURE — 63600175 PHARM REV CODE 636 W HCPCS: Performed by: ORTHOPAEDIC SURGERY

## 2024-09-30 PROCEDURE — G0378 HOSPITAL OBSERVATION PER HR: HCPCS

## 2024-09-30 PROCEDURE — 37000008 HC ANESTHESIA 1ST 15 MINUTES: Performed by: ORTHOPAEDIC SURGERY

## 2024-09-30 PROCEDURE — 37000009 HC ANESTHESIA EA ADD 15 MINS: Performed by: ORTHOPAEDIC SURGERY

## 2024-09-30 PROCEDURE — 97165 OT EVAL LOW COMPLEX 30 MIN: CPT

## 2024-09-30 PROCEDURE — 71000016 HC POSTOP RECOV ADDL HR: Performed by: ORTHOPAEDIC SURGERY

## 2024-09-30 PROCEDURE — C1776 JOINT DEVICE (IMPLANTABLE): HCPCS | Performed by: ORTHOPAEDIC SURGERY

## 2024-09-30 PROCEDURE — 27000716 HC OXISENSOR PROBE, ANY SIZE: Performed by: ANESTHESIOLOGY

## 2024-09-30 PROCEDURE — 63600175 PHARM REV CODE 636 W HCPCS: Performed by: NURSE ANESTHETIST, CERTIFIED REGISTERED

## 2024-09-30 PROCEDURE — 88304 TISSUE EXAM BY PATHOLOGIST: CPT | Mod: TC,SUR | Performed by: ORTHOPAEDIC SURGERY

## 2024-09-30 PROCEDURE — 27201480 HC GLIDESCOPE: Performed by: ANESTHESIOLOGY

## 2024-09-30 PROCEDURE — C1713 ANCHOR/SCREW BN/BN,TIS/BN: HCPCS | Performed by: ORTHOPAEDIC SURGERY

## 2024-09-30 PROCEDURE — 36000712 HC OR TIME LEV V 1ST 15 MIN: Performed by: ORTHOPAEDIC SURGERY

## 2024-09-30 PROCEDURE — 25000003 PHARM REV CODE 250: Performed by: ORTHOPAEDIC SURGERY

## 2024-09-30 PROCEDURE — D9220A PRA ANESTHESIA: Mod: CRNA,,, | Performed by: NURSE ANESTHETIST, CERTIFIED REGISTERED

## 2024-09-30 PROCEDURE — 96366 THER/PROPH/DIAG IV INF ADDON: CPT

## 2024-09-30 PROCEDURE — 27000689 HC BLADE LARYNGOSCOPE ANY SIZE: Performed by: ANESTHESIOLOGY

## 2024-09-30 PROCEDURE — 71000015 HC POSTOP RECOV 1ST HR: Performed by: ORTHOPAEDIC SURGERY

## 2024-09-30 PROCEDURE — 96376 TX/PRO/DX INJ SAME DRUG ADON: CPT

## 2024-09-30 PROCEDURE — 27201423 OPTIME MED/SURG SUP & DEVICES STERILE SUPPLY: Performed by: ORTHOPAEDIC SURGERY

## 2024-09-30 PROCEDURE — 36000713 HC OR TIME LEV V EA ADD 15 MIN: Performed by: ORTHOPAEDIC SURGERY

## 2024-09-30 PROCEDURE — 27000510 HC BLANKET BAIR HUGGER ANY SIZE: Performed by: ANESTHESIOLOGY

## 2024-09-30 PROCEDURE — 97162 PT EVAL MOD COMPLEX 30 MIN: CPT

## 2024-09-30 PROCEDURE — D9220A PRA ANESTHESIA: Mod: ANES,,, | Performed by: ANESTHESIOLOGY

## 2024-09-30 PROCEDURE — 82962 GLUCOSE BLOOD TEST: CPT

## 2024-09-30 PROCEDURE — 63600175 PHARM REV CODE 636 W HCPCS

## 2024-09-30 PROCEDURE — 96375 TX/PRO/DX INJ NEW DRUG ADDON: CPT

## 2024-09-30 PROCEDURE — 27000655: Performed by: ANESTHESIOLOGY

## 2024-09-30 PROCEDURE — 27130 TOTAL HIP ARTHROPLASTY: CPT | Mod: RT,,, | Performed by: ORTHOPAEDIC SURGERY

## 2024-09-30 PROCEDURE — 71000039 HC RECOVERY, EACH ADD'L HOUR: Performed by: ORTHOPAEDIC SURGERY

## 2024-09-30 PROCEDURE — 63600175 PHARM REV CODE 636 W HCPCS: Performed by: ANESTHESIOLOGY

## 2024-09-30 PROCEDURE — 96365 THER/PROPH/DIAG IV INF INIT: CPT

## 2024-09-30 PROCEDURE — 88311 DECALCIFY TISSUE: CPT | Mod: TC,SUR | Performed by: ORTHOPAEDIC SURGERY

## 2024-09-30 PROCEDURE — 27000165 HC TUBE, ETT CUFFED: Performed by: ANESTHESIOLOGY

## 2024-09-30 DEVICE — LINER- CEMENTLESS
Type: IMPLANTABLE DEVICE | Site: HIP | Status: FUNCTIONAL
Brand: MDM

## 2024-09-30 DEVICE — RESTORATION ADM/MDM X3 INSERT
Type: IMPLANTABLE DEVICE | Site: HIP | Status: FUNCTIONAL
Brand: RESTORATION ADM/MDM X3 INSERT

## 2024-09-30 DEVICE — TRIDENT II TRITANIUM CLUSTER 54E
Type: IMPLANTABLE DEVICE | Site: HIP | Status: FUNCTIONAL
Brand: TRIDENT II

## 2024-09-30 DEVICE — CERAMIC V40 FEMORAL HEAD
Type: IMPLANTABLE DEVICE | Site: HIP | Status: FUNCTIONAL
Brand: BIOLOX

## 2024-09-30 DEVICE — 6.5MM LOW PROFILE HEX SCREW 35MM
Type: IMPLANTABLE DEVICE | Site: HIP | Status: FUNCTIONAL
Brand: TRIDENT II

## 2024-09-30 DEVICE — 127 DEGREE NECK ANGLE HIP STEM
Type: IMPLANTABLE DEVICE | Site: HIP | Status: FUNCTIONAL
Brand: ACCOLADE

## 2024-09-30 RX ORDER — TRANEXAMIC ACID 10 MG/ML
1000 INJECTION, SOLUTION INTRAVENOUS
Status: COMPLETED | OUTPATIENT
Start: 2024-09-30 | End: 2024-09-30

## 2024-09-30 RX ORDER — DIPHENHYDRAMINE HYDROCHLORIDE 50 MG/ML
25 INJECTION INTRAMUSCULAR; INTRAVENOUS EVERY 6 HOURS PRN
Status: DISCONTINUED | OUTPATIENT
Start: 2024-09-30 | End: 2024-09-30 | Stop reason: HOSPADM

## 2024-09-30 RX ORDER — PANTOPRAZOLE SODIUM 40 MG/1
40 TABLET, DELAYED RELEASE ORAL DAILY
Status: DISCONTINUED | OUTPATIENT
Start: 2024-09-30 | End: 2024-10-01 | Stop reason: HOSPADM

## 2024-09-30 RX ORDER — TIZANIDINE 4 MG/1
4 TABLET ORAL EVERY 8 HOURS PRN
Status: DISCONTINUED | OUTPATIENT
Start: 2024-09-30 | End: 2024-10-01 | Stop reason: HOSPADM

## 2024-09-30 RX ORDER — MORPHINE SULFATE 10 MG/ML
4 INJECTION INTRAMUSCULAR; INTRAVENOUS; SUBCUTANEOUS EVERY 5 MIN PRN
Status: DISCONTINUED | OUTPATIENT
Start: 2024-09-30 | End: 2024-09-30 | Stop reason: HOSPADM

## 2024-09-30 RX ORDER — ZOLPIDEM TARTRATE 5 MG/1
5 TABLET ORAL NIGHTLY PRN
Status: DISCONTINUED | OUTPATIENT
Start: 2024-09-30 | End: 2024-10-01 | Stop reason: HOSPADM

## 2024-09-30 RX ORDER — ONDANSETRON 4 MG/1
8 TABLET, ORALLY DISINTEGRATING ORAL EVERY 8 HOURS PRN
Status: DISCONTINUED | OUTPATIENT
Start: 2024-09-30 | End: 2024-10-01 | Stop reason: HOSPADM

## 2024-09-30 RX ORDER — ROPIVACAINE/EPI/CLONIDINE/KET 2.46-0.005
SYRINGE (ML) INJECTION
Status: DISCONTINUED | OUTPATIENT
Start: 2024-09-30 | End: 2024-09-30 | Stop reason: HOSPADM

## 2024-09-30 RX ORDER — SODIUM CHLORIDE 9 MG/ML
INJECTION, SOLUTION INTRAVENOUS CONTINUOUS
Status: DISCONTINUED | OUTPATIENT
Start: 2024-09-30 | End: 2024-09-30

## 2024-09-30 RX ORDER — OXYCODONE HYDROCHLORIDE 5 MG/1
10 TABLET ORAL EVERY 4 HOURS PRN
Status: DISCONTINUED | OUTPATIENT
Start: 2024-09-30 | End: 2024-10-01

## 2024-09-30 RX ORDER — ACETAMINOPHEN 500 MG
1000 TABLET ORAL EVERY 8 HOURS
Status: DISCONTINUED | OUTPATIENT
Start: 2024-09-30 | End: 2024-10-01 | Stop reason: HOSPADM

## 2024-09-30 RX ORDER — PREGABALIN 75 MG/1
75 CAPSULE ORAL 2 TIMES DAILY
Status: DISCONTINUED | OUTPATIENT
Start: 2024-09-30 | End: 2024-10-01 | Stop reason: HOSPADM

## 2024-09-30 RX ORDER — MUPIROCIN 20 MG/G
1 OINTMENT TOPICAL 2 TIMES DAILY
Status: DISCONTINUED | OUTPATIENT
Start: 2024-09-30 | End: 2024-10-01 | Stop reason: HOSPADM

## 2024-09-30 RX ORDER — GLUCAGON 1 MG
1 KIT INJECTION
Status: DISCONTINUED | OUTPATIENT
Start: 2024-09-30 | End: 2024-09-30 | Stop reason: HOSPADM

## 2024-09-30 RX ORDER — AMLODIPINE BESYLATE 2.5 MG/1
2.5 TABLET ORAL DAILY
Status: DISCONTINUED | OUTPATIENT
Start: 2024-09-30 | End: 2024-10-01 | Stop reason: HOSPADM

## 2024-09-30 RX ORDER — ONDANSETRON HYDROCHLORIDE 2 MG/ML
INJECTION, SOLUTION INTRAVENOUS
Status: DISCONTINUED | OUTPATIENT
Start: 2024-09-30 | End: 2024-09-30

## 2024-09-30 RX ORDER — CELECOXIB 100 MG/1
200 CAPSULE ORAL 2 TIMES DAILY
Status: DISCONTINUED | OUTPATIENT
Start: 2024-09-30 | End: 2024-10-01 | Stop reason: HOSPADM

## 2024-09-30 RX ORDER — LOPERAMIDE HYDROCHLORIDE 2 MG/1
4 CAPSULE ORAL ONCE
Status: DISCONTINUED | OUTPATIENT
Start: 2024-09-30 | End: 2024-10-01 | Stop reason: HOSPADM

## 2024-09-30 RX ORDER — OXYCODONE HYDROCHLORIDE 5 MG/1
5 TABLET ORAL EVERY 4 HOURS PRN
Status: DISCONTINUED | OUTPATIENT
Start: 2024-09-30 | End: 2024-10-01 | Stop reason: HOSPADM

## 2024-09-30 RX ORDER — DOCUSATE SODIUM 100 MG/1
100 CAPSULE, LIQUID FILLED ORAL EVERY 12 HOURS
Status: DISCONTINUED | OUTPATIENT
Start: 2024-09-30 | End: 2024-10-01

## 2024-09-30 RX ORDER — NAPROXEN SODIUM 220 MG/1
325 TABLET, FILM COATED ORAL DAILY
Status: DISCONTINUED | OUTPATIENT
Start: 2024-10-01 | End: 2024-10-01 | Stop reason: HOSPADM

## 2024-09-30 RX ORDER — DEXAMETHASONE SODIUM PHOSPHATE 4 MG/ML
INJECTION, SOLUTION INTRA-ARTICULAR; INTRALESIONAL; INTRAMUSCULAR; INTRAVENOUS; SOFT TISSUE
Status: DISCONTINUED | OUTPATIENT
Start: 2024-09-30 | End: 2024-09-30

## 2024-09-30 RX ORDER — PHENYLEPHRINE HYDROCHLORIDE 10 MG/ML
INJECTION INTRAVENOUS
Status: DISCONTINUED | OUTPATIENT
Start: 2024-09-30 | End: 2024-09-30

## 2024-09-30 RX ORDER — BISACODYL 10 MG/1
10 SUPPOSITORY RECTAL DAILY PRN
Status: DISCONTINUED | OUTPATIENT
Start: 2024-09-30 | End: 2024-10-01 | Stop reason: HOSPADM

## 2024-09-30 RX ORDER — ROCURONIUM BROMIDE 10 MG/ML
INJECTION, SOLUTION INTRAVENOUS
Status: DISCONTINUED | OUTPATIENT
Start: 2024-09-30 | End: 2024-09-30

## 2024-09-30 RX ORDER — MEPERIDINE HYDROCHLORIDE 25 MG/ML
25 INJECTION INTRAMUSCULAR; INTRAVENOUS; SUBCUTANEOUS EVERY 10 MIN PRN
Status: DISCONTINUED | OUTPATIENT
Start: 2024-09-30 | End: 2024-09-30 | Stop reason: HOSPADM

## 2024-09-30 RX ORDER — SODIUM CHLORIDE 0.9 % (FLUSH) 0.9 %
3 SYRINGE (ML) INJECTION EVERY 6 HOURS PRN
Status: DISCONTINUED | OUTPATIENT
Start: 2024-09-30 | End: 2024-10-01 | Stop reason: HOSPADM

## 2024-09-30 RX ORDER — FAMOTIDINE 20 MG/1
20 TABLET, FILM COATED ORAL 2 TIMES DAILY
Status: DISCONTINUED | OUTPATIENT
Start: 2024-09-30 | End: 2024-10-01 | Stop reason: HOSPADM

## 2024-09-30 RX ORDER — LIDOCAINE HYDROCHLORIDE 20 MG/ML
INJECTION, SOLUTION EPIDURAL; INFILTRATION; INTRACAUDAL; PERINEURAL
Status: DISCONTINUED | OUTPATIENT
Start: 2024-09-30 | End: 2024-09-30

## 2024-09-30 RX ORDER — ACETAMINOPHEN 10 MG/ML
1000 INJECTION, SOLUTION INTRAVENOUS ONCE
Status: COMPLETED | OUTPATIENT
Start: 2024-09-30 | End: 2024-09-30

## 2024-09-30 RX ORDER — SITAGLIPTIN 100 MG/1
100 TABLET ORAL DAILY
Status: DISCONTINUED | OUTPATIENT
Start: 2024-09-30 | End: 2024-09-30

## 2024-09-30 RX ORDER — HYDROMORPHONE HYDROCHLORIDE 2 MG/ML
INJECTION, SOLUTION INTRAMUSCULAR; INTRAVENOUS; SUBCUTANEOUS
Status: DISCONTINUED | OUTPATIENT
Start: 2024-09-30 | End: 2024-09-30

## 2024-09-30 RX ORDER — ONDANSETRON HYDROCHLORIDE 2 MG/ML
4 INJECTION, SOLUTION INTRAVENOUS DAILY PRN
Status: DISCONTINUED | OUTPATIENT
Start: 2024-09-30 | End: 2024-09-30 | Stop reason: HOSPADM

## 2024-09-30 RX ORDER — FENTANYL CITRATE 50 UG/ML
INJECTION, SOLUTION INTRAMUSCULAR; INTRAVENOUS
Status: DISCONTINUED | OUTPATIENT
Start: 2024-09-30 | End: 2024-09-30

## 2024-09-30 RX ORDER — MIDAZOLAM HYDROCHLORIDE 1 MG/ML
INJECTION INTRAMUSCULAR; INTRAVENOUS
Status: DISCONTINUED | OUTPATIENT
Start: 2024-09-30 | End: 2024-09-30

## 2024-09-30 RX ORDER — PROPOFOL 10 MG/ML
VIAL (ML) INTRAVENOUS
Status: DISCONTINUED | OUTPATIENT
Start: 2024-09-30 | End: 2024-09-30

## 2024-09-30 RX ORDER — HYDROMORPHONE HYDROCHLORIDE 2 MG/ML
0.5 INJECTION, SOLUTION INTRAMUSCULAR; INTRAVENOUS; SUBCUTANEOUS EVERY 5 MIN PRN
Status: DISCONTINUED | OUTPATIENT
Start: 2024-09-30 | End: 2024-09-30 | Stop reason: HOSPADM

## 2024-09-30 RX ORDER — MUPIROCIN 20 MG/G
OINTMENT TOPICAL
Status: DISCONTINUED | OUTPATIENT
Start: 2024-09-30 | End: 2024-09-30

## 2024-09-30 RX ADMIN — PREGABALIN 75 MG: 75 CAPSULE ORAL at 12:09

## 2024-09-30 RX ADMIN — SUGAMMADEX 200 MG: 100 INJECTION, SOLUTION INTRAVENOUS at 10:09

## 2024-09-30 RX ADMIN — SODIUM CHLORIDE 3 G: 9 INJECTION, SOLUTION INTRAVENOUS at 08:09

## 2024-09-30 RX ADMIN — PHENYLEPHRINE HYDROCHLORIDE 100 MCG: 10 INJECTION INTRAVENOUS at 09:09

## 2024-09-30 RX ADMIN — LIDOCAINE HYDROCHLORIDE 50 MG: 20 INJECTION, SOLUTION INTRAVENOUS at 08:09

## 2024-09-30 RX ADMIN — FENTANYL CITRATE 100 MCG: 50 INJECTION, SOLUTION INTRAMUSCULAR; INTRAVENOUS at 08:09

## 2024-09-30 RX ADMIN — MORPHINE SULFATE 4 MG: 10 INJECTION, SOLUTION INTRAMUSCULAR; INTRAVENOUS at 11:09

## 2024-09-30 RX ADMIN — MORPHINE SULFATE 2 MG: 10 INJECTION, SOLUTION INTRAMUSCULAR; INTRAVENOUS at 12:09

## 2024-09-30 RX ADMIN — CELECOXIB 200 MG: 100 CAPSULE ORAL at 12:09

## 2024-09-30 RX ADMIN — OXYCODONE 10 MG: 5 TABLET ORAL at 09:09

## 2024-09-30 RX ADMIN — CEFAZOLIN 2 G: 2 INJECTION, POWDER, FOR SOLUTION INTRAMUSCULAR; INTRAVENOUS at 06:09

## 2024-09-30 RX ADMIN — OXYCODONE 10 MG: 5 TABLET ORAL at 04:09

## 2024-09-30 RX ADMIN — PHENYLEPHRINE HYDROCHLORIDE 100 MCG: 10 INJECTION INTRAVENOUS at 08:09

## 2024-09-30 RX ADMIN — SODIUM CHLORIDE: 9 INJECTION, SOLUTION INTRAVENOUS at 10:09

## 2024-09-30 RX ADMIN — PROPOFOL 200 MG: 10 INJECTION, EMULSION INTRAVENOUS at 08:09

## 2024-09-30 RX ADMIN — ONDANSETRON 4 MG: 2 INJECTION INTRAMUSCULAR; INTRAVENOUS at 08:09

## 2024-09-30 RX ADMIN — OXYCODONE 10 MG: 5 TABLET ORAL at 12:09

## 2024-09-30 RX ADMIN — ROCURONIUM BROMIDE 25 MG: 10 INJECTION, SOLUTION INTRAVENOUS at 09:09

## 2024-09-30 RX ADMIN — ROCURONIUM BROMIDE 25 MG: 10 INJECTION, SOLUTION INTRAVENOUS at 10:09

## 2024-09-30 RX ADMIN — HYDROMORPHONE HYDROCHLORIDE 1 MG: 2 INJECTION, SOLUTION INTRAMUSCULAR; INTRAVENOUS; SUBCUTANEOUS at 10:09

## 2024-09-30 RX ADMIN — DOCUSATE SODIUM 100 MG: 100 CAPSULE, LIQUID FILLED ORAL at 09:09

## 2024-09-30 RX ADMIN — VANCOMYCIN HYDROCHLORIDE 1500 MG: 1 INJECTION, POWDER, LYOPHILIZED, FOR SOLUTION INTRAVENOUS at 08:09

## 2024-09-30 RX ADMIN — MIDAZOLAM HYDROCHLORIDE 2 MG: 1 INJECTION, SOLUTION INTRAMUSCULAR; INTRAVENOUS at 08:09

## 2024-09-30 RX ADMIN — TRANEXAMIC ACID 1000 MG: 10 INJECTION, SOLUTION INTRAVENOUS at 10:09

## 2024-09-30 RX ADMIN — CELECOXIB 200 MG: 100 CAPSULE ORAL at 09:09

## 2024-09-30 RX ADMIN — SODIUM CHLORIDE: 9 INJECTION, SOLUTION INTRAVENOUS at 08:09

## 2024-09-30 RX ADMIN — PREGABALIN 75 MG: 75 CAPSULE ORAL at 09:09

## 2024-09-30 RX ADMIN — HYDROMORPHONE HYDROCHLORIDE 1 MG: 2 INJECTION, SOLUTION INTRAMUSCULAR; INTRAVENOUS; SUBCUTANEOUS at 09:09

## 2024-09-30 RX ADMIN — DEXAMETHASONE SODIUM PHOSPHATE 8 MG: 4 INJECTION, SOLUTION INTRA-ARTICULAR; INTRALESIONAL; INTRAMUSCULAR; INTRAVENOUS; SOFT TISSUE at 08:09

## 2024-09-30 RX ADMIN — ACETAMINOPHEN 1000 MG: 10 INJECTION, SOLUTION INTRAVENOUS at 12:09

## 2024-09-30 RX ADMIN — SODIUM CHLORIDE: 9 INJECTION, SOLUTION INTRAVENOUS at 07:09

## 2024-09-30 RX ADMIN — FAMOTIDINE 20 MG: 20 TABLET, FILM COATED ORAL at 09:09

## 2024-09-30 RX ADMIN — TRANEXAMIC ACID 1000 MG: 10 INJECTION, SOLUTION INTRAVENOUS at 08:09

## 2024-09-30 RX ADMIN — ROCURONIUM BROMIDE 50 MG: 10 INJECTION, SOLUTION INTRAVENOUS at 08:09

## 2024-09-30 NOTE — H&P
[facility] - [facility]  [facility]  History & Physical    Patient Name: Ant Webber  MRN: [id number]  Patient Class: OP- Observation  Admission Date: 9/30/2024  Attending Physician: Soledad Bolden MD   Primary Care Provider: Bridgett Johnson MD         Patient information was obtained from patient and ER records.     Subjective:     Principal Problem:Osteoarthritis of right hip    Chief Complaint: No chief complaint on file.       HPI: 62 yo male with pmh of osteoarthritis of right hip sp right hip arthroplasty is being admitted for observation overnight after procedure today. Complains of expected post op pain but otherwise no complaints. Medications have been reviewed.  working on home health with appropriate needs for possible discharge tomorrow.     Past Medical History:   Diagnosis Date    Diabetes     Diarrhea 08/03/2023    Hypertension     Lumbar post-laminectomy syndrome     Lumbosacral radiculopathy     Sleep apnea     Transient ischemic attack (TIA) 2024       Past Surgical History:   Procedure Laterality Date    BACK SURGERY  2013    Bilateral L4-L5 TFESI Bilateral 10-, 8-2-2017, 6-    Dr Simms    Bilateral L5-S1 TFESI Bilateral 05/15/2019    Dr Simms    CHOLECYSTOTOMY      EPIDURAL STEROID INJECTION N/A 2/13/2024    Procedure: Injection, Steroid, Epidural, L4/5;  Surgeon: Nani Simms MD;  Location: [facility];  Service: Pain Management;  Laterality: N/A;    HAND SURGERY  2003    HIP ARTHROSCOPY W/ LABRAL DEBRIDEMENT  2020    L5-S1 SUSAN  02/27/2019    Dr Simms    Left L4-L5 TFESI Left 8-, 7-, 6-8-2016    Dr Simms    SELECTIVE INJECTION OF ANESTHETIC AGENT AROUND LUMBAR SPINAL NERVE ROOT BY TRANSFORAMINAL APPROACH Left 9/16/2021    Procedure: Left L5-S1 TFESI;  Surgeon: Nani Simms MD;  [facility]: [facility];  Service: Pain Management;  Laterality: Left;  NO VAC   WILL BE TESTED    RECENTLY HAD COVID     TONSILLECTOMY      UMBILICAL HERNIA REPAIR         Review of patient's allergies indicates:  No Known Allergies    No current facility-administered medications on file prior to encounter.     Current Outpatient Medications on File Prior to Encounter   Medication Sig    amLODIPine (NORVASC) 2.5 MG tablet Take 2.5 mg by mouth once daily.    aspirin (ECOTRIN) 81 MG EC tablet 81 mg.    cetirizine (ZYRTEC) 10 MG tablet Take 10 mg by mouth.    cyanocobalamin (VITAMIN B-12) 1000 MCG tablet 1,000 mcg.    diclofenac sodium (VOLTAREN) 1 % Gel APPLY 2 GRAMS TO AFFECTED AREA(S) 2 TIMES A DAY AS NEEDED FOR PAIN AND INFLAMMATION    meloxicam (MOBIC) 7.5 MG tablet Take 1 tablet (7.5 mg total) by mouth once daily.    methylPREDNISolone (MEDROL DOSEPACK) 4 mg tablet use as directed    pantoprazole (PROTONIX) 40 MG tablet Take 1 tablet (40 mg total) by mouth once daily.    pregabalin (LYRICA) 100 MG capsule TAKE ONE CAPSULE BY MOUTH THREE TIMES DAILY    tiZANidine 4 mg Cap Take 4 mg by mouth every 8 (eight) hours as needed (Spasm).    atorvastatin (LIPITOR) 20 MG tablet TAKE ONE-HALF TABLET BY MOUTH DAILY FOR CHOLESTEROL. STOP MEDICATION AND CALL PROVIDER FOR ANY UNEXPLAINED MUSCLE ACHES,PAIN OR WEAKNESS     Family History       Problem Relation (Age of Onset)    Diabetes Father    Hypertension Mother    Parkinsonism Mother, Father          Tobacco Use    Smoking status: Former     Current packs/day: 0.00     Types: Cigarettes     Quit date:      Years since quittin.7    Smokeless tobacco: Former     Types: Snuff     Quit date:    Substance and Sexual Activity    Alcohol use: Yes     Comment: occasionally    Drug use: Never    Sexual activity: Not on file     Review of Systems   All other systems reviewed and are negative.    Objective:     Vital Signs (Most Recent):  Temp: 98.8 °F (37.1 °C) (24 1121)  Pulse: 80 (24 1315)  Resp: 16 (24 1315)  BP: 126/70 (24 1315)  SpO2: (!) 92 % (24 1315)  Vital Signs (24h Range):  Temp:  [97.9 °F (36.6 °C)-98.8 °F (37.1 °C)] 98.8 °F (37.1 °C)  Pulse:  [73-92] 80  Resp:  [10-19] 16  SpO2:  [80 %-97 %] 92 %  BP: (112-153)/(52-91) 126/70     Weight: 136.1 kg (300 lb)  Body mass index is 41.84 kg/m².     Physical Exam  Vitals and nursing note reviewed.   Constitutional:       Appearance: He is obese.   HENT:      Head: Normocephalic.      Mouth/Throat:      Mouth: Mucous membranes are moist.   Eyes:      Extraocular Movements: Extraocular movements intact.   Neck:      Vascular: No carotid bruit.   Cardiovascular:      Rate and Rhythm: Normal rate and regular rhythm.      Pulses: Normal pulses.      Heart sounds: Normal heart sounds. No murmur heard.  Pulmonary:      Effort: Pulmonary effort is normal. No respiratory distress.      Breath sounds: Normal breath sounds.   Abdominal:      General: Abdomen is flat. Bowel sounds are normal. There is no distension.      Palpations: Abdomen is soft.   Musculoskeletal:      Cervical back: Normal range of motion and neck supple. No tenderness.      Comments: Right hip immobilized   Skin:     General: Skin is warm and dry.   Neurological:      General: No focal deficit present.      Mental Status: He is alert and oriented to person, place, and time.   Psychiatric:         Judgment: Judgment normal.                Significant Labs: All pertinent labs within the past 24 hours have been reviewed.      Significant Imaging: I have reviewed all pertinent imaging results/findings within the past 24 hours.  Assessment/Plan:     * Osteoarthritis of right hip  Post op care now, admitted for observation, pain control    S/P total right hip arthroplasty    - admit for observation, medications reviewed, PT/OT,       VTE Risk Mitigation (From admission, onward)           Ordered     IP VTE LOW RISK PATIENT  Once         09/30/24 1233     Place BLAYNE hose  Until discontinued         09/30/24 1233     Place sequential compression  device  Until discontinued         09/30/24 1233                       On 09/30/2024, patient should be placed in hospital observation services under my care.             Soledad Bolden MD  Department of Hospital Medicine  [facility] - [facility]

## 2024-09-30 NOTE — PLAN OF CARE
Problem: Physical Therapy  Goal: Physical Therapy Goal  Description: Short Term Goals  Independent with HEP  Independent with walkerx 100 feet FWB/WBAT: right lower extremity  Independent with HEP/precautions    Long term goals  Needed equipment for home.       Outcome: Progressing

## 2024-09-30 NOTE — OR NURSING
1117 REC'D TO PACU IN STABLE CONDITION. VSS. SATS 92% ON 6L/FM. INCREASED O2 TO 10L. WILL TITRATE O2 ATOL. PAIN RATED 0 ON FACES SCALE.  DSG TO R HIP C/D/I w/ RUBEN DRAIN NOTED TO R HIP. . WILL CONT TO MONITOR.    1137 UPDATE ON PT STATUS GIVEN TO DAUGHTER VIA TEXT MESSAGE.    1225 TRANSFERRED TO ASC #17 AWAKE IN STABLE CONDITION. REPORT GIVEN TO ARY AHUMADA. /62 HR 88 SATS 94% ON 3L/NC. DAUGHTER AT BEDSIDE.

## 2024-09-30 NOTE — SUBJECTIVE & OBJECTIVE
Past Medical History:   Diagnosis Date    Diabetes     Diarrhea 08/03/2023    Hypertension     Lumbar post-laminectomy syndrome     Lumbosacral radiculopathy     Sleep apnea     Transient ischemic attack (TIA) 2024       Past Surgical History:   Procedure Laterality Date    BACK SURGERY  2013    Bilateral L4-L5 TFESI Bilateral 10-, 8-2-2017, 6-    Dr Simms    Bilateral L5-S1 TFESI Bilateral 05/15/2019    Dr Simms    CHOLECYSTOTOMY      EPIDURAL STEROID INJECTION N/A 2/13/2024    Procedure: Injection, Steroid, Epidural, L4/5;  Surgeon: Nani Simms MD;  Location: [facility];  Service: Pain Management;  Laterality: N/A;    HAND SURGERY  2003    HIP ARTHROSCOPY W/ LABRAL DEBRIDEMENT  2020    L5-S1 SUSAN  02/27/2019    Dr Simms    Left L4-L5 TFESI Left 8-, 7-, 6-8-2016    Dr Simms    SELECTIVE INJECTION OF ANESTHETIC AGENT AROUND LUMBAR SPINAL NERVE ROOT BY TRANSFORAMINAL APPROACH Left 9/16/2021    Procedure: Left L5-S1 TFESI;  Surgeon: Nani Simms MD;  Location: [facility];  Service: Pain Management;  Laterality: Left;  NO VAC   WILL BE TESTED    RECENTLY HAD COVID    TONSILLECTOMY      UMBILICAL HERNIA REPAIR         Review of patient's allergies indicates:  No Known Allergies    No current facility-administered medications on file prior to encounter.     Current Outpatient Medications on File Prior to Encounter   Medication Sig    amLODIPine (NORVASC) 2.5 MG tablet Take 2.5 mg by mouth once daily.    aspirin (ECOTRIN) 81 MG EC tablet 81 mg.    cetirizine (ZYRTEC) 10 MG tablet Take 10 mg by mouth.    cyanocobalamin (VITAMIN B-12) 1000 MCG tablet 1,000 mcg.    diclofenac sodium (VOLTAREN) 1 % Gel APPLY 2 GRAMS TO AFFECTED AREA(S) 2 TIMES A DAY AS NEEDED FOR PAIN AND INFLAMMATION    meloxicam (MOBIC) 7.5 MG tablet Take 1 tablet (7.5 mg total) by mouth once daily.    methylPREDNISolone (MEDROL DOSEPACK) 4 mg tablet use as directed    pantoprazole (PROTONIX) 40 MG  tablet Take 1 tablet (40 mg total) by mouth once daily.    pregabalin (LYRICA) 100 MG capsule TAKE ONE CAPSULE BY MOUTH THREE TIMES DAILY    tiZANidine 4 mg Cap Take 4 mg by mouth every 8 (eight) hours as needed (Spasm).    atorvastatin (LIPITOR) 20 MG tablet TAKE ONE-HALF TABLET BY MOUTH DAILY FOR CHOLESTEROL. STOP MEDICATION AND CALL PROVIDER FOR ANY UNEXPLAINED MUSCLE ACHES,PAIN OR WEAKNESS     Family History       Problem Relation (Age of Onset)    Diabetes Father    Hypertension Mother    Parkinsonism Mother, Father          Tobacco Use    Smoking status: Former     Current packs/day: 0.00     Types: Cigarettes     Quit date:      Years since quittin.7    Smokeless tobacco: Former     Types: Snuff     Quit date:    Substance and Sexual Activity    Alcohol use: Yes     Comment: occasionally    Drug use: Never    Sexual activity: Not on file     Review of Systems   All other systems reviewed and are negative.    Objective:     Vital Signs (Most Recent):  Temp: 98.8 °F (37.1 °C) (24 1121)  Pulse: 80 (24 1315)  Resp: 16 (24 1315)  BP: 126/70 (24 1315)  SpO2: (!) 92 % (24 1315) Vital Signs (24h Range):  Temp:  [97.9 °F (36.6 °C)-98.8 °F (37.1 °C)] 98.8 °F (37.1 °C)  Pulse:  [73-92] 80  Resp:  [10-19] 16  SpO2:  [80 %-97 %] 92 %  BP: (112-153)/(52-91) 126/70     Weight: 136.1 kg (300 lb)  Body mass index is 41.84 kg/m².     Physical Exam  Vitals and nursing note reviewed.   Constitutional:       Appearance: He is obese.   HENT:      Head: Normocephalic.      Mouth/Throat:      Mouth: Mucous membranes are moist.   Eyes:      Extraocular Movements: Extraocular movements intact.   Neck:      Vascular: No carotid bruit.   Cardiovascular:      Rate and Rhythm: Normal rate and regular rhythm.      Pulses: Normal pulses.      Heart sounds: Normal heart sounds. No murmur heard.  Pulmonary:      Effort: Pulmonary effort is normal. No respiratory distress.      Breath sounds: Normal  breath sounds.   Abdominal:      General: Abdomen is flat. Bowel sounds are normal. There is no distension.      Palpations: Abdomen is soft.   Musculoskeletal:      Cervical back: Normal range of motion and neck supple. No tenderness.      Comments: Right hip immobilized   Skin:     General: Skin is warm and dry.   Neurological:      General: No focal deficit present.      Mental Status: He is alert and oriented to person, place, and time.   Psychiatric:         Judgment: Judgment normal.                Significant Labs: All pertinent labs within the past 24 hours have been reviewed.      Significant Imaging: I have reviewed all pertinent imaging results/findings within the past 24 hours.

## 2024-09-30 NOTE — PT/OT/SLP EVAL
Occupational Therapy   Evaluation    Name: Ant Webber  MRN: [id number]  Admitting Diagnosis: Osteoarthritis of right hip  Recent Surgery: Procedure(s) (LRB):  ROBOTIC ARTHROPLASTY,HIP (Right) Day of Surgery    Recommendations:     Discharge Recommendations: Low Intensity Therapy  Discharge Equipment Recommendations:  none  Barriers to discharge:  None    Assessment:     Ant Webber is a 63 y.o. male with a medical diagnosis of Osteoarthritis of right hip.  He presents with s//p right THR on 9/30/24. Performance deficits affecting function: impaired self care skills, impaired functional mobility, gait instability, impaired balance, orthopedic precautions.      Rehab Prognosis: Good; patient would benefit from acute skilled OT services to address these deficits and reach maximum level of function.       Plan:     Patient to be seen 5 x/week to address the above listed problems via self-care/home management, therapeutic activities, therapeutic exercises  Plan of Care Expires: 10/07/24  Plan of Care Reviewed with: patient    Subjective     Chief Complaint: s/p right THR  Patient/Family Comments/goals: pt agreeable to OT eval    Occupational Profile:  Living Environment: pt lives alone in one story home with no steps to enter  Previous level of function: independent with all ADL tasks, IADL tasks, and functional mobility   Roles and Routines: perform self care  Equipment Used at Home: walker, rolling, hip kit  Assistance upon Discharge: home with home health    Pain/Comfort:  Pain Rating 1: 6/10  Location - Side 1: Right  Location 1: hip  Pain Addressed 1: Pre-medicate for activity    Patients cultural, spiritual, Adventism conflicts given the current situation: no    Objective:     Communicated with: RN prior to session.  Patient found HOB elevated with blood pressure cuff, oxygen, peripheral IV, hip abduction pillow, pulse ox (continuous) upon OT entry to room.    General Precautions: Standard, fall  Orthopedic  Precautions: RLE weight bearing as tolerated, RLE posterior precautions  Braces: N/A  Respiratory Status: Nasal cannula, flow 3 L/min    Occupational Performance:    Bed Mobility:    Patient completed Supine to Sit with minimum assistance  Patient completed Sit to Supine with minimum assistance    Functional Mobility/Transfers:  Patient completed Sit <> Stand Transfer with minimum assistance  with  rolling walker   Functional Mobility: pt performed functional mobility into hallway and back to bed with CGA with RW    Activities of Daily Living:  Lower Body Dressing: maximal assistance to amrit socks    Cognitive/Visual Perceptual:  Cognitive/Psychosocial Skills:     -       Oriented to: Person, Place, Time, and Situation   -       Follows Commands/attention:Follows multistep  commands  -       Mood/Affect/Coping skills/emotional control: Cooperative    Physical Exam:  Balance:    -       sitting balance SBA; standing balance CGA with RW  Upper Extremity Range of Motion:     -       Right Upper Extremity: WFL  -       Left Upper Extremity: WFL  Upper Extremity Strength:    -       Right Upper Extremity: WFL  -       Left Upper Extremity: WFL  Gross motor coordination:   WFL    AMPAC 6 Click ADL:  AMPAC Total Score: 22    Treatment & Education:  Pt educated on OT role/POC.   Importance of OOB activity with staff assistance.  Importance of sitting up in the chair throughout the day as tolerated, especially for meals   Safety during functional t/f and mobility with use of RW  Importance of assisting with self-care activities   All questions/concerns answered within OT scope of practice      Patient left HOB elevated with all lines intact, call button in reach, and daughter present    GOALS:   Multidisciplinary Problems       Occupational Therapy Goals          Problem: Occupational Therapy    Goal Priority Disciplines Outcome Interventions   Occupational Therapy Goal     OT, PT/OT Progressing    Description: ST.Pt  will perform bathing with Kisha with setup at EOB  2.Pt will perform UE dressing with Elvin  3.Pt will perform LE dressing with Kisha with adaptive equipment  4.Pt will transfer bed/chair/bsc with CGA with RW  5.Pt will perform standing task x 2 min with CGA with RW  6.Tolerate 15 min of tx without fatigue.      LTG:   Restore to max I with selfcare and mobility.                           History:     Past Medical History:   Diagnosis Date    Diabetes     Diarrhea 08/03/2023    Hypertension     Lumbar post-laminectomy syndrome     Lumbosacral radiculopathy     Sleep apnea     Transient ischemic attack (TIA) 2024         Past Surgical History:   Procedure Laterality Date    BACK SURGERY  2013    Bilateral L4-L5 TFESI Bilateral 10-, 8-2-2017, 6-    Dr Simms    Bilateral L5-S1 TFESI Bilateral 05/15/2019    Dr Simms    CHOLECYSTOTOMY      EPIDURAL STEROID INJECTION N/A 2/13/2024    Procedure: Injection, Steroid, Epidural, L4/5;  Surgeon: Nani Simms MD;  Location: [facility];  Service: Pain Management;  Laterality: N/A;    HAND SURGERY  2003    HIP ARTHROSCOPY W/ LABRAL DEBRIDEMENT  2020    L5-S1 SUSAN  02/27/2019    Dr Simms    Left L4-L5 TFESI Left 8-, 7-, 6-8-2016    Dr Simms    SELECTIVE INJECTION OF ANESTHETIC AGENT AROUND LUMBAR SPINAL NERVE ROOT BY TRANSFORAMINAL APPROACH Left 9/16/2021    Procedure: Left L5-S1 TFESI;  Surgeon: Nani Simms MD;  Location: [facility];  Service: Pain Management;  Laterality: Left;  NO VAC   WILL BE TESTED    RECENTLY HAD COVID    TONSILLECTOMY      UMBILICAL HERNIA REPAIR         Time Tracking:     OT Date of Treatment: 09/30/24  OT Start Time: 1420  OT Stop Time: 1435  OT Total Time (min): 15 min    Billable Minutes:Evaluation OT min complexity eval    9/30/2024

## 2024-09-30 NOTE — ANESTHESIA PREPROCEDURE EVALUATION
09/30/2024  Ant Webber is a 63 y.o., male.      Pre-op Assessment    I have reviewed the Patient Summary Reports.    I have reviewed the NPO Status.   I have reviewed the Medications.     Review of Systems         Anesthesia Plan  Type of Anesthesia, risks & benefits discussed:    Anesthesia Type: Gen Supraglottic Airway  Intra-op Monitoring Plan: Standard ASA Monitors  Post Op Pain Control Plan: IV/PO Opioids PRN  Induction:  IV  Informed Consent: Informed consent signed with the Patient and all parties understand the risks and agree with anesthesia plan.  All questions answered.   ASA Score: 3    Ready For Surgery From Anesthesia Perspective.     .  NPO greater than 8 hours  No anesthetic complications  NKDA    Hct 51    H/o TIA  NIDDM  GERD  H/o СЕРГЕЙ . . . Had the Inspire procedure and СЕРГЕЙ improved    MP II; adequate ROM at neck

## 2024-09-30 NOTE — ANESTHESIA PROCEDURE NOTES
Intubation    Date/Time: 9/30/2024 8:32 AM    Performed by: Jareth Miramontes CRNA  Authorized by: Rigo Montiel MD    Intubation:     Induction:  Intravenous    Intubated:  Postinduction    Mask Ventilation:  Easy mask    Attempts:  1    Attempted By:  CRNA    Method of Intubation:  Video laryngoscopy    Blade:  Glidescope 4    Laryngeal View Grade: Grade I - full view of cords      Difficult Airway Encountered?: No      Complications:  None    Airway Device:  Oral endotracheal tube    Airway Device Size:  7.5    Style/Cuff Inflation:  Cuffed (inflated to minimal occlusive pressure)    Tube secured:  22    Secured at:  The lips    Placement Verified By:  Capnometry and Revisualization with laryngoscopy    Complicating Factors:  None    Findings Post-Intubation:  BS equal bilateral and atraumatic/condition of teeth unchanged

## 2024-09-30 NOTE — HPI
64 yo male with pmh of osteoarthritis of right hip sp right hip arthroplasty is being admitted for observation overnight after procedure today. Complains of expected post op pain but otherwise no complaints. Medications have been reviewed.  working on home health with appropriate needs for possible discharge tomorrow.

## 2024-09-30 NOTE — PLAN OF CARE
[facility] - Orthopedic Periop Services  Initial Discharge Assessment       Primary Care Provider: Bridgett Johnson MD    Admission Diagnosis: Osteoarthritis of right hip, unspecified osteoarthritis type [M16.11]  Osteoarthritis of right hip [M16.11]    Admission Date: 9/30/2024  Expected Discharge Date:     Transition of Care Barriers: None    Payor: [employer] / Plan: [facility] CC[place] [employer] / Product Type: Government /     Extended Emergency Contact Information  Primary Emergency Contact: RUBIO PROCTORIAN  Mobile Phone: [phone]  Relation: Other   needed? No  Secondary Emergency Contact: Cryer,Amber   [place]  Mobile Phone: [phone]  Relation: Daughter    Discharge Plan A: Home with family, Home Health  Discharge Plan B: Home with family, Home Health      [facility] - [place], [place] - [address]  [address]  [place] [address]  Phone: [phone] Fax: [phone]    [facility] - [place], [place] - [address]  [address]  [place] [address]  Phone: [phone] Fax: [phone]      Initial Assessment (most recent)       Adult Discharge Assessment - 09/30/24 1336          Discharge Assessment    Assessment Type Discharge Planning Assessment     Source of Information family     People in Home alone     Do you expect to return to your current living situation? Yes     Do you have help at home or someone to help you manage your care at home? Yes     Who are your caregiver(s) and their phone number(s)? Jasmyne Boone- Daughter [phone]     Prior to hospitilization cognitive status: Unable to Assess     Current cognitive status: Unable to Assess     Walking or Climbing Stairs Difficulty yes     Mobility Management cane and rw     Dressing/Bathing Difficulty no     Home Accessibility stairs to enter home     Number of Stairs, Main Entrance one     Equipment Currently Used at Home cane, straight;walker, rolling;lift device;bedside  commode;wheelchair     Readmission within 30 days? No     Patient currently being followed by outpatient case management? No     Do you currently have service(s) that help you manage your care at home? No     Do you take prescription medications? Yes     Do you have prescription coverage? Yes     Coverage [facility]     Do you have any problems affording any of your prescribed medications? No     Is the patient taking medications as prescribed? yes     Who is going to help you get home at discharge? Amber Cryer- Daughter [phone]     How do you get to doctors appointments? car, drives self     Are you on dialysis? No     Do you take coumadin? No     Discharge Plan A Home with family;Home Health     Discharge Plan B Home with family;Home Health     DME Needed Upon Discharge  none     Discharge Plan discussed with: Adult children     Transition of Care Barriers None                   Pt in sx at time of initial dcp assessment. RALPH spoke with pts daughter, Joanna in room. Pt lives at home alone, has a bsc, cane, lift chair, rw and wc. The plan is for pt to dc home with [facility], choice obtained. RALPH faxed facesheet to Beatrice at [facility]. RALPH completed [facility] form for  and [facility] at the [facility]. SDOH questions completed 2 weeks ago. RALPH will cont to follow for dc needs.

## 2024-09-30 NOTE — PLAN OF CARE
Problem: Occupational Therapy  Goal: Occupational Therapy Goal  Description: ST.Pt will perform bathing with Ksiha with setup at EOB  2.Pt will perform UE dressing with Elvin  3.Pt will perform LE dressing with Kisha with adaptive equipment  4.Pt will transfer bed/chair/bsc with CGA with RW  5.Pt will perform standing task x 2 min with CGA with RW  6.Tolerate 15 min of tx without fatigue.      LTG:   Restore to max I with selfcare and mobility.      Outcome: Progressing

## 2024-09-30 NOTE — TRANSFER OF CARE
"Anesthesia Transfer of Care Note    Patient: Ant Webber    Procedure(s) Performed: Procedure(s) (LRB):  ROBOTIC ARTHROPLASTY,HIP (Right)    Patient location: PACU    Anesthesia Type: general    Transport from OR: Transported from OR on 6-10 L/min O2 by face mask with adequate spontaneous ventilation    Post pain: adequate analgesia    Post assessment: no apparent anesthetic complications    Post vital signs: stable    Level of consciousness: responds to stimulation, awake and sedated    Nausea/Vomiting: no nausea/vomiting    Complications: none    Transfer of care protocol was followed    Last vitals: Visit Vitals  /67 (BP Location: Right arm, Patient Position: Lying)   Pulse 89   Temp 37.1 °C (98.8 °F) (Oral)   Resp 16   Ht 5' 11" (1.803 m)   Wt 136.1 kg (300 lb)   SpO2 (!) 92%   BMI 41.84 kg/m²     "

## 2024-09-30 NOTE — OP NOTE
[facility]  OPERATIVE REPORT   ORTHOPAEDIC SURGERY   PROVIDER: DR. Herson Mcintosh    PATIENT INFORMATION   Ant Webber 63 y.o. male 1960   MRN: [id number]       DATE OF PROCEDURE: 9/30/2024     PREOPERATIVE DIAGNOSES:   Osteoarthritis of right hip, unspecified osteoarthritis type [M16.11]    POSTOPERATIVE DIAGNOSES:   Osteoarthritis of right hip, unspecified osteoarthritis type [M16.11]    PROCEDURES PERFORMED:   Procedure(s) (LRB):  ROBOTIC ARTHROPLASTY,HIP (Right)    Surgeons and Role:     * Herson Mcintosh MD - Primary    ANESTHESIA: General + local block    ESTIMATED BLOOD LOSS: less than 50 mL    IMPLANTS:   Implant Name Type Inv. Item Serial No.  Lot No. LRB No. Used Action   PIN BONE 4 X 140MM STERILE - [id number]  PIN BONE 4 X 140MM STERILE  [employer]  Right 1 Implanted and Explanted   PIN BONE 4 X 140MM STERILE - [id number]  PIN BONE 4 X 140MM STERILE  [employer]  Right 1 Implanted and Explanted   LINER ACET SZ E 42MM COCR - [id number]  LINER ACET SZ E 42MM COCR  [employer]. [id number] Right 1 Implanted   SCREW TRIDENT II LP HEX 6.5X35 - [id number]  SCREW TRIDENT II LP HEX 6.5X35  [employer]. [id number] Right 1 Implanted   SHELL TRIDENT II ACET E 54MM - [id number]  SHELL TRIDENT II ACET E 54MM  [employer]. [id number] Right 1 Implanted   INSERT AMD/MDM X3 48MM - [id number]  INSERT AMD/MDM X3 48MM  [employer]. [id number] Right 1 Implanted   STEM ACC II 27 DEG SZ 5 - [id number]  STEM ACC II 27 DEG SZ 5  [employer]. [id number] Right 1 Implanted   HEAD FEMORAL V40 TAPER +4X28MM - [id number]  HEAD FEMORAL V40 TAPER +4X28MM  [employer]. [id number] Right 1 Implanted        FINDINGS: CAM deformity Rt hip, severe labral tearing, moderate hip osteoarthritis     SPECIMENS:   Specimen (24h ago, onward)      None            COMPLICATIONS:  None.     INTRAOPERATIVE COUNTS: Correct.     PROPHYLACTIC IV ANTIBIOTICS: Given per  Protocol.    INDICATIONS FOR PROCEDURE:  The patient is a 63 y.o. male with right  hip osteoarthritis Treatment options were discussed and total hip arthroplasty was elected..  Risks and complications were discussed including, but not limited to the risks of anesthetic complications, infections, wound healing complications, aseptic loosening, instability, DVT, pulmonary embolism and death among others.    DESCRIPTION OF PROCEDURE:  The patient was taken to the Operating Room where anesthesia was administered by the Anesthesia Department. He was then placed in the lateral decubitus position.The right hip and lower extremity were then sterilely prepped and draped in the normal fashion. Preoperative antibiotics were given.  1 g of TXA was given prior to the incision.      3 pins for the array were placed percutaneously at the iliac crest. Care was taken when placing these not to damage any soft tissue. We were very satisfied with   their position and fixation.  The array was completely assembled at this point.  A nonsterile EKG lead had been placed on the patella prior to prepping, and this was used as a reference point during the case.     A 15 cm curvilinear incision was made from the base of the greater trochanter extending proximally and posteriorly.  Subcutaneous tissue was dissected with electrocautery down to the deep fascia, which was incised along the line of the incision.  The gluteus fannie was then split along the line of its fibers.  The abductor musculature was retracted anteriorly. The piriformis was tagged for later repair with a #1 Vicryl and reflected from its insertion on the greater trochanter. An array was placed on the greater trochanter.  Leg lengths were established via the robotic navigation.  The other short external rotators were then released in an L shaped fashion. to reveal the underlying hip capsule.  A  "capsulotomy was made along the base of the femoral neck and extended towards the acetabulum slightly "L" fashion.  The femoral head was dislocated at this point.      Femoral neck cut was made. Femoral head was removed. The acetabulum was then exposed with the use of retractors. The reflected head of the rectus and anterior capsule was released.   The sciatic nerve was palpated; it was out of harm's way. Excellent exposure of the acetabulum was obtained, and the labrum was excised. At this point, the   Then the acetabular checkpoint was placed. At this point, we were satisfied with our setup. We confirmed the position of the checkpoint and then registered our acetabular anatomy and landmarks. Once this was accomplished, we brought the ELIE into the field and with a goal of 45  degrees of abduction and 20 degrees of  anteversion, we reamed the acetabulum. We were very satisfied with the reaming. The reamer was then removed.  We then used the cup  with the ELIE and impacted the cup.   Position was confirmed at 45 of abduction and 20 of anteversion. We did augment with one 6.5 bone screw and then the MDM liner was firmly seated.  All acetabular retractors were raised and checkpoints were removed.       We then turned our attention towards proximal femoral preparation.  A box chisel was used to gain access to intramedullary canal followed by straight reamer, lateralizing reamer and then further enlarging straight reamers. Appropriate sized broaches were placed.  The selected broach had excellent fit. A calcar planer was then used to plane the calcar.  The head was applied and the hip was reduced.  Excellent stability was noted throughout the range of motion and leg lengths were satisfactory.    Trial components were then removed.  The press fit stem was then impacted and excellent stability was noted.  The head was then applied and the hip was again reduced.  Again, excellent stability and satisfactory leg lengths " were noted.    Wounds once again thoroughly irrigated.  The capsule and short external rotators were repaired with  #2 Fiberwire after the creation of two bone tunnels in the greater trochanter.  This served to anatomically reduce the short external rotators. The gluteal fascia and fascia tish were then closed with interrupted figure-of-eight sutures of #1 Vicryl.  The deep subcutaneous tissue was closed with a running stitch of 0 Vicryl.  Superficial subcutaneous tissue was closed with interrupted inverted sutures of #2-0 Vicryl.  Skin was approximated using skin staples.  Sterile dressing was applied.  Anesthesia was reversed and she was returned to the Postanesthesia Care Unit in stable condition.    Recommend Posterior Hip Precautions

## 2024-09-30 NOTE — PT/OT/SLP EVAL
Physical Therapy Evaluation    Patient Name:  Ant Webber   MRN:  [id number]    Recommendations:     Discharge Recommendations: Low Intensity Therapy   Discharge Equipment Recommendations: none   Barriers to discharge: Decreased caregiver support    Assessment:     Ant Webber is a 63 y.o. male admitted with a medical diagnosis of Osteoarthritis of right hip.  He presents with the following impairments/functional limitations: decreased ROM, gait instability, orthopedic precautions, impaired functional mobility, pain Patient with normal post op pain and weakness. Plan is home tomorrow. Daughters will be there to assist. Has all needed equipment.    Rehab Prognosis: Good; patient would benefit from acute skilled PT services to address these deficits and reach maximum level of function.    Recent Surgery: Procedure(s) (LRB):  ROBOTIC ARTHROPLASTY,HIP (Right) Day of Surgery    Plan:     During this hospitalization, patient to be seen BID to address the identified rehab impairments via gait training, therapeutic activities, therapeutic exercises and progress toward the following goals:    Plan of Care Expires:  10/30/24    Subjective     Chief Complaint: post op pain  Patient/Family Comments/goals: plan is dc home tomorrow with daughters to assist  Pain/Comfort:  Pain Rating 1: 6/10  Location - Side 1: Right  Location 1: hip  Pain Addressed 1: Cessation of Activity, Pre-medicate for activity    Patients cultural, spiritual, Nondenominational conflicts given the current situation: no    Living Environment:  Lives alone, single level house  Prior to admission, patients level of function was independent.  Equipment used at home: walker, rolling, wheelchair.  DME owned (not currently used): rolling walker.  Upon discharge, patient will have assistance from daughters.    Objective:     Communicated with nurse prior to session.  Patient found supine with SCD, peripheral IV, pulse ox (continuous), blood pressure cuff, hip abduction  pillow  upon PT entry to room.    General Precautions: Standard,    Orthopedic Precautions:RLE weight bearing as tolerated   Braces:    Respiratory Status: Room air    Exams:  Sensation:    -       Intact  RLE ROM: WFL  RLE Strength: 3/5  LLE ROM: WNL  LLE Strength: WNL    Functional Mobility:  Bed Mobility:     Supine to Sit: minimum assistance  Sit to Supine: minimum assistance  Transfers:     Sit to Stand:  minimum assistance with rolling walker  Gait: ambulated 30 feet with walker wbat, step to gait      AM-PAC 6 CLICK MOBILITY  Total Score:18       Treatment & Education:  Hip precautions    Patient left supine with call button in reach.    GOALS:   Multidisciplinary Problems       Physical Therapy Goals          Problem: Physical Therapy    Goal Priority Disciplines Outcome Interventions   Physical Therapy Goal     PT, PT/OT Progressing    Description: Short Term Goals  Independent with HEP  Independent with walkerx 100 feet FWB/WBAT: right lower extremity  Independent with HEP/precautions    Long term goals  Needed equipment for home.                            History:     Past Medical History:   Diagnosis Date    Diabetes     Diarrhea 08/03/2023    Hypertension     Lumbar post-laminectomy syndrome     Lumbosacral radiculopathy     Sleep apnea     Transient ischemic attack (TIA) 2024       Past Surgical History:   Procedure Laterality Date    BACK SURGERY  2013    Bilateral L4-L5 TFESI Bilateral 10-, 8-2-2017, 6-    Dr Simms    Bilateral L5-S1 TFESI Bilateral 05/15/2019    Dr Simms    CHOLECYSTOTOMY      EPIDURAL STEROID INJECTION N/A 2/13/2024    Procedure: Injection, Steroid, Epidural, L4/5;  Surgeon: Nani Simms MD;  Location: [facility];  Service: Pain Management;  Laterality: N/A;    HAND SURGERY  2003    HIP ARTHROSCOPY W/ LABRAL DEBRIDEMENT  2020    L5-S1 SUSAN  02/27/2019    Dr Simms    Left L4-L5 TFESI Left 8-, 7-, 6-8-2016    Dr Simms    SELECTIVE  INJECTION OF ANESTHETIC AGENT AROUND LUMBAR SPINAL NERVE ROOT BY TRANSFORAMINAL APPROACH Left 9/16/2021    Procedure: Left L5-S1 TFESI;  Surgeon: Nani Simms MD;  Location: [facility];  Service: Pain Management;  Laterality: Left;  NO VAC   WILL BE TESTED    RECENTLY HAD COVID    TONSILLECTOMY      UMBILICAL HERNIA REPAIR         Time Tracking:     PT Received On: 09/30/24  PT Start Time: 1315     PT Stop Time: 1335  PT Total Time (min): 20 min     Billable Minutes: Evaluation 20 09/30/2024

## 2024-10-01 VITALS
OXYGEN SATURATION: 94 % | TEMPERATURE: 99 F | BODY MASS INDEX: 42.01 KG/M2 | HEIGHT: 71 IN | HEART RATE: 77 BPM | WEIGHT: 300.06 LBS | RESPIRATION RATE: 18 BRPM | DIASTOLIC BLOOD PRESSURE: 84 MMHG | SYSTOLIC BLOOD PRESSURE: 139 MMHG

## 2024-10-01 LAB
ANION GAP SERPL CALCULATED.3IONS-SCNC: 8 MMOL/L (ref 7–16)
BASOPHILS # BLD AUTO: 0.03 K/UL (ref 0–0.2)
BASOPHILS NFR BLD AUTO: 0.2 % (ref 0–1)
BUN SERPL-MCNC: 19 MG/DL (ref 7–18)
BUN/CREAT SERPL: 18 (ref 6–20)
CALCIUM SERPL-MCNC: 8.7 MG/DL (ref 8.5–10.1)
CHLORIDE SERPL-SCNC: 105 MMOL/L (ref 98–107)
CO2 SERPL-SCNC: 28 MMOL/L (ref 21–32)
CREAT SERPL-MCNC: 1.06 MG/DL (ref 0.7–1.3)
DIFFERENTIAL METHOD BLD: ABNORMAL
EGFR (NO RACE VARIABLE) (RUSH/TITUS): 79 ML/MIN/1.73M2
EOSINOPHIL # BLD AUTO: 0 K/UL (ref 0–0.5)
EOSINOPHIL NFR BLD AUTO: 0 % (ref 1–4)
ERYTHROCYTE [DISTWIDTH] IN BLOOD BY AUTOMATED COUNT: 15.4 % (ref 11.5–14.5)
GLUCOSE SERPL-MCNC: 166 MG/DL (ref 74–106)
HCT VFR BLD AUTO: 41.5 % (ref 40–54)
HGB BLD-MCNC: 12.9 G/DL (ref 13.5–18)
IMM GRANULOCYTES # BLD AUTO: 0.13 K/UL (ref 0–0.04)
IMM GRANULOCYTES NFR BLD: 0.7 % (ref 0–0.4)
LYMPHOCYTES # BLD AUTO: 0.52 K/UL (ref 1–4.8)
LYMPHOCYTES NFR BLD AUTO: 2.8 % (ref 27–41)
MCH RBC QN AUTO: 27.2 PG (ref 27–31)
MCHC RBC AUTO-ENTMCNC: 31.1 G/DL (ref 32–36)
MCV RBC AUTO: 87.4 FL (ref 80–96)
MONOCYTES # BLD AUTO: 1.11 K/UL (ref 0–0.8)
MONOCYTES NFR BLD AUTO: 5.9 % (ref 2–6)
MPC BLD CALC-MCNC: 9.6 FL (ref 9.4–12.4)
NEUTROPHILS # BLD AUTO: 16.98 K/UL (ref 1.8–7.7)
NEUTROPHILS NFR BLD AUTO: 90.4 % (ref 53–65)
NRBC # BLD AUTO: 0 X10E3/UL
NRBC, AUTO (.00): 0 %
PLATELET # BLD AUTO: 288 K/UL (ref 150–400)
POTASSIUM SERPL-SCNC: 4.9 MMOL/L (ref 3.5–5.1)
RBC # BLD AUTO: 4.75 M/UL (ref 4.6–6.2)
SODIUM SERPL-SCNC: 136 MMOL/L (ref 136–145)
WBC # BLD AUTO: 18.77 K/UL (ref 4.5–11)

## 2024-10-01 PROCEDURE — 97116 GAIT TRAINING THERAPY: CPT

## 2024-10-01 PROCEDURE — 80048 BASIC METABOLIC PNL TOTAL CA: CPT

## 2024-10-01 PROCEDURE — 97535 SELF CARE MNGMENT TRAINING: CPT

## 2024-10-01 PROCEDURE — 36415 COLL VENOUS BLD VENIPUNCTURE: CPT

## 2024-10-01 PROCEDURE — 96365 THER/PROPH/DIAG IV INF INIT: CPT

## 2024-10-01 PROCEDURE — G0378 HOSPITAL OBSERVATION PER HR: HCPCS

## 2024-10-01 PROCEDURE — 94761 N-INVAS EAR/PLS OXIMETRY MLT: CPT

## 2024-10-01 PROCEDURE — 63600175 PHARM REV CODE 636 W HCPCS

## 2024-10-01 PROCEDURE — 96366 THER/PROPH/DIAG IV INF ADDON: CPT

## 2024-10-01 PROCEDURE — 97110 THERAPEUTIC EXERCISES: CPT

## 2024-10-01 PROCEDURE — 25000003 PHARM REV CODE 250

## 2024-10-01 PROCEDURE — 85025 COMPLETE CBC W/AUTO DIFF WBC: CPT

## 2024-10-01 RX ORDER — DOCUSATE SODIUM 100 MG/1
100 CAPSULE, LIQUID FILLED ORAL EVERY 12 HOURS
Status: DISCONTINUED | OUTPATIENT
Start: 2024-10-01 | End: 2024-10-01 | Stop reason: HOSPADM

## 2024-10-01 RX ORDER — ASPIRIN 325 MG
325 TABLET, DELAYED RELEASE (ENTERIC COATED) ORAL DAILY
Qty: 30 TABLET | Refills: 0 | Status: SHIPPED | OUTPATIENT
Start: 2024-10-01 | End: 2025-10-01

## 2024-10-01 RX ORDER — AMOXICILLIN 250 MG
1 CAPSULE ORAL 2 TIMES DAILY
Qty: 60 TABLET | Refills: 2 | Status: SHIPPED | OUTPATIENT
Start: 2024-10-01

## 2024-10-01 RX ORDER — ONDANSETRON 4 MG/1
8 TABLET, ORALLY DISINTEGRATING ORAL EVERY 8 HOURS PRN
Qty: 30 TABLET | Refills: 0 | Status: SHIPPED | OUTPATIENT
Start: 2024-10-01

## 2024-10-01 RX ORDER — OXYCODONE AND ACETAMINOPHEN 10; 325 MG/1; MG/1
1 TABLET ORAL EVERY 6 HOURS PRN
Qty: 30 TABLET | Refills: 0 | Status: SHIPPED | OUTPATIENT
Start: 2024-10-01

## 2024-10-01 RX ORDER — OXYCODONE HYDROCHLORIDE 5 MG/1
10 TABLET ORAL EVERY 4 HOURS PRN
Status: DISCONTINUED | OUTPATIENT
Start: 2024-10-01 | End: 2024-10-01 | Stop reason: HOSPADM

## 2024-10-01 RX ORDER — CELECOXIB 200 MG/1
200 CAPSULE ORAL 2 TIMES DAILY
Qty: 60 CAPSULE | Refills: 2 | Status: SHIPPED | OUTPATIENT
Start: 2024-10-01

## 2024-10-01 RX ADMIN — OXYCODONE 10 MG: 5 TABLET ORAL at 09:10

## 2024-10-01 RX ADMIN — OXYCODONE 10 MG: 5 TABLET ORAL at 03:10

## 2024-10-01 RX ADMIN — CEFAZOLIN 2 G: 2 INJECTION, POWDER, FOR SOLUTION INTRAMUSCULAR; INTRAVENOUS at 01:10

## 2024-10-01 RX ADMIN — CELECOXIB 200 MG: 100 CAPSULE ORAL at 09:10

## 2024-10-01 RX ADMIN — MUPIROCIN 1 G: 20 OINTMENT TOPICAL at 09:10

## 2024-10-01 RX ADMIN — AMLODIPINE BESYLATE 2.5 MG: 2.5 TABLET ORAL at 09:10

## 2024-10-01 RX ADMIN — PREGABALIN 75 MG: 75 CAPSULE ORAL at 09:10

## 2024-10-01 RX ADMIN — DOCUSATE SODIUM 100 MG: 100 CAPSULE, LIQUID FILLED ORAL at 09:10

## 2024-10-01 RX ADMIN — PANTOPRAZOLE SODIUM 40 MG: 40 TABLET, DELAYED RELEASE ORAL at 09:10

## 2024-10-01 RX ADMIN — FAMOTIDINE 20 MG: 20 TABLET, FILM COATED ORAL at 09:10

## 2024-10-01 RX ADMIN — ACETAMINOPHEN 1000 MG: 500 TABLET ORAL at 05:10

## 2024-10-01 RX ADMIN — ASPIRIN 81 MG CHEWABLE TABLET 324 MG: 81 TABLET CHEWABLE at 09:10

## 2024-10-01 NOTE — PT/OT/SLP PROGRESS
Physical Therapy Treatment    Patient Name:  Ant Webber   MRN:  [id number]    Recommendations:     Discharge Recommendations: Low Intensity Therapy  Discharge Equipment Recommendations: none  Barriers to discharge: None    Assessment:     Ant Webber is a 63 y.o. male admitted with a medical diagnosis of Osteoarthritis of right hip.  He presents with the following impairments/functional limitations: decreased ROM, gait instability, orthopedic precautions, impaired functional mobility, pain Patient with good pain control and mobility. Okay for home with family to assist.    Rehab Prognosis: Good; patient would benefit from acute skilled PT services to address these deficits and reach maximum level of function.    Recent Surgery: Procedure(s) (LRB):  ROBOTIC ARTHROPLASTY,HIP (Right) 1 Day Post-Op    Plan:     During this hospitalization, patient to be seen BID to address the identified rehab impairments via gait training, therapeutic activities, therapeutic exercises and progress toward the following goals:    Plan of Care Expires:  10/30/24    Subjective     Chief Complaint: post op pain  Patient/Family Comments/goals: plan is dc home today  Pain/Comfort:  Pain Rating 1: 6/10  Location - Side 1: Right  Location 1: hip  Pain Addressed 1: Cessation of Activity, Pre-medicate for activity      Objective:     Communicated with nurse prior to session.  Patient found supine with SCD, peripheral IV, pulse ox (continuous), blood pressure cuff, hip abduction pillow upon PT entry to room.     General Precautions: Standard,    Orthopedic Precautions: RLE weight bearing as tolerated  Braces:    Respiratory Status: Room air     Functional Mobility:  Transfers:     Sit to Stand:  contact guard assistance with rolling walker  Gait: ambulated 90 feet with rolling walker cga      AM-PAC 6 CLICK MOBILITY  Turning over in bed (including adjusting bedclothes, sheets and blankets)?: 3  Sitting down on and standing up from a chair with  arms (e.g., wheelchair, bedside commode, etc.): 3  Moving from lying on back to sitting on the side of the bed?: 3  Need to walk in hospital room?: 3  Climbing 3-5 steps with a railing?: 3       Treatment & Education:  RLE: aps, qs, saq, abd-add, hs 3x10 each  Reviewed precautions  Wbat with walker    Patient left sitting with daughter in room..    GOALS:   Multidisciplinary Problems       Physical Therapy Goals          Problem: Physical Therapy    Goal Priority Disciplines Outcome Interventions   Physical Therapy Goal     PT, PT/OT Progressing    Description: Short Term Goals  Independent with HEP  Independent with walkerx 100 feet FWB/WBAT: right lower extremity  Independent with HEP/precautions    Long term goals  Needed equipment for home.                            Time Tracking:     PT Received On: 10/01/24  PT Start Time: 1045     PT Stop Time: 1110  PT Total Time (min): 25 min     Billable Minutes: Gait Training 10 and Therapeutic Exercise 15    Treatment Type: Treatment  PT/PTA: PT     Number of PTA visits since last PT visit: 0     10/01/2024

## 2024-10-01 NOTE — CONSULTS
[facility] Hospital Medicine  Consult Note    Patient Name: Ant Webber  MRN: [id number]  Admission Date: 9/30/2024  Hospital Length of Stay: 0 days  Attending Physician: Herson Mcintosh MD   Primary Care Provider: Bridgett Johnson MD           Patient information was obtained from patient and ER records.     Inpatient consult to Hospitalist  Consult performed by: Soledad Bolden MD  Consult ordered by: Soledad Bolden MD        Subjective:     Principal Problem: Osteoarthritis of right hip    Chief Complaint: No chief complaint on file.       HPI: 62 yo male with pmh of osteoarthritis of right hip sp right hip arthroplasty is being admitted for observation overnight after procedure today. Complains of expected post op pain but otherwise no complaints. Medications have been reviewed.  working on home health with appropriate needs for possible discharge tomorrow.           Review of patient's allergies indicates:  No Known Allergies    No current facility-administered medications on file prior to encounter.     Current Outpatient Medications on File Prior to Encounter   Medication Sig    amLODIPine (NORVASC) 2.5 MG tablet Take 2.5 mg by mouth once daily.    cetirizine (ZYRTEC) 10 MG tablet Take 10 mg by mouth.    cyanocobalamin (VITAMIN B-12) 1000 MCG tablet 1,000 mcg.    diclofenac sodium (VOLTAREN) 1 % Gel APPLY 2 GRAMS TO AFFECTED AREA(S) 2 TIMES A DAY AS NEEDED FOR PAIN AND INFLAMMATION    pantoprazole (PROTONIX) 40 MG tablet Take 1 tablet (40 mg total) by mouth once daily.    pregabalin (LYRICA) 100 MG capsule TAKE ONE CAPSULE BY MOUTH THREE TIMES DAILY    tiZANidine 4 mg Cap Take 4 mg by mouth every 8 (eight) hours as needed (Spasm).    [DISCONTINUED] aspirin (ECOTRIN) 81 MG EC tablet 81 mg.    [DISCONTINUED] meloxicam (MOBIC) 7.5 MG tablet Take 1 tablet (7.5 mg total) by mouth once daily.    [DISCONTINUED] methylPREDNISolone (MEDROL DOSEPACK) 4 mg tablet use  as directed    atorvastatin (LIPITOR) 20 MG tablet TAKE ONE-HALF TABLET BY MOUTH DAILY FOR CHOLESTEROL. STOP MEDICATION AND CALL PROVIDER FOR ANY UNEXPLAINED MUSCLE ACHES,PAIN OR WEAKNESS     Family History       Problem Relation (Age of Onset)    Diabetes Father    Hypertension Mother    Parkinsonism Mother, Father          Tobacco Use    Smoking status: Former     Current packs/day: 0.00     Types: Cigarettes     Quit date:      Years since quittin.7    Smokeless tobacco: Former     Types: Snuff     Quit date:    Substance and Sexual Activity    Alcohol use: Yes     Comment: occasionally    Drug use: Never    Sexual activity: Not on file     Review of Systems   All other systems reviewed and are negative.    Objective:     Vital Signs (Most Recent):  Temp: 98.5 °F (36.9 °C) (10/01/24 0759)  Pulse: 77 (10/01/24 0759)  Resp: 18 (10/01/24 0759)  BP: 139/84 (10/01/24 0759)  SpO2: (!) 94 % (10/01/24 0759) Vital Signs (24h Range):  Temp:  [97.7 °F (36.5 °C)-98.8 °F (37.1 °C)] 98.5 °F (36.9 °C)  Pulse:  [71-97] 77  Resp:  [10-19] 18  SpO2:  [80 %-96 %] 94 %  BP: (110-154)/() 139/84     Weight: (!) 136.1 kg (300 lb 0.7 oz)  Body mass index is 41.85 kg/m².     Physical Exam  Vitals and nursing note reviewed.   Constitutional:       Appearance: He is obese.   HENT:      Head: Normocephalic.      Mouth/Throat:      Mouth: Mucous membranes are moist.   Eyes:      Extraocular Movements: Extraocular movements intact.   Neck:      Vascular: No carotid bruit.   Cardiovascular:      Rate and Rhythm: Normal rate and regular rhythm.      Pulses: Normal pulses.      Heart sounds: Normal heart sounds. No murmur heard.  Pulmonary:      Effort: Pulmonary effort is normal. No respiratory distress.      Breath sounds: Normal breath sounds.   Abdominal:      General: Abdomen is flat. Bowel sounds are normal. There is no distension.      Palpations: Abdomen is soft.   Musculoskeletal:      Cervical back: Normal range of  motion and neck supple. No tenderness.      Comments: Right hip immobilized   Skin:     General: Skin is warm and dry.   Neurological:      General: No focal deficit present.      Mental Status: He is alert and oriented to person, place, and time.   Psychiatric:         Judgment: Judgment normal.                Significant Labs: All pertinent labs within the past 24 hours have been reviewed.      Significant Imaging: I have reviewed all pertinent imaging results/findings within the past 24 hours.  Assessment/Plan:     * Osteoarthritis of right hip  Post op care now, admitted for observation, pain control    Status post total replacement of hip    - admit for observation, medications reviewed, PT/OT,       VTE Risk Mitigation (From admission, onward)           Ordered     IP VTE LOW RISK PATIENT  Once         10/01/24 0745     Place sequential compression device  Until discontinued         10/01/24 0745     Place BLAYNE hose  Until discontinued         09/30/24 1233     Place sequential compression device  Until discontinued         09/30/24 1233                        Thank you for your consult. I will follow-up with patient. Please contact us if you have any additional questions.    Soledad Bolden MD  Department of Hospital Medicine   [facility]

## 2024-10-01 NOTE — PT/OT/SLP PROGRESS
Occupational Therapy   Treatment    Name: Ant Webber  MRN: [id number]  Admitting Diagnosis:  Osteoarthritis of right hip  1 Day Post-Op    Recommendations:     Discharge Recommendations: Low Intensity Therapy  Discharge Equipment Recommendations:  none  Barriers to discharge:  None    Assessment:     Ant Webber is a 63 y.o. male with a medical diagnosis of Osteoarthritis of right hip.  He presents with s/p right THR on 9/30/24. Performance deficits affecting function are impaired self care skills, impaired functional mobility, gait instability, impaired balance, orthopedic precautions.     Rehab Prognosis:  Good; patient would benefit from acute skilled OT services to address these deficits and reach maximum level of function.       Plan:     Patient to be seen 5 x/week to address the above listed problems via self-care/home management, therapeutic activities, therapeutic exercises  Plan of Care Expires: 10/07/24  Plan of Care Reviewed with: patient    Subjective     Chief Complaint: s/p right THR  Patient/Family Comments/goals: pt agreeable to OT tx  Pain/Comfort:  Pain Rating 1: 5/10  Location - Side 1: Right  Location 1: hip  Pain Addressed 1: Pre-medicate for activity    Objective:     Communicated with: BRANDYN Pang prior to session.  Patient found HOB elevated with peripheral IV upon OT entry to room.    General Precautions: Standard, fall    Orthopedic Precautions:RLE weight bearing as tolerated, RLE posterior precautions  Braces: N/A  Respiratory Status: Room air     Occupational Performance:     Bed Mobility:    Patient completed Supine to Sit with minimum assistance     Functional Mobility/Transfers:  Patient completed Sit <> Stand Transfer with contact guard assistance  with  rolling walker   Patient completed Bed <> Chair Transfer using Step Transfer technique with contact guard assistance with rolling walker  Functional Mobility: pt performed steps to bedside chair with CGA with RW    Activities of  Daily Living:  Upper Body Dressing: modified independence to amrit shirt  Lower Body Dressing: minimum assistance to amrit shorts with adaptive equipment      AMPAC 6 Click ADL:      Treatment & Education:  Pt performed ADL training as listed above. Pt demonstrated good adherence to posterior hip precautions with LB dressing utilizing adaptive equipment    Patient left up in chair with all lines intact, call button in reach, BRANDYN Pang notified, and daughter present    GOALS:   Multidisciplinary Problems       Occupational Therapy Goals          Problem: Occupational Therapy    Goal Priority Disciplines Outcome Interventions   Occupational Therapy Goal     OT, PT/OT Progressing    Description: ST.Pt will perform bathing with Kisha with setup at EOB  2.Pt will perform UE dressing with Elvin  3.Pt will perform LE dressing with Kisha with adaptive equipment  4.Pt will transfer bed/chair/bsc with CGA with RW  5.Pt will perform standing task x 2 min with CGA with RW  6.Tolerate 15 min of tx without fatigue.      LTG:   Restore to max I with selfcare and mobility.                           Time Tracking:     OT Date of Treatment: 10/01/24  OT Start Time: 941  OT Stop Time: 1006  OT Total Time (min): 25 min    Billable Minutes:Self Care/Home Management 25 minutes    OT/MIKI: OT          10/1/2024

## 2024-10-01 NOTE — PROGRESS NOTES
Daily Orthopaedic Progress Note    Ant Webber is a 63 y.o. male admitted on 9/30/2024  Hospital Day: 0  Post Op Day: 1 Day Post-Op    Antibiotics (From admission, onward)      Start     Stop Route Frequency Ordered    09/30/24 1245  mupirocin 2 % ointment 1 g         10/05/24 0859 Nasl 2 times daily 09/30/24 1233             The patient was seen and examined this morning at the bedside. Patient reports no acute issues overnight and adequate control of pain on current regimen.  Patient worked with physical therapy over the last 24 hours.      PHYSICAL EXAM:  Awake/alert/oriented x3, No acute distress, Afebrile, Vital signs stable  Normocephalic, Atraumatic  Good inspiratory effort with unlaboured breathing  Dressings clean/dry/intact, Operative limb neurovascularly intact with 5/5 strength distally and sensation intact to light touch distally; some decreased sensation over the area of the regional block and palpable pulses  Vitals:    10/01/24 0357 10/01/24 0416 10/01/24 0720 10/01/24 0759   BP:  118/71  139/84   BP Location:       Patient Position:       Pulse:  71  77   Resp: 19 16     Temp:  97.7 °F (36.5 °C)  98.5 °F (36.9 °C)   TempSrc:  Oral  Oral   SpO2:  (!) 91% 96% (!) 94%   Weight:  (!) 136.1 kg (300 lb 0.7 oz)     Height:         I/O last 3 completed shifts:  In: 1525 [IV Piggyback:1525]  Out: 500 [Urine:450; Blood:50]    Significant Labs:  Recent Lab Results         10/01/24  0458   09/30/24  1711   09/30/24  1133        Anion Gap 8           Baso # 0.03           Basophil % 0.2           BUN 19           BUN/CREAT RATIO 18           Calcium 8.7           Chloride 105           CO2 28           Creatinine 1.06           Differential Method Auto           eGFR 79           Eos # 0.00           Eos % 0.0           Glucose 166           Hematocrit 41.5           Hemoglobin 12.9           Immature Grans (Abs) 0.13           Immature Granulocytes 0.7           Lymph # 0.52           Lymph % 2.8            MCH 27.2           MCHC 31.1           MCV 87.4           Mono # 1.11           Mono % 5.9           MPV 9.6           Neutrophils, Abs 16.98           Neutrophils Relative 90.4           nRBC 0.0           NUCLEATED RBC ABSOLUTE 0.00           Platelet Count 288           POC Glucose   218   175       Potassium 4.9           RBC 4.75           RDW 15.4           Sodium 136           WBC 18.77                   Significant Diagnostics:  X-Ray Hip 2 or 3 views Right with Pelvis when performed  Narrative: EXAMINATION:  XR HIP WITH PELVIS WHEN PERFORMED 2 OR 3 VIEWS RIGHT    CLINICAL HISTORY:  po; Unilateral primary osteoarthritis, right hip    TECHNIQUE:  AP view of the pelvis and frog leg lateral view of the right hip were performed.    COMPARISON:  Pelvic and hip radiographs 08/06/2024    FINDINGS:  Postsurgical changes of total right hip arthroplasty.  The hardware is intact.  No perihardware lucency to suggest malalignment or loosening.  No acute, displaced fracture  Impression: Postsurgical changes of total right hip arthroplasty.    Electronically signed by: Diana Song  Date:    09/30/2024  Time:    16:01       A/P: 63 y.o. male 1 Day Post-Op s/p right DAYRON  -Continue with current pain control regimen  -Continue with current physical therapy plan  -Continue with DVT prophylaxis (ASA)  -Anticipate discharge today    Herson Mcintosh MD  Orthopaedic Staff Surgeon

## 2024-10-01 NOTE — DISCHARGE INSTRUCTIONS
POSTERIOR HIP PRECAUTIONS    Hip Precautions must be followed for at least 3 months.  NO bending/flexing surgical hip greater than 90 degrees  NO crossing of surgical leg beyond midline  NO internal rotation of surgical leg beyond neutral (don't twist body toward surgical leg)    Keep dressing dry and intact, do not remove dressing, if dressing becomes wet or bloody notify home health staff.  Swingbed/Home Health will change your dressing on post op day #3 and then again in 7 days, give them that special dressing we sent home with you. If patient has a richard dressing, swingbed/home health will remove dressing in 7 days and apply new richard dressing.  *Continue incentive spirometry at least every 2 hours while awake.  *Continue white stockings remove 2 times a day for 1 hour and replace.  *Keep pillows between legs for abduction  *Take laxative of choice to have a bowel movement at least by tomorrow and then every other day.  *Increase fluids by mouth.  *Staples will be removed at follow up appointment  *Notify swingbed/home health staff if any concerns.

## 2024-10-01 NOTE — SUBJECTIVE & OBJECTIVE
Review of patient's allergies indicates:  No Known Allergies    No current facility-administered medications on file prior to encounter.     Current Outpatient Medications on File Prior to Encounter   Medication Sig    amLODIPine (NORVASC) 2.5 MG tablet Take 2.5 mg by mouth once daily.    cetirizine (ZYRTEC) 10 MG tablet Take 10 mg by mouth.    cyanocobalamin (VITAMIN B-12) 1000 MCG tablet 1,000 mcg.    diclofenac sodium (VOLTAREN) 1 % Gel APPLY 2 GRAMS TO AFFECTED AREA(S) 2 TIMES A DAY AS NEEDED FOR PAIN AND INFLAMMATION    pantoprazole (PROTONIX) 40 MG tablet Take 1 tablet (40 mg total) by mouth once daily.    pregabalin (LYRICA) 100 MG capsule TAKE ONE CAPSULE BY MOUTH THREE TIMES DAILY    tiZANidine 4 mg Cap Take 4 mg by mouth every 8 (eight) hours as needed (Spasm).    [DISCONTINUED] aspirin (ECOTRIN) 81 MG EC tablet 81 mg.    [DISCONTINUED] meloxicam (MOBIC) 7.5 MG tablet Take 1 tablet (7.5 mg total) by mouth once daily.    [DISCONTINUED] methylPREDNISolone (MEDROL DOSEPACK) 4 mg tablet use as directed    atorvastatin (LIPITOR) 20 MG tablet TAKE ONE-HALF TABLET BY MOUTH DAILY FOR CHOLESTEROL. STOP MEDICATION AND CALL PROVIDER FOR ANY UNEXPLAINED MUSCLE ACHES,PAIN OR WEAKNESS     Family History       Problem Relation (Age of Onset)    Diabetes Father    Hypertension Mother    Parkinsonism Mother, Father          Tobacco Use    Smoking status: Former     Current packs/day: 0.00     Types: Cigarettes     Quit date:      Years since quittin.7    Smokeless tobacco: Former     Types: Snuff     Quit date:    Substance and Sexual Activity    Alcohol use: Yes     Comment: occasionally    Drug use: Never    Sexual activity: Not on file     Review of Systems   All other systems reviewed and are negative.    Objective:     Vital Signs (Most Recent):  Temp: 98.5 °F (36.9 °C) (10/01/24 0759)  Pulse: 77 (10/01/24 0759)  Resp: 18 (10/01/24 0759)  BP: 139/84 (10/01/24 0759)  SpO2: (!) 94 % (10/01/24 0759)  Vital Signs (24h Range):  Temp:  [97.7 °F (36.5 °C)-98.8 °F (37.1 °C)] 98.5 °F (36.9 °C)  Pulse:  [71-97] 77  Resp:  [10-19] 18  SpO2:  [80 %-96 %] 94 %  BP: (110-154)/() 139/84     Weight: (!) 136.1 kg (300 lb 0.7 oz)  Body mass index is 41.85 kg/m².     Physical Exam  Vitals and nursing note reviewed.   Constitutional:       Appearance: He is obese.   HENT:      Head: Normocephalic.      Mouth/Throat:      Mouth: Mucous membranes are moist.   Eyes:      Extraocular Movements: Extraocular movements intact.   Neck:      Vascular: No carotid bruit.   Cardiovascular:      Rate and Rhythm: Normal rate and regular rhythm.      Pulses: Normal pulses.      Heart sounds: Normal heart sounds. No murmur heard.  Pulmonary:      Effort: Pulmonary effort is normal. No respiratory distress.      Breath sounds: Normal breath sounds.   Abdominal:      General: Abdomen is flat. Bowel sounds are normal. There is no distension.      Palpations: Abdomen is soft.   Musculoskeletal:      Cervical back: Normal range of motion and neck supple. No tenderness.      Comments: Right hip immobilized   Skin:     General: Skin is warm and dry.   Neurological:      General: No focal deficit present.      Mental Status: He is alert and oriented to person, place, and time.   Psychiatric:         Judgment: Judgment normal.                Significant Labs: All pertinent labs within the past 24 hours have been reviewed.      Significant Imaging: I have reviewed all pertinent imaging results/findings within the past 24 hours.

## 2024-10-01 NOTE — PLAN OF CARE
[facility] Telemetry  Discharge Final Note    Primary Care Provider: Bridgett Johnson MD    Expected Discharge Date: 10/1/2024    Final Discharge Note (most recent)       Final Note - 10/01/24 1436          Final Note    Assessment Type Final Discharge Note     Anticipated Discharge Disposition Home-Health Care Sv     What phone number can be called within the next 1-3 days to see how you are doing after discharge? [phone]        Post-Acute Status    Post-Acute Authorization Home Health     Home Health Status Set-up Complete/Auth obtained     Patient choice form signed by patient/caregiver List from CMS Compare;List with quality metrics by geographic area provided     Discharge Delays None known at this time                     Contact Info       Yudy Gonzalez FNP   Specialty: Family Medicine, Emergency Medicine, Orthopedic Surgery    [address] [facility]  [place] [address]   Phone: [phone]       Next Steps: Follow up on 10/14/2024    Instructions: 8:00 am          Pt to dc home today. RALPH spoke with Nicky at [facility] and [facility] Pt will be admitted on tomorrow. No other needs.

## 2024-10-02 PROCEDURE — G0180 MD CERTIFICATION HHA PATIENT: HCPCS | Mod: ,,, | Performed by: ORTHOPAEDIC SURGERY

## 2024-10-02 NOTE — ANESTHESIA POSTPROCEDURE EVALUATION
Anesthesia Post Evaluation    Patient: Ant Webber    Procedure(s) Performed: Procedure(s) (LRB):  ROBOTIC ARTHROPLASTY,HIP (Right)    Final Anesthesia Type: general      Patient location during evaluation: PACU  Post-procedure vital signs: reviewed and stable  Pain management: adequate  Airway patency: patent    PONV status at discharge: No PONV  Anesthetic complications: no      Cardiovascular status: hemodynamically stable  Respiratory status: unassisted  Hydration status: euvolemic  Follow-up not needed.              Vitals Value Taken Time   /70 09/30/24 1701   Temp 37.1 °C (98.8 °F) 09/30/24 1121   Pulse 91 09/30/24 1715   Resp 16 09/30/24 1648   SpO2 95 % 09/30/24 1715         Event Time   Out of Recovery 12:20:00         Pain/Ulises Score: Pain Rating Prior to Med Admin: 6 (10/1/2024  9:27 AM)  Pain Rating Post Med Admin: 2 (10/1/2024 10:27 AM)

## 2024-10-02 NOTE — DISCHARGE SUMMARY
[facility]  [facility]  Short Stay    Procedure(s) (LRB):  ROBOTIC ARTHROPLASTY,HIP (Right)      OUTCOME: Patient tolerated treatment/procedure well without complication and is now ready for discharge.    DISPOSITION: Home or Self Care    FINAL DIAGNOSIS:  Osteoarthritis of right hip    FOLLOWUP: In clinic    DISCHARGE INSTRUCTIONS:    Discharge Procedure Orders   Ambulatory referral/consult to Home Health   Standing Status: Future   Referral Priority: Routine Referral Type: Home Health Care   Referral Reason: Specialty Services Required   Requested Specialty: Home Health Services   Number of Visits Requested: 1     Diet general     Remove dressing in 48 hours     Change dressing (specify)   Order Comments: Dressing change:  On POD 3- recommend dressing change via Home Health     Call MD for:  temperature >100.4     Call MD for:  persistent nausea and vomiting     Call MD for:  severe uncontrolled pain     Call MD for:  difficulty breathing, headache or visual disturbances     Call MD for:  redness, tenderness, or signs of infection (pain, swelling, redness, odor or green/yellow discharge around incision site)     Call MD for:  hives     Call MD for:  persistent dizziness or light-headedness     Call MD for:  extreme fatigue         Clinical Reference Documents Added to Patient Instructions         Document    ACID REFLUX AND GASTROESOPHAGEAL REFLUX DISEASE IN ADULTS (ENGLISH)    CELECOXIB, ADULT (ENGLISH)    DIABETES AND DIET (ENGLISH)    DIABETES IN OLDER ADULTS (ENGLISH)    DOCUSATE AND SENNA, ADULT (ENGLISH)    GETTING IN AND OUT OF A TUB OR SHOWER WITH WALKER (ENGLISH)    GOING UP AND DOWN CURBS OR STAIRS WITH A WALKER OR CRUTCHES (ENGLISH)    HIGH BLOOD PRESSURE IN ADULTS (ENGLISH)    HIGH CHOLESTEROL (ENGLISH)    HIP ABDUCTION BRACE (ENGLISH)    HOW TO PUT ON AND TAKE OFF COMPRESSION STOCKINGS (ENGLISH)    HOW TO USE A WALKER (ENGLISH)    HOW TO USE AN INCENTIVE  SPIROMETER (ENGLISH)    ONDANSETRON, ADULT (ENGLISH)    OXYCODONE AND ACETAMINOPHEN, ADULT (ENGLISH)    POSTOP TOTAL HIP REPLACEMENT EXERCISES LYING DOWN (ENGLISH)    POSTOP TOTAL HIP REPLACEMENT EXERCISES STANDING (ENGLISH)    SLEEP APNEA IN ADULTS (ENGLISH)    TOTAL HIP CAR TRANSFER (ENGLISH)    TOTAL HIP REPLACEMENT DISCHARGE INSTRUCTIONS (ENGLISH)    USING COLD FOR PAIN (ENGLISH)            TIME SPENT ON DISCHARGE: 9 minutes

## 2024-10-03 NOTE — PROGRESS NOTES
[facility]  Wound Care    Patient Name:  Ant Webber   MRN:  [id number]  Date: 10/3/2024  Diagnosis: Osteoarthritis of right hip    History:     Past Medical History:   Diagnosis Date    Diabetes     Diarrhea 2023    Hypertension     Lumbar post-laminectomy syndrome     Lumbosacral radiculopathy     Sleep apnea     Transient ischemic attack (TIA)        Social History     Socioeconomic History    Marital status:    Occupational History    Occupation: retail sales   Tobacco Use    Smoking status: Former     Current packs/day: 0.00     Types: Cigarettes     Quit date:      Years since quittin.7    Smokeless tobacco: Former     Types: Snuff     Quit date:    Substance and Sexual Activity    Alcohol use: Yes     Comment: occasionally    Drug use: Never     Social Drivers of Health     Financial Resource Strain: Low Risk  (2024)    Overall Financial Resource Strain (CARDIA)     Difficulty of Paying Living Expenses: Not very hard   Food Insecurity: No Food Insecurity (2024)    Hunger Vital Sign     Worried About Running Out of Food in the Last Year: Never true     Ran Out of Food in the Last Year: Never true   Physical Activity: Unknown (2024)    Exercise Vital Sign     Days of Exercise per Week: 0 days   Stress: No Stress Concern Present (2024)    [employer] - Occupational Stress Questionnaire     Feeling of Stress : Not at all   Housing Stability: Unknown (2024)    Housing Stability Vital Sign     Unable to Pay for Housing in the Last Year: No       Precautions:     Allergies as of 2024    (No Known Allergies)       WO Assessment Details/Treatment     Narrative: Seen patient for initiation of preventative skin care measures    Patient up in chair, Alert. States skin is ok. Has BLAYNE hose on. Has overlay to bed.   Vincent score 21    Poss d/c home         10/03/2024

## 2024-10-14 ENCOUNTER — OFFICE VISIT (OUTPATIENT)
Dept: ORTHOPEDICS | Facility: CLINIC | Age: 64
End: 2024-10-14
Payer: OTHER GOVERNMENT

## 2024-10-14 DIAGNOSIS — Z96.641 S/P TOTAL RIGHT HIP ARTHROPLASTY: Primary | ICD-10-CM

## 2024-10-14 PROCEDURE — 99024 POSTOP FOLLOW-UP VISIT: CPT | Mod: ,,, | Performed by: NURSE PRACTITIONER

## 2024-10-14 PROCEDURE — 99213 OFFICE O/P EST LOW 20 MIN: CPT | Mod: PBBFAC | Performed by: NURSE PRACTITIONER

## 2024-10-14 PROCEDURE — 99999 PR PBB SHADOW E&M-EST. PATIENT-LVL III: CPT | Mod: PBBFAC,,, | Performed by: NURSE PRACTITIONER

## 2024-10-14 NOTE — PROGRESS NOTES
HISTORY OF PRESENT ILLNESS:       Pt is here today for First post-operative followup of his No surgery found on No surgery found    he is doing well.  And is 2 weeks postop right total hip arthroplasty, doing well.  Incision looks good today.  Staples removed and Steri-Strips applied.  He currently has home health PT and would like to continue home health until he is able to drive.  He is no longer taking pain medication.  Reports he is taking his aspirin.  No complaints today.    We have reviewed his findings and discussed plan of care and future treatment options, including the physical therapy plan.                                                                                     PHYSICAL EXAMINATION:     Incision sites healed well. ingy erythema, infection or induration  Range of motion of the hip is appropriate  Neurovascularly, the patient appears to have no deficits in the affected extremity      IMAGING:                                                                                ASSESSMENT:                                                                                                                                               1. S/P Robotic Arthroplasty,hip - Right  9/30/2024                                                                                                                                PLAN:                                                                                                                                                     1. Continue with PT  2. Emphasized the importance of hip abductor strengthening   3. I have discussed return to activity in detail.  4. he will see us back in 4 weeks.                                      5. All questions were answered and he should contact us if he  has any questions or concerns in the interim.            Continue PT.  Return to clinic 4 weeks with Dr. Mcintosh.  Continue aspirin.  Weight-bearing as tolerated  There are no Patient  Instructions on file for this visit.

## 2024-10-23 ENCOUNTER — EXTERNAL HOME HEALTH (OUTPATIENT)
Dept: HOME HEALTH SERVICES | Facility: HOSPITAL | Age: 64
End: 2024-10-23
Payer: OTHER GOVERNMENT

## 2024-10-30 ENCOUNTER — OFFICE VISIT (OUTPATIENT)
Dept: PAIN MEDICINE | Facility: CLINIC | Age: 64
End: 2024-10-30
Payer: OTHER GOVERNMENT

## 2024-10-30 VITALS
HEIGHT: 71 IN | HEART RATE: 77 BPM | WEIGHT: 304 LBS | DIASTOLIC BLOOD PRESSURE: 86 MMHG | BODY MASS INDEX: 42.56 KG/M2 | SYSTOLIC BLOOD PRESSURE: 133 MMHG | RESPIRATION RATE: 18 BRPM

## 2024-10-30 DIAGNOSIS — M54.2 NECK PAIN: Chronic | ICD-10-CM

## 2024-10-30 DIAGNOSIS — G89.4 CHRONIC PAIN SYNDROME: Chronic | ICD-10-CM

## 2024-10-30 DIAGNOSIS — M54.17 LUMBOSACRAL RADICULOPATHY: Primary | Chronic | ICD-10-CM

## 2024-10-30 PROCEDURE — 99215 OFFICE O/P EST HI 40 MIN: CPT | Mod: PBBFAC | Performed by: PAIN MEDICINE

## 2024-10-30 PROCEDURE — 99999 PR PBB SHADOW E&M-EST. PATIENT-LVL V: CPT | Mod: PBBFAC,,, | Performed by: PAIN MEDICINE

## 2024-10-30 PROCEDURE — 99212 OFFICE O/P EST SF 10 MIN: CPT | Mod: S$PBB,,, | Performed by: PAIN MEDICINE

## 2024-11-11 DIAGNOSIS — Z96.641 S/P TOTAL RIGHT HIP ARTHROPLASTY: Primary | ICD-10-CM

## 2024-11-12 ENCOUNTER — HOSPITAL ENCOUNTER (OUTPATIENT)
Dept: RADIOLOGY | Facility: HOSPITAL | Age: 64
Discharge: HOME | End: 2024-11-12
Attending: ORTHOPAEDIC SURGERY
Payer: OTHER GOVERNMENT

## 2024-11-12 ENCOUNTER — OFFICE VISIT (OUTPATIENT)
Dept: ORTHOPEDICS | Facility: CLINIC | Age: 64
End: 2024-11-12
Payer: OTHER GOVERNMENT

## 2024-11-12 DIAGNOSIS — Z96.641 S/P TOTAL RIGHT HIP ARTHROPLASTY: Primary | ICD-10-CM

## 2024-11-12 DIAGNOSIS — Z96.641 S/P TOTAL RIGHT HIP ARTHROPLASTY: ICD-10-CM

## 2024-11-12 PROCEDURE — 99213 OFFICE O/P EST LOW 20 MIN: CPT | Mod: PBBFAC,25 | Performed by: ORTHOPAEDIC SURGERY

## 2024-11-12 PROCEDURE — 73502 X-RAY EXAM HIP UNI 2-3 VIEWS: CPT | Mod: TC,RT

## 2024-11-12 PROCEDURE — 73502 X-RAY EXAM HIP UNI 2-3 VIEWS: CPT | Mod: 26,RT,, | Performed by: ORTHOPAEDIC SURGERY

## 2024-11-12 PROCEDURE — 99024 POSTOP FOLLOW-UP VISIT: CPT | Mod: ,,, | Performed by: ORTHOPAEDIC SURGERY

## 2024-11-12 PROCEDURE — 99999 PR PBB SHADOW E&M-EST. PATIENT-LVL III: CPT | Mod: PBBFAC,,, | Performed by: ORTHOPAEDIC SURGERY

## 2024-11-12 NOTE — PROGRESS NOTES
HISTORY OF PRESENT ILLNESS:       Pt is here today for Second post-operative followup of his No surgery found on No surgery found    he is doing well.    He just finished up PT with [facility] yesterday.     We have reviewed his findings and discussed plan of care and future treatment options, including the physical therapy plan.                                                                                     PHYSICAL EXAMINATION:     Incision sites healed well.   No evidence of any erythema, infection or induration  Range of motion of the hip is appropriate  Neurovascularly, the patient appears to have no deficits in the affected extremity      IMAGING: X-Ray Hip 2 or 3 views Right with Pelvis when performed    Result Date: 11/12/2024  See Procedure Notes for results. IMPRESSION: Please see Ortho procedure notes for report.  This procedure was auto-finalized by: Virtual Radiologist   3 Radiographs of the left hip were performed today.  Radiographs demonstrate a well positioned hip arthroplasty. Components show no signs of loosening or fracture.  The lesser trochanter is appropriately aligned with the contralateral lesser trochanter.  No adverse interval change compared to prior imaging.                                                                                  ASSESSMENT:                                                                                                                                               1. S/P Robotic Arthroplasty,hip - Right  9/30/2024                                                                                                                                PLAN:                                                                                                                                                     1. Continue with PT- needs a few more weeks  2. Emphasized the importance of hip abductor strengthening   3. I have discussed return to activity in detail.  4.  he will see us back in 4 weeks.                                      5. All questions were answered and he should contact us if he  has any questions or concerns in the interim.              There are no Patient Instructions on file for this visit.

## 2024-11-14 ENCOUNTER — CLINICAL SUPPORT (OUTPATIENT)
Dept: REHABILITATION | Facility: HOSPITAL | Age: 64
End: 2024-11-14
Payer: OTHER GOVERNMENT

## 2024-11-14 DIAGNOSIS — R29.898 WEAKNESS OF RIGHT LEG: ICD-10-CM

## 2024-11-14 DIAGNOSIS — Z96.641 STATUS POST TOTAL HIP REPLACEMENT, RIGHT: Primary | ICD-10-CM

## 2024-11-14 DIAGNOSIS — Z96.641 S/P TOTAL RIGHT HIP ARTHROPLASTY: ICD-10-CM

## 2024-11-14 PROCEDURE — 97110 THERAPEUTIC EXERCISES: CPT

## 2024-11-14 PROCEDURE — 97162 PT EVAL MOD COMPLEX 30 MIN: CPT

## 2024-11-14 NOTE — PLAN OF CARE
[facility]   Physical Therapy Initial Evaluation      Name: Ant Webber  [facility] Number: [id number]    Therapy Diagnosis: Right Total Hip Replacement    Physician: Herson Mcintosh MD    Physician Orders: PT Eval and Treat   Medical Diagnosis from Referral: Right Hip DAYRON   Evaluation Date: 11/14/2024  Authorization Period Expiration: 3/29/2025  Plan of Care Expiration: 2/7/2025    Visit # / Visits authorized: 1/ 15   FOTO: 33/100    Precautions: DAYRON Precautions      Time In: 1:05 pm   Time Out: 1:50 pm   Total Appointment Time (timed & untimed codes): 45 minutes    Subjective     Date of onset: 9/30/2024     History of current condition - Ant reports:  he had long standing pain in the Right Hip. 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 Patient elected to have surgery and underwent Right DAYRON surgery on 9/30/2024. He did have home health Physical Therapy until 11/12/2024. He had follow up appointment with Orthopedic Surgeon on 11/13/2024. MD wants patient to work on increasing Hip Strength and normalizing gait. MD ordered Outpatient Physical Therapy and patient is here today for the Evaluation.   PMH : He has history of Left Hip Labrum Repair in 2020      Falls: None     Imaging: MRI studies and X Rays :   X Ray Right Hip 11/13/2024 : 3 Radiographs of the left hip were performed today.  Radiographs demonstrate a well positioned hip arthroplasty. Components show no signs of loosening or fracture.  The lesser trochanter is appropriately aligned with the contralateral lesser trochanter.  No adverse interval change compared to prior imaging.        MRI read per Dr Herson Mcintosh prior to Surgery : Per my read he has significant right hip osteoarthritis and degenerative labral tear on the right hip with a very large cam deformity in significant joint space narrowing right hip    Previous MRI of Lumbar Spine :   L2-L3: Disc bulge and facet degeneration.  No spinal canal stenosis.  Mild bilateral  "foraminal stenosis.  L3-L4: Previous left hemilaminectomy.  Disc bulging.  No spinal canal stenosis.  Facet degeneration.  Moderate left and mild right foraminal stenosis.  L4-5 disc osteophyte complex.  No spinal canal stenosis.  Moderate bilateral foraminal stenosis from facet degeneration and vertebral body osteophytes.  L5-S1: No spinal canal stenosis.  Facet degeneration.  Mild right foraminal stenosis.  Impression:  Multilevel degenerative changes of the lumbar spine as detailed.       Prior Therapy: Home Health PT with [facility] until 11/12/2024  Social History:  lives alone  Occupation: Retired from the "[employer]   Prior Level of Function: Independent and Active prior to Right Hip Pain   Current Level of Function: Difficulty with sit to stand, standing, and walking due to Right Hip Pain and Weakness     Pain:  Current 4/10, worst 7/10, best 2/10   Location: right hip   Description: Aching, Dull, and Tight  Aggravating Factors: Standing and Walking  Easing Factors: pain medication, ice, and rest    Patients goals: "I want to be able to walk better so I can go fishing."     Medical History:   Past Medical History:   Diagnosis Date    Diabetes     Diarrhea 08/03/2023    Hypertension     Lumbar post-laminectomy syndrome     Lumbosacral radiculopathy     Sleep apnea     Transient ischemic attack (TIA) 2024       Surgical History:   Ant Calderaton  has a past surgical history that includes Hip arthroscopy w/ labral debridement (2020); Back surgery (2013); Hand surgery (2003); Tonsillectomy; Umbilical hernia repair; Cholecystotomy; L5-S1 SUSAN (02/27/2019); Bilateral L4-L5 TFESI (Bilateral, 10-, 8-2-2017, 6-); Left L4-L5 TFESI (Left, 8-, 7-, 6-8-2016); Bilateral L5-S1 TFESI (Bilateral, 05/15/2019); Selective injection of anesthetic agent around lumbar spinal nerve root by transforaminal approach (Left, 9/16/2021); Epidural steroid injection (N/A, 2/13/2024); and robotic " arthroplasty, hip (Right, 9/30/2024).    Medications:   Ant has a current medication list which includes the following prescription(s): amlodipine, aspirin, atorvastatin, celecoxib, cetirizine, cyanocobalamin, diclofenac sodium, ondansetron, oxycodone-acetaminophen, pantoprazole, pregabalin, senna-docusate 8.6-50 mg, sitagliptin, and tizanidine.    Allergies:   Review of patient's allergies indicates:  No Known Allergies     Objective        HIP OBSERVATION  Incision(s): Well Healed       Right Lower Extremity  ROM/Strength Left lower Extremity     AROM PROM STRENGTH  AROM PROM STRENGTH   90   3/5 HIP     Flexion (120) 110   4/5   5              Extension (15) 10     20              Abduction (45) 45     Neutral              Adduction (25) 15     10 15             Internal Rotation (30) 20     15 20             External Rotation (50) 40      110   3+/5 KNEE Flexion (supine) 110   4+/5   0              Extension  0             Weight Bearing:  [x] WEIGHT BEARING AS TOLERATED  [] PARTIAL WEIGHT BEARING  %  [] TOUCH TOE WEIGHT BEARING  [] NONWEIGHT BEARING     Transfers:  Patient transfers weight to Left Lower Extremity with sit to stand. He has difficulty standing from low surfaces.     Ambulation:  Patient is noted to have decreased stance time and antalgic gait on the Right. Patient has decreased heel strike and hits foot flat with initial contact on the Right. Patient has decreased step/stride length resulting in slow brook.       Stairs:  Unable to perform stair negotiation at this time     Balance : Poor. Inability to perform single leg stance    Other:  He has history of Left Hip Labrum Repair. Now due to the recent Right Hip surgery he is having new onset Left IT Band pain and popping around the knee.             Limitation/Restriction for FOTO Intake Hip Survey    Therapist reviewed FOTO scores for Ant Webber on 11/14/2024.   FOTO documents entered into [employer] - see Media section.    Limitation Score:  67%         Treatment     Total Treatment time (time-based codes) separate from Evaluation: 10 minutes       Ant received the treatments listed below:  THERAPEUTIC EXERCISES to develop strength, endurance, ROM, flexibility, posture, and core stabilization for 10 minutes including :  Therapist initiated the basic Home Exercise Program for Hip which includes : sit to stand, standing marches, standing hip abduction,  Bridges, Clams, Supine SLR, and Supine Hip Abduction.     Patient Education and Home Exercises     Education provided:   - Discussed the findings from the Evaluation, Reviewed the Plan of Care, and Instructed patient on their Home Exercise Program       Written Home Exercises Provided: yes. Exercises were reviewed and Ant was able to demonstrate them prior to the end of the session.  Ant demonstrated fair  understanding of the education provided. See EMR under Patient Instructions for exercises provided during therapy sessions.    Assessment     Ant is a 64 y.o. male referred to outpatient Physical Therapy with a medical diagnosis of Right TKA. Ant reports:  he had long standing pain in the Right Hip. 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 Patient elected to have surgery and underwent Right DAYRON surgery on 9/30/2024. He did have home health Physical Therapy until 11/12/2024. He had follow up appointment with Orthopedic Surgeon on 11/13/2024. MD wants patient to work on increasing Hip Strength and normalizing gait. MD ordered Outpatient Physical Therapy and patient is here today for the Evaluation.   PMH : He has history of Left Hip Labrum Repair in 2020.   Patient presents with decreased Right Hip Strength and Range of Motion as expected following DAYRON. He has decreased Range of Motion in all directions and remains under the DAYRNO precautions. Strength is reduced especially with Hip Abduction. Patient has difficulty with transfers. Patient transfers weight to Left Lower Extremity with sit to  stand. He especially has difficulty standing from low surfaces. During ambulation, patient is noted to have decreased stance time and antalgic gait on the Right. Patient has decreased heel strike and hits foot flat with initial contact on the Right. Patient has decreased step/stride length resulting in slow brook. He is unable to perform stair negotiation at this time. Patient has poor balance at this time and is unable to perform single leg stance.   Patient has history of Left Hip Labrum Repair. Now due to the recent Right Hip surgery he is having new onset Left IT Band pain and popping around the knee.  Therapist will address both Hips during rehabilitation.   Therapist initiated the basic Home Exercise Program for Hip which includes : sit to stand, standing marches, standing hip abduction,  Bridges, Clams, Supine SLR, and Supine Hip Abduction.   Patient will benefit from skilled Physical Therapy intervention to address all deficits and help patient to return to their prior level of function.        Patient prognosis is Good.   Patient will benefit from skilled outpatient Physical Therapy to address the deficits stated above and in the chart below, provide patient /family education, and to maximize patientt's level of independence.     Plan of care discussed with patient: Yes  Patient's spiritual, cultural and educational needs considered and patient is agreeable to the plan of care and goals as stated below:     Anticipated Barriers for therapy: None     Medical Necessity is demonstrated by the following  History  Co-morbidities and personal factors that may impact the plan of care [] LOW: no personal factors / co-morbidities  [] MODERATE: 1-2 personal factors / co-morbidities  [x] HIGH: 3+ personal factors / co-morbidities    Moderate / High Support Documentation:   Co-morbidities affecting plan of care:   Past Medical History:   Diagnosis Date    Diabetes     Diarrhea 08/03/2023    Hypertension     Lumbar  post-laminectomy syndrome     Lumbosacral radiculopathy     Sleep apnea     Transient ischemic attack (TIA) 2024       has a past surgical history that includes Hip arthroscopy w/ labral debridement (2020); Back surgery (2013); Hand surgery (2003); Tonsillectomy; Umbilical hernia repair; Cholecystotomy; L5-S1 SUSAN (02/27/2019); Bilateral L4-L5 TFESI (Bilateral, 10-, 8-2-2017, 6-); Left L4-L5 TFESI (Left, 8-, 7-, 6-8-2016); Bilateral L5-S1 TFESI (Bilateral, 05/15/2019); Selective injection of anesthetic agent around lumbar spinal nerve root by transforaminal approach (Left, 9/16/2021); Epidural steroid injection (N/A, 2/13/2024); and robotic arthroplasty, hip (Right, 9/30/2024).  Personal Factors:   no deficits     Examination  Body Structures and Functions, activity limitations and participation restrictions that may impact the plan of care [] LOW: addressing 1-2 elements  [] MODERATE: 3+ elements  [x] HIGH: 4+ elements (please support below)    Moderate / High Support Documentation: Patient presents with decreased Right Hip Strength and Range of Motion as expected following DAYRON. He has decreased Range of Motion in all directions and remains under the DAYRON precautions. Strength is reduced especially with Hip Abduction. Patient has difficulty with transfers. Patient transfers weight to Left Lower Extremity with sit to stand. He especially has difficulty standing from low surfaces. During ambulation, patient is noted to have decreased stance time and antalgic gait on the Right. Patient has decreased heel strike and hits foot flat with initial contact on the Right. Patient has decreased step/stride length resulting in slow brook. He is unable to perform stair negotiation at this time. Patient has poor balance at this time and is unable to perform single leg stance.   Patient has history of Left Hip Labrum Repair. Now due to the recent Right Hip surgery he is having new onset Left IT Band  pain and popping around the knee.  Therapist will address both Hips during rehabilitation.      Clinical Presentation [] LOW: stable  [x] MODERATE: Evolving - MD protocol for this diagnosis is extended and patient will likely require additional time with therapy in order to meet all goals set.    [] HIGH: Unstable     Decision Making/ Complexity Score: moderate       Goals:  Short Term Goals: 6 weeks   1. Independent with Home Exercise Program   2. Increase Right Hip Range of Motion to Within Normal Limits for a DAYRON   3. Increase Right Hip Strength to grossly 4/5  4. Patient will ambulate 500 feet with stance time approximately equal from Right to Left and with pain reports at Less than or equal to 4/10   5. Patient will be able to perform stair negotiation with step-to gait pattern     Long Term Goals: 12 weeks   1. Patient will increase Right Hip Strength to grossly 4+/5  2. Patient will ambulate 1000+ feet with good step/stride length and with no complaints of pain or weakness in Right Hip     Plan     Plan of care Certification: 11/14/2024 to 2/7/2025.    Outpatient Physical Therapy 2 times weekly for 12 weeks to include the following interventions: 04650 [therapeutic exercise], 58558 [neuromuscular re-education], 54360 [gait training], 63368 [manual therapy], 16765 [therapeutic activities], and 24422 [unattended electrical stimulation]    SUE WAGGONER, PT, DPT

## 2024-11-14 NOTE — PROGRESS NOTES
See Plan of Care     Sup Visit performed today with QAMAR Diaz, QAMAR Galindo, and QAMAR Powers.  All goals and treatment plan reviewed. Will work toward completion of all goals set.     First Treatment May Include :     Bike/NuStep    SB  Hamstring Stretch on Stairs     Standing :   Standing at the Rail   Sit to Stand from Chair  Standing Marches   Hip Abduction - Bilateral   Heel Raises     Seated :  Marching   Hip Abduction   Hip Adduction   LAQ's     Supine :   Heel Slides   Straight Leg Raises   Hip Abduction   Bridges   Bridges with Abduction   Hooklying Hip ABD/ADD combo   Clams in Sidelying

## 2024-11-18 ENCOUNTER — CLINICAL SUPPORT (OUTPATIENT)
Dept: REHABILITATION | Facility: HOSPITAL | Age: 64
End: 2024-11-18
Payer: OTHER GOVERNMENT

## 2024-11-18 DIAGNOSIS — R29.898 WEAKNESS OF RIGHT LEG: ICD-10-CM

## 2024-11-18 DIAGNOSIS — Z96.641 STATUS POST TOTAL HIP REPLACEMENT, RIGHT: Primary | ICD-10-CM

## 2024-11-18 PROCEDURE — 97110 THERAPEUTIC EXERCISES: CPT | Mod: CQ

## 2024-11-18 PROCEDURE — 97530 THERAPEUTIC ACTIVITIES: CPT | Mod: CQ

## 2024-11-18 NOTE — PROGRESS NOTES
[facility]   Physical Therapy Treatment Note      Name: Ant QUINTANA [facility] Number: [id number]    Therapy Diagnosis:   Encounter Diagnoses   Name Primary?    Status post total hip replacement, right Yes    Weakness of right leg      Physician: Herson Mcintosh MD    Visit Date: 11/18/2024     Physician Orders: PT Eval and Treat   Medical Diagnosis from Referral: Right Hip DAYRON   Evaluation Date: 11/14/2024  Authorization Period Expiration: 3/29/2025  Plan of Care Expiration: 2/7/2025    Visit # / Visits authorized: 2/ 15   PTA Visit #: 1/5   FOTO: 33/100     Precautions: DAYRON Precautions       Time In: 1:00 pm   Time Out: 1:40 pm   Total Appointment Time (timed & untimed codes): 40 minutes      Subjective     Patient reports: he has been doing his exercises at home but is having trouble with the clamshells due to the weakness in that hip.   He was compliant with home exercise program.  Response to previous treatment: first visit since evaluation   Functional change: n/a    Pain: 2/10  Location: right hip      Objective      Objective Measures updated at progress report unless specified.     Treatment     Ant received the treatments listed below:      therapeutic exercises to develop strength, endurance, ROM, and flexibility for 25 minutes including:    NuStep: 5 minutes   SB 4 x 15 second hold   Hamstring Stretch on Stairs     Iliotibial band stretch on LLE in supine with green strap 3 x 30 seconds     Seated :  Marching   Hip Abduction   Hip Adduction   LAQ's x 20 (B)    Supine :   Heel Slides   Straight Leg Raises x 20   Hip Abduction   Bridges 2 x 10  Bridges with Abduction   Hooklying Hip ABD/ADD combo x 20 with 5sh squeeze into ADD and then abduction with green Theraband   Clams in Sidelying  4 x 5 (with pillow between knees)  Sidelying hip abduction 4 x 5 (with pillow between knees)    manual therapy techniques: - were applied to the: - for - minutes, including:      neuromuscular  re-education activities to improve: Balance, Coordination, Kinesthetic, Sense, and Proprioception for - minutes. The following activities were included:      therapeutic activities to improve functional performance for 15  minutes, including:  Standing at the Rail   Sit to Stand from black box x 20  Standing Marches x 20 (B)  Hip Abduction - Bilateral x 20 each  Heel Raises x 20       Patient Education and Home Exercises       Education provided:   - home exercise program review, education on DAYRON precautions.     Written Home Exercises Provided: Pt instructed to continue prior HEP. Exercises were reviewed and Ant was able to demonstrate them prior to the end of the session.  Ant demonstrated good  understanding of the education provided. See Electronic Medical Record under Patient Instructions for exercises provided during therapy sessions    Assessment     Supervisory visit with SUE WAGGONER, PT, DPT prior to initial PTA visit.     Patient arrived today for initial visit after evaluation with reports of compliance with home exercise program and mild pain in the right hip. He is doing well following his right DAYRON on 9/30/2024. He does report discomfort in the left iliotibial band and also difficulty with SL clamshells. Addressed left iliotibial band tightness with gentle stretch in supine using green strap. Patient did report a lot of tightness with this with mild relief in pain. Educated Patient on importance of DAYRON precautions with SL clams and to always place pillow between knees when he does this exercise. Patient agreed. Overall Patient tolerated session well with mat and standing exercises. Educated Patient to remain diligent with home exercise program and we will progress him as able.     PMH:  Ant is a 64 y.o. male referred to outpatient Physical Therapy with a medical diagnosis of Right TKA. Ant reports:  he had long standing pain in the Right Hip. 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  Patient elected to have surgery and underwent Right DAYRON surgery on 9/30/2024. He did have home health Physical Therapy until 11/12/2024. He had follow up appointment with Orthopedic Surgeon on 11/13/2024. MD wants patient to work on increasing Hip Strength and normalizing gait. MD ordered Outpatient Physical Therapy and patient is here today for the Evaluation.   PMH : He has history of Left Hip Labrum Repair in 2020.   Patient presents with decreased Right Hip Strength and Range of Motion as expected following DAYRON. He has decreased Range of Motion in all directions and remains under the DAYRON precautions. Strength is reduced especially with Hip Abduction. Patient has difficulty with transfers. Patient transfers weight to Left Lower Extremity with sit to stand. He especially has difficulty standing from low surfaces. During ambulation, patient is noted to have decreased stance time and antalgic gait on the Right. Patient has decreased heel strike and hits foot flat with initial contact on the Right. Patient has decreased step/stride length resulting in slow brook. He is unable to perform stair negotiation at this time. Patient has poor balance at this time and is unable to perform single leg stance.   Patient has history of Left Hip Labrum Repair. Now due to the recent Right Hip surgery he is having new onset Left IT Band pain and popping around the knee.  Therapist will address both Hips during rehabilitation.   Therapist initiated the basic Home Exercise Program for Hip which includes : sit to stand, standing marches, standing hip abduction,  Bridges, Clams, Supine SLR, and Supine Hip Abduction.   Patient will benefit from skilled Physical Therapy intervention to address all deficits and help patient to return to their prior level of function.      Ant Is progressing well towards his goals.   Patient prognosis is Good.     Patient will continue to benefit from skilled outpatient physical therapy to address the  deficits listed in the problem list box on initial evaluation, provide pt/family education and to maximize pt's level of independence in the home and community environment.     Patient's spiritual, cultural and educational needs considered and pt agreeable to plan of care and goals.     Anticipated barriers to physical therapy: none    Goals:  Short Term Goals: 6 weeks   1. Independent with Home Exercise Program   2. Increase Right Hip Range of Motion to Within Normal Limits for a DAYRON   3. Increase Right Hip Strength to grossly 4/5  4. Patient will ambulate 500 feet with stance time approximately equal from Right to Left and with pain reports at Less than or equal to 4/10   5. Patient will be able to perform stair negotiation with step-to gait pattern      Long Term Goals: 12 weeks   1. Patient will increase Right Hip Strength to grossly 4+/5  2. Patient will ambulate 1000+ feet with good step/stride length and with no complaints of pain or weakness in Right Hip    Plan     Plan of care Certification: 11/14/2024 to 2/7/2025.     Outpatient Physical Therapy 2 times weekly for 12 weeks to include the following interventions: 23261 [therapeutic exercise], 77220 [neuromuscular re-education], 92336 [gait training], 97834 [manual therapy], 74801 [therapeutic activities], and 80385 [unattended electrical stimulation]    Santo Flores PTA

## 2024-11-22 ENCOUNTER — CLINICAL SUPPORT (OUTPATIENT)
Dept: REHABILITATION | Facility: HOSPITAL | Age: 64
End: 2024-11-22
Payer: OTHER GOVERNMENT

## 2024-11-22 DIAGNOSIS — R29.898 WEAKNESS OF RIGHT LEG: ICD-10-CM

## 2024-11-22 DIAGNOSIS — Z96.641 STATUS POST TOTAL HIP REPLACEMENT, RIGHT: Primary | ICD-10-CM

## 2024-11-22 PROCEDURE — 97530 THERAPEUTIC ACTIVITIES: CPT | Mod: CQ

## 2024-11-22 PROCEDURE — 97110 THERAPEUTIC EXERCISES: CPT | Mod: CQ

## 2024-11-22 NOTE — PROGRESS NOTES
[facility]   Physical Therapy Treatment Note      Name: Ant Webber  Clinic Number: [id number]    Therapy Diagnosis:   No diagnosis found.    Physician: Herson Mcintosh MD    Visit Date: 11/22/2024     Physician Orders: PT Eval and Treat   Medical Diagnosis from Referral: Right Hip DAYRON   Evaluation Date: 11/14/2024  Authorization Period Expiration: 3/29/2025  Plan of Care Expiration: 2/7/2025    Visit # / Visits authorized: 3/ 15   PTA Visit #: 2/5   FOTO: 33/100     Precautions: DAYRON Precautions       Time In: 10:12 am  Time Out: 11:00 am  Total Appointment Time (timed & untimed codes): 48 minutes    Subjective     Patient reports: cooler weather has his hip sore today.   He was compliant with home exercise program.  Response to previous treatment: soreness in the hip  Functional change: n/a    Pain: 3/10  Location: right hip      Objective      Objective Measures updated at progress report unless specified.     Treatment     Ant received the treatments listed below:      therapeutic exercises to develop strength, endurance, ROM, and flexibility for 28 minutes including:    NuStep: 5 minutes   SB 4 x 15 second hold   Hamstring Stretch on Stairs 4 x 15 second hold on 1st step to remain within DAYRON precautions    Iliotibial band stretch on LLE in supine with green strap 4 x 15 seconds     Seated :  Marching   Hip Abduction x 20 with green band   Hip Adduction 20x5sh with soccer ball   LAQ's x 20 (RLE) with 4# cuff wt    Supine :   Heel Slides   Straight Leg Raises x 20 with 2# wt  Hip Abduction   Bridges 2 x 10 with blue band   Bridges with Abduction 2 x 10 with blue Theraband   Hooklying Hip ABD/ADD combo x 20 with 5sh squeeze into ADD and then abduction with blue Theraband   Clams in Sidelying  2 x 10 (with pillow between knees)  Sidelying hip abduction 2 x 10 (with pillow between knees)    manual therapy techniques: - were applied to the: - for - minutes, including:    neuromuscular  re-education activities to improve: Balance, Coordination, Kinesthetic, Sense, and Proprioception for - minutes. The following activities were included:    therapeutic activities to improve functional performance for 18  minutes, including:    Standing at the Rail   Sit to Stand from 24inch black box x 20  Standing Marches x 20 (B)  Hip Abduction - Bilateral x 20 each  Heel Raises x 30   Standing hip extension at rail x 20 (B)    Patient Education and Home Exercises       Education provided:   - home exercise program review, education on DAYRON precautions.     Written Home Exercises Provided: Pt instructed to continue prior HEP. Exercises were reviewed and Ant was able to demonstrate them prior to the end of the session.  Ant demonstrated good  understanding of the education provided. See Electronic Medical Record under Patient Instructions for exercises provided during therapy sessions    Assessment       Supervisory visit with SUE WAGGONER, PT, DPT prior to initial PTA visit.     Patient arrived today for his second visit after evaluation with reports of compliance with home exercise program and mild pain in the right hip from the cooler weather today. He is doing well following his right DAYRON on 9/30/2024. He does report discomfort in the left iliotibial band and also difficulty with SL clamshells. Addressed left iliotibial band tightness with gentle stretch in supine using green strap and gentle myofascial release to Itband insertion point.  Patient did report a lot of tightness with this with mild relief in pain. Educated Patient on importance of DAYRON precautions with SL clams/SL hip abduction and to always place pillow between knees when he does this exercise. Patient agreed. Overall Patient tolerated session well with progressions made on addition of 4# ankle weight to seated long arc quad, blue Theraband to bridging with abduction, and 2# ankle weight for supine SLR. Educated Patient to remain diligent with  home exercise program and we will progress him as able.     PMH:  Ant is a 64 y.o. male referred to outpatient Physical Therapy with a medical diagnosis of Right TKA. Ant reports:  he had long standing pain in the Right Hip. 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 Patient elected to have surgery and underwent Right DAYRON surgery on 9/30/2024. He did have home health Physical Therapy until 11/12/2024. He had follow up appointment with Orthopedic Surgeon on 11/13/2024. MD wants patient to work on increasing Hip Strength and normalizing gait. MD ordered Outpatient Physical Therapy and patient is here today for the Evaluation.   PMH : He has history of Left Hip Labrum Repair in 2020.   Patient presents with decreased Right Hip Strength and Range of Motion as expected following DAYRON. He has decreased Range of Motion in all directions and remains under the DAYRON precautions. Strength is reduced especially with Hip Abduction. Patient has difficulty with transfers. Patient transfers weight to Left Lower Extremity with sit to stand. He especially has difficulty standing from low surfaces. During ambulation, patient is noted to have decreased stance time and antalgic gait on the Right. Patient has decreased heel strike and hits foot flat with initial contact on the Right. Patient has decreased step/stride length resulting in slow brook. He is unable to perform stair negotiation at this time. Patient has poor balance at this time and is unable to perform single leg stance.   Patient has history of Left Hip Labrum Repair. Now due to the recent Right Hip surgery he is having new onset Left IT Band pain and popping around the knee.  Therapist will address both Hips during rehabilitation.   Therapist initiated the basic Home Exercise Program for Hip which includes : sit to stand, standing marches, standing hip abduction,  Bridges, Clams, Supine SLR, and Supine Hip Abduction.   Patient will benefit from skilled Physical  Therapy intervention to address all deficits and help patient to return to their prior level of function.      Ant Is progressing well towards his goals.   Patient prognosis is Good.     Patient will continue to benefit from skilled outpatient physical therapy to address the deficits listed in the problem list box on initial evaluation, provide pt/family education and to maximize pt's level of independence in the home and community environment.     Patient's spiritual, cultural and educational needs considered and pt agreeable to plan of care and goals.     Anticipated barriers to physical therapy: none    Goals:  Short Term Goals: 6 weeks   1. Independent with Home Exercise Program   2. Increase Right Hip Range of Motion to Within Normal Limits for a DAYRON   3. Increase Right Hip Strength to grossly 4/5  4. Patient will ambulate 500 feet with stance time approximately equal from Right to Left and with pain reports at Less than or equal to 4/10   5. Patient will be able to perform stair negotiation with step-to gait pattern      Long Term Goals: 12 weeks   1. Patient will increase Right Hip Strength to grossly 4+/5  2. Patient will ambulate 1000+ feet with good step/stride length and with no complaints of pain or weakness in Right Hip    Plan     Plan of care Certification: 11/14/2024 to 2/7/2025.     Outpatient Physical Therapy 2 times weekly for 12 weeks to include the following interventions: 69533 [therapeutic exercise], 94895 [neuromuscular re-education], 63539 [gait training], 41847 [manual therapy], 86808 [therapeutic activities], and 94815 [unattended electrical stimulation]    Santo Flores PTA   11/22/2024

## 2024-11-25 ENCOUNTER — CLINICAL SUPPORT (OUTPATIENT)
Dept: REHABILITATION | Facility: HOSPITAL | Age: 64
End: 2024-11-25
Payer: OTHER GOVERNMENT

## 2024-11-25 DIAGNOSIS — Z96.641 STATUS POST TOTAL HIP REPLACEMENT, RIGHT: Primary | ICD-10-CM

## 2024-11-25 DIAGNOSIS — R29.898 WEAKNESS OF RIGHT LEG: ICD-10-CM

## 2024-11-25 PROCEDURE — 97530 THERAPEUTIC ACTIVITIES: CPT

## 2024-11-25 PROCEDURE — 97110 THERAPEUTIC EXERCISES: CPT

## 2024-11-25 NOTE — PROGRESS NOTES
[facility]   Physical Therapy Treatment Note      Name: Ant Webber  Clinic Number: [id number]    Therapy Diagnosis:   No diagnosis found.    Physician: Herson Mcintosh MD    Visit Date: 11/25/2024     Physician Orders: PT Eval and Treat   Medical Diagnosis from Referral: Right Hip DAYRON   Evaluation Date: 11/14/2024  Authorization Period Expiration: 3/29/2025  Plan of Care Expiration: 2/7/2025    Visit # / Visits authorized: 3/ 15   PTA Visit #: 2/5   FOTO: 33/100     Precautions: DAYRON Precautions       Time In: 1:35 pm  Time Out: 2:30 pm  Total Appointment Time (timed & untimed codes): 31 minutes (time billed reflects time spent with patient one on one)      Subjective     Patient reports: his hip is doing well and he is having less pain.   He was compliant with home exercise program.  Response to previous treatment: soreness in the hip  Functional change: n/a    Pain: 2/10  Location: right hip      Objective      Objective Measures updated at progress report unless specified.     Treatment     Ant received the treatments listed below:      therapeutic exercises to develop strength, endurance, ROM, and flexibility for 23 minutes including:    NuStep: 5 minutes   SB 4 x 15 second hold   Hamstring Stretch on Stairs 4 x 15 second hold on 1st step to remain within DAYRON precautions    Iliotibial band stretch on LLE in supine with green strap 4 x 15 seconds     Seated :  Marching   Hip Abduction x 20 with green band   Hip Adduction 20x5sh with soccer ball   LAQ's x 20 (RLE) with 4# cuff wt    Supine :   Heel Slides   Straight Leg Raises x 20 with 3# wt  Hip Abduction   Bridges 2 x 10 with blue band   Bridges with Abduction 2 x 10 with blue Theraband   Hooklying Hip ABD/ADD combo x 20 with 5sh squeeze into ADD and then abduction with blue Theraband   Supine Hip Abduction 2 x 10 with 3# wt  Clams in Sidelying  2 x 10 (with pillow between knees) and green theraband  Sidelying hip abduction 2 x 10  (with pillow between knees) and green theraband     manual therapy techniques: - were applied to the: - for - minutes, including:    neuromuscular re-education activities to improve: Balance, Coordination, Kinesthetic, Sense, and Proprioception for - minutes. The following activities were included:    therapeutic activities to improve functional performance for 8  minutes, including:    Standing at the Rail   Sit to Stand from 24inch black box x 20  Standing Marches x 20 (B)  Hip Abduction - Bilateral x 20 each  Heel Raises x 30   Standing hip extension at rail x 20 (B)    Patient Education and Home Exercises       Education provided:   - home exercise program review, education on DAYRON precautions.     Written Home Exercises Provided: Pt instructed to continue prior HEP. Exercises were reviewed and Ant was able to demonstrate them prior to the end of the session.  Ant demonstrated good  understanding of the education provided. See Electronic Medical Record under Patient Instructions for exercises provided during therapy sessions    Assessment       Patient is reporting less pain in the Right Hip. He states it is finally staring to feel better. He does still have some tightness in the Left Hip/IT band area. We are stretching this for him and he feels like it is getting a little more flexible. Working with patient to increase his strength in seated, supine, and standing positions as listed above. He is able to perform all exercises but requires frequent rest breaks. Therapist increased weight with the supine exercises to 3 lbs. This was tough but he managed to perform these with taking a break every 5th rep. Therapist also added the theraband to the sidelying clams. We plan to continue to progress him as tolerated. Sup Visit performed today with QAMAR Diaz, QAMAR Galindo, and QAMAR Powers.  All goals and treatment plan reviewed. Will work toward completion of all goals set.          PMH:  Ant is  a 64 y.o. male referred to outpatient Physical Therapy with a medical diagnosis of Right TKA. Ant reports:  he had long standing pain in the Right Hip. 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 Patient elected to have surgery and underwent Right DAYRON surgery on 9/30/2024. He did have home health Physical Therapy until 11/12/2024. He had follow up appointment with Orthopedic Surgeon on 11/13/2024. MD wants patient to work on increasing Hip Strength and normalizing gait. MD ordered Outpatient Physical Therapy and patient is here today for the Evaluation.   PMH : He has history of Left Hip Labrum Repair in 2020.   Patient presents with decreased Right Hip Strength and Range of Motion as expected following DAYRON. He has decreased Range of Motion in all directions and remains under the DAYRON precautions. Strength is reduced especially with Hip Abduction. Patient has difficulty with transfers. Patient transfers weight to Left Lower Extremity with sit to stand. He especially has difficulty standing from low surfaces. During ambulation, patient is noted to have decreased stance time and antalgic gait on the Right. Patient has decreased heel strike and hits foot flat with initial contact on the Right. Patient has decreased step/stride length resulting in slow brook. He is unable to perform stair negotiation at this time. Patient has poor balance at this time and is unable to perform single leg stance.   Patient has history of Left Hip Labrum Repair. Now due to the recent Right Hip surgery he is having new onset Left IT Band pain and popping around the knee.  Therapist will address both Hips during rehabilitation.   Therapist initiated the basic Home Exercise Program for Hip which includes : sit to stand, standing marches, standing hip abduction,  Bridges, Clams, Supine SLR, and Supine Hip Abduction.   Patient will benefit from skilled Physical Therapy intervention to address all deficits and help patient to return to  their prior level of function.      Ant Is progressing well towards his goals.   Patient prognosis is Good.     Patient will continue to benefit from skilled outpatient physical therapy to address the deficits listed in the problem list box on initial evaluation, provide pt/family education and to maximize pt's level of independence in the home and community environment.     Patient's spiritual, cultural and educational needs considered and pt agreeable to plan of care and goals.     Anticipated barriers to physical therapy: none    Goals:  Short Term Goals: 6 weeks   1. Independent with Home Exercise Program   2. Increase Right Hip Range of Motion to Within Normal Limits for a DAYRON   3. Increase Right Hip Strength to grossly 4/5  4. Patient will ambulate 500 feet with stance time approximately equal from Right to Left and with pain reports at Less than or equal to 4/10   5. Patient will be able to perform stair negotiation with step-to gait pattern      Long Term Goals: 12 weeks   1. Patient will increase Right Hip Strength to grossly 4+/5  2. Patient will ambulate 1000+ feet with good step/stride length and with no complaints of pain or weakness in Right Hip    Plan     Plan of care Certification: 11/14/2024 to 2/7/2025.     Outpatient Physical Therapy 2 times weekly for 12 weeks to include the following interventions: 39478 [therapeutic exercise], 63258 [neuromuscular re-education], 33190 [gait training], 11652 [manual therapy], 24290 [therapeutic activities], and 96187 [unattended electrical stimulation]    SUE WAGGONER, PT, DPT   11/25/2024

## 2024-12-04 ENCOUNTER — CLINICAL SUPPORT (OUTPATIENT)
Dept: REHABILITATION | Facility: HOSPITAL | Age: 64
End: 2024-12-04
Payer: OTHER GOVERNMENT

## 2024-12-04 DIAGNOSIS — R29.898 WEAKNESS OF RIGHT LEG: ICD-10-CM

## 2024-12-04 DIAGNOSIS — Z96.641 STATUS POST TOTAL HIP REPLACEMENT, RIGHT: Primary | ICD-10-CM

## 2024-12-04 PROCEDURE — 97110 THERAPEUTIC EXERCISES: CPT

## 2024-12-04 PROCEDURE — 97530 THERAPEUTIC ACTIVITIES: CPT

## 2024-12-04 NOTE — PROGRESS NOTES
[facility]   Physical Therapy Treatment Note      Name: Ant Webber  Clinic Number: [id number]    Therapy Diagnosis:   Encounter Diagnoses   Name Primary?    Status post total hip replacement, right Yes    Weakness of right leg        Physician: Herson Mcintosh MD    Visit Date: 12/4/2024     Physician Orders: PT Eval and Treat   Medical Diagnosis from Referral: Right Hip DAYRON   Evaluation Date: 11/14/2024  Authorization Period Expiration: 3/29/2025  Plan of Care Expiration: 2/7/2025    Visit # / Visits authorized: 4/ 15   PTA Visit #: 2/5   FOTO: 33/100     Precautions: DAYRON Precautions       Time In: 1:01 pm  Time Out: 1:46 pm  Total Appointment Time (timed & untimed codes): 45 minutes       Subjective     Patient reports: he is moving better and feeling better  He was compliant with home exercise program.  Response to previous treatment: soreness in the hip  Functional change: n/a    Pain: 1-2/10  Location: right hip      Objective      Objective Measures updated at progress report unless specified.     Treatment     Ant received the treatments listed below:      therapeutic exercises to develop strength, endurance, ROM, and flexibility for 20 minutes including:    NuStep: 5 minutes   SB 4 x 15 second hold   Hamstring Stretch on Stairs 4 x 15 second hold on 1st step to remain within DAYRON precautions    Iliotibial band stretch on LLE in supine with green strap 4 x 15 seconds     Forward Step Ups on stairs 2 x 10 (B)    Seated :  Marching   Hip Abduction x 20 with green band   Hip Adduction 20x5sh with soccer ball   LAQ's x 20 (RLE) with 4# cuff wt    Supine :   Heel Slides   Straight Leg Raises x 20 with 3# wt  Bridges 2 x 10 with blue band   Bridges with Abduction 2 x 10 with blue Theraband   Hooklying Hip ABD/ADD combo x 20 with 5sh squeeze into ADD and then abduction with blue Theraband   Supine Hip Abduction 2 x 10 with 3# wt  Clams in Sidelying  2 x 10 (with pillow between knees) and  green theraband - NTV  Sidelying hip abduction 2 x 10 (with pillow between knees) and green theraband - NTV    manual therapy techniques: - were applied to the: - for - minutes, including:    neuromuscular re-education activities to improve: Balance, Coordination, Kinesthetic, Sense, and Proprioception for - minutes. The following activities were included:    therapeutic activities to improve functional performance for 25  minutes, including:    Cybex Machines :   Cybex Leg Press Bilateral 2 x 10 with 6 plates   Cybex Leg Press Single (B) 2 x 10 each with 4 plates   Cybex Hamstring Curls 3 x 10 with 4 plates   Cybex Hip Abduction (B) 2 x 10 with 2 plates   Cybex Hip Flexion (B) 2 x 10 with 2 plates   Cybex Hip Extension (B) 2 x 10 with 2 plates         Standing at the Rail   Sit to Stand from 24inch black box x 20  Standing Marches x 20 (B)  Hip Abduction - Bilateral x 20 each  Heel Raises x 30   Standing hip extension at rail x 20 (B)    Patient Education and Home Exercises       Education provided:   - home exercise program review, education on DAYRON precautions.     Written Home Exercises Provided: Pt instructed to continue prior HEP. Exercises were reviewed and Ant was able to demonstrate them prior to the end of the session.  Ant demonstrated good  understanding of the education provided. See Electronic Medical Record under Patient Instructions for exercises provided during therapy sessions    Assessment       Patient is doing very well and reporting less pain in the Right Hip. Therapist upgraded his Plan of Care today. Therapist added multiple Cybex Machines to him today as well as forward step ups on a 4 inch step. He did well with this but does fatigue easily. Therapist had him take multiple rest breaks. Patient is showing good improvement with strength and mobility. He is ambulating without an assistive device and is more functional with all of his mobility. He is still having some Left IT band pain but  this is improving. Therapist performed IT Band stretch and some cross fiber massage to the IT Band. We plan to continue to progress him as tolerated. Sup Visit performed today with QAMAR Diaz, QAMAR Galindo, and QAMAR Powers.  All goals and treatment plan reviewed. Will work toward completion of all goals set.                PMH:  Ant is a 64 y.o. male referred to outpatient Physical Therapy with a medical diagnosis of Right TKA. Ant reports:  he had long standing pain in the Right Hip. 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 Patient elected to have surgery and underwent Right DAYRON surgery on 9/30/2024. He did have home health Physical Therapy until 11/12/2024. He had follow up appointment with Orthopedic Surgeon on 11/13/2024. MD wants patient to work on increasing Hip Strength and normalizing gait. MD ordered Outpatient Physical Therapy and patient is here today for the Evaluation.   PMH : He has history of Left Hip Labrum Repair in 2020.   Patient presents with decreased Right Hip Strength and Range of Motion as expected following DAYRON. He has decreased Range of Motion in all directions and remains under the DAYRON precautions. Strength is reduced especially with Hip Abduction. Patient has difficulty with transfers. Patient transfers weight to Left Lower Extremity with sit to stand. He especially has difficulty standing from low surfaces. During ambulation, patient is noted to have decreased stance time and antalgic gait on the Right. Patient has decreased heel strike and hits foot flat with initial contact on the Right. Patient has decreased step/stride length resulting in slow brook. He is unable to perform stair negotiation at this time. Patient has poor balance at this time and is unable to perform single leg stance.   Patient has history of Left Hip Labrum Repair. Now due to the recent Right Hip surgery he is having new onset Left IT Band pain and popping around the knee.   Therapist will address both Hips during rehabilitation.   Therapist initiated the basic Home Exercise Program for Hip which includes : sit to stand, standing marches, standing hip abduction,  Bridges, Clams, Supine SLR, and Supine Hip Abduction.   Patient will benefit from skilled Physical Therapy intervention to address all deficits and help patient to return to their prior level of function.      Ant Is progressing well towards his goals.   Patient prognosis is Good.     Patient will continue to benefit from skilled outpatient physical therapy to address the deficits listed in the problem list box on initial evaluation, provide pt/family education and to maximize pt's level of independence in the home and community environment.     Patient's spiritual, cultural and educational needs considered and pt agreeable to plan of care and goals.     Anticipated barriers to physical therapy: none    Goals:  Short Term Goals: 6 weeks   1. Independent with Home Exercise Program   2. Increase Right Hip Range of Motion to Within Normal Limits for a DAYRON   3. Increase Right Hip Strength to grossly 4/5  4. Patient will ambulate 500 feet with stance time approximately equal from Right to Left and with pain reports at Less than or equal to 4/10   5. Patient will be able to perform stair negotiation with step-to gait pattern      Long Term Goals: 12 weeks   1. Patient will increase Right Hip Strength to grossly 4+/5  2. Patient will ambulate 1000+ feet with good step/stride length and with no complaints of pain or weakness in Right Hip    Plan     Plan of care Certification: 11/14/2024 to 2/7/2025.     Outpatient Physical Therapy 2 times weekly for 12 weeks to include the following interventions: 30303 [therapeutic exercise], 84193 [neuromuscular re-education], 50804 [gait training], 68758 [manual therapy], 32332 [therapeutic activities], and 65207 [unattended electrical stimulation]    SUE WAGGONER, PT, DPT   12/4/2024

## 2024-12-05 ENCOUNTER — OFFICE VISIT (OUTPATIENT)
Dept: FAMILY MEDICINE | Facility: CLINIC | Age: 64
End: 2024-12-05
Payer: OTHER GOVERNMENT

## 2024-12-05 VITALS
RESPIRATION RATE: 20 BRPM | DIASTOLIC BLOOD PRESSURE: 80 MMHG | OXYGEN SATURATION: 94 % | HEIGHT: 71 IN | HEART RATE: 84 BPM | BODY MASS INDEX: 42.7 KG/M2 | WEIGHT: 305 LBS | SYSTOLIC BLOOD PRESSURE: 134 MMHG | TEMPERATURE: 98 F

## 2024-12-05 DIAGNOSIS — J10.1 INFLUENZA A: ICD-10-CM

## 2024-12-05 DIAGNOSIS — R05.9 COUGH, UNSPECIFIED TYPE: Primary | ICD-10-CM

## 2024-12-05 DIAGNOSIS — R50.9 FEVER, UNSPECIFIED FEVER CAUSE: ICD-10-CM

## 2024-12-05 DIAGNOSIS — R09.81 NASAL CONGESTION: ICD-10-CM

## 2024-12-05 LAB
CTP QC/QA: YES
CTP QC/QA: YES
POC MOLECULAR INFLUENZA A AGN: POSITIVE
POC MOLECULAR INFLUENZA B AGN: NEGATIVE
SARS-COV-2 RDRP RESP QL NAA+PROBE: NEGATIVE

## 2024-12-05 RX ORDER — OSELTAMIVIR PHOSPHATE 75 MG/1
75 CAPSULE ORAL 2 TIMES DAILY
Qty: 10 CAPSULE | Refills: 0 | Status: SHIPPED | OUTPATIENT
Start: 2024-12-05 | End: 2024-12-10

## 2024-12-05 NOTE — PROGRESS NOTES
"Subjective:       Ant Webber is a 64 y.o. male who presents for evaluation of symptoms of a URI. Symptoms include bilateral ear pressure/pain, congestion, cough described as productive, and fever 100 . Onset of symptoms was 2 days ago, and has been gradually worsening since that time. Treatment to date:  OTC cold and flu .    Review of Systems  Pertinent items are noted in HPI.     Objective:      /80 (BP Location: Left arm, Patient Position: Sitting)   Pulse 84   Temp 98.4 °F (36.9 °C)   Resp 20   Ht 5' 10.98" (1.803 m)   Wt (!) 138.3 kg (305 lb)   SpO2 (!) 94%   BMI 42.56 kg/m²   General appearance: alert, appears stated age, and cooperative  Head: Normocephalic, without obvious abnormality, atraumatic  Eyes: conjunctivae/corneas clear. PERRL, EOM's intact. Fundi benign.  Ears: abnormal TM right ear - dull and abnormal TM left ear - dull  Nose: Nares normal. Septum midline. Mucosa normal. No drainage or sinus tenderness., moderate congestion  Throat: abnormal findings: mild oropharyngeal erythema and PND  Lungs: clear to auscultation bilaterally  Heart: regular rate and rhythm, S1, S2 normal, no murmur, click, rub or gallop  Extremities: extremities normal, atraumatic, no cyanosis or edema  Skin: Skin color, texture, turgor normal. No rashes or lesions  Lymph nodes: Cervical, supraclavicular, and axillary nodes normal.  Neurologic: Alert and oriented X 3, normal strength and tone. Normal symmetric reflexes. Normal coordination and gait     Assessment:      influenza     Plan:      Discussed diagnosis and treatment of URI.  Suggested symptomatic OTC remedies.  Nasal saline spray for congestion.  Follow up as needed.  Monitor for s/s of pneumonia. rTC as needed.   "

## 2024-12-09 ENCOUNTER — CLINICAL SUPPORT (OUTPATIENT)
Dept: REHABILITATION | Facility: HOSPITAL | Age: 64
End: 2024-12-09
Payer: OTHER GOVERNMENT

## 2024-12-09 DIAGNOSIS — Z96.641 STATUS POST TOTAL HIP REPLACEMENT, RIGHT: Primary | ICD-10-CM

## 2024-12-09 DIAGNOSIS — R29.898 WEAKNESS OF RIGHT LEG: ICD-10-CM

## 2024-12-09 PROCEDURE — 97110 THERAPEUTIC EXERCISES: CPT

## 2024-12-09 PROCEDURE — 97530 THERAPEUTIC ACTIVITIES: CPT

## 2024-12-09 NOTE — PROGRESS NOTES
[facility]   Physical Therapy Treatment Note      Name: Ant Webber  Clinic Number: [id number]    Therapy Diagnosis:   No diagnosis found.      Physician: Herson Mcintosh MD    Visit Date: 12/9/2024     Physician Orders: PT Eval and Treat   Medical Diagnosis from Referral: Right Hip DAYRON   Evaluation Date: 11/14/2024  Authorization Period Expiration: 3/29/2025  Plan of Care Expiration: 2/7/2025    Visit # / Visits authorized: 5/ 15   PTA Visit #: 2/5   FOTO: 33/100     Precautions: DAYRON Precautions       Time In: 10:35 am  Time Out: 11:16 am  Total Appointment Time (timed & untimed codes): 40 minutes       Subjective     Patient reports: he had the flu last week. It started on Wednesday night. He went to the clinic on Thursday and they confirmed the Flu. He is still really weak and requests we take it easy today.   He was compliant with home exercise program.  Response to previous treatment: soreness in the hip  Functional change: n/a    Pain: 2/10  Location: right hip      Objective      Objective Measures updated at progress report unless specified.     Treatment     Ant received the treatments listed below:      therapeutic exercises to develop strength, endurance, ROM, and flexibility for 15 minutes including:    NuStep: 5 minutes   SB 4 x 15 second hold   Hamstring Stretch on Stairs 4 x 15 second hold on 1st step to remain within DAYRON precautions    Iliotibial band stretch on LLE in supine with green strap 4 x 15 seconds     Forward Step Ups on stairs 2 x 10 (B)    Seated :  Marching   Hip Abduction x 20 with green band   Hip Adduction 20x5sh with soccer ball   LAQ's x 20 (RLE) with 4# cuff wt    Supine :   Heel Slides   Straight Leg Raises x 20 with 3# wt  Bridges 2 x 10 with blue band   Bridges with Abduction 2 x 10 with blue Theraband   Hooklying Hip ABD/ADD combo x 20 with 5sh squeeze into ADD and then abduction with blue Theraband   Supine Hip Abduction 2 x 10 with 3# wt  Clams in  "Sidelying  2 x 10 (with pillow between knees) and green theraband - NTV  Sidelying hip abduction 2 x 10 (with pillow between knees) and green theraband - NTV    manual therapy techniques: - were applied to the: - for - minutes, including:    neuromuscular re-education activities to improve: Balance, Coordination, Kinesthetic, Sense, and Proprioception for - minutes. The following activities were included:    therapeutic activities to improve functional performance for 25 minutes, including:    Cybex Machines :   Cybex Leg Press Bilateral 2 x 10 with 6 plates   Cybex Leg Press Single (B) 2 x 10 each with 4 plates   Cybex Hamstring Curls 3 x 10 with 4 plates   Cybex Hip Abduction (B) 2 x 10 with 2 plates   Cybex Hip Flexion (B) 2 x 10 with 2 plates   Cybex Hip Extension (B) 2 x 10 with 2 plates         Standing at the Rail   Sit to Stand from 24inch black box x 20  Standing Marches x 20 (B)  Hip Abduction - Bilateral x 20 each  Heel Raises x 30   Standing hip extension at rail x 20 (B)    Patient Education and Home Exercises       Education provided:   - home exercise program review, education on DAYRON precautions.     Written Home Exercises Provided: Pt instructed to continue prior HEP. Exercises were reviewed and Ant was able to demonstrate them prior to the end of the session.  Ant demonstrated good  understanding of the education provided. See Electronic Medical Record under Patient Instructions for exercises provided during therapy sessions    Assessment       Patient reports he had the flu last week. It started on Wednesday night. He went to the clinic on Thursday and they confirmed the Flu. He is still really weak and requests we take it easy today.   Therapist had him warm up and stretch. Therapist had him perform Cybex machines today but allowed multiple rest breaks. He has increased shortness of breath and fatigue today. Therapist monitored him throughout the session to make sure he did not "over-do it." He " tolerated today well even with the increased fatigue. Strength and mobility continue to improve. We plan to progress him as tolerated. Sup Visit performed today with QAMAR Diaz, QAMAR Galindo, and QAMAR Powers.  All goals and treatment plan reviewed. Will work toward completion of all goals set.                  PMH:  Ant is a 64 y.o. male referred to outpatient Physical Therapy with a medical diagnosis of Right TKA. Ant reports:  he had long standing pain in the Right Hip. 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 Patient elected to have surgery and underwent Right DAYRON surgery on 9/30/2024. He did have home health Physical Therapy until 11/12/2024. He had follow up appointment with Orthopedic Surgeon on 11/13/2024. MD wants patient to work on increasing Hip Strength and normalizing gait. MD ordered Outpatient Physical Therapy and patient is here today for the Evaluation.   PMH : He has history of Left Hip Labrum Repair in 2020.   Patient presents with decreased Right Hip Strength and Range of Motion as expected following DAYRON. He has decreased Range of Motion in all directions and remains under the DAYRON precautions. Strength is reduced especially with Hip Abduction. Patient has difficulty with transfers. Patient transfers weight to Left Lower Extremity with sit to stand. He especially has difficulty standing from low surfaces. During ambulation, patient is noted to have decreased stance time and antalgic gait on the Right. Patient has decreased heel strike and hits foot flat with initial contact on the Right. Patient has decreased step/stride length resulting in slow brook. He is unable to perform stair negotiation at this time. Patient has poor balance at this time and is unable to perform single leg stance.   Patient has history of Left Hip Labrum Repair. Now due to the recent Right Hip surgery he is having new onset Left IT Band pain and popping around the knee.  Therapist will  address both Hips during rehabilitation.   Therapist initiated the basic Home Exercise Program for Hip which includes : sit to stand, standing marches, standing hip abduction,  Bridges, Clams, Supine SLR, and Supine Hip Abduction.   Patient will benefit from skilled Physical Therapy intervention to address all deficits and help patient to return to their prior level of function.      Ant Is progressing well towards his goals.   Patient prognosis is Good.     Patient will continue to benefit from skilled outpatient physical therapy to address the deficits listed in the problem list box on initial evaluation, provide pt/family education and to maximize pt's level of independence in the home and community environment.     Patient's spiritual, cultural and educational needs considered and pt agreeable to plan of care and goals.     Anticipated barriers to physical therapy: none    Goals:  Short Term Goals: 6 weeks   1. Independent with Home Exercise Program   2. Increase Right Hip Range of Motion to Within Normal Limits for a DAYRON   3. Increase Right Hip Strength to grossly 4/5  4. Patient will ambulate 500 feet with stance time approximately equal from Right to Left and with pain reports at Less than or equal to 4/10   5. Patient will be able to perform stair negotiation with step-to gait pattern      Long Term Goals: 12 weeks   1. Patient will increase Right Hip Strength to grossly 4+/5  2. Patient will ambulate 1000+ feet with good step/stride length and with no complaints of pain or weakness in Right Hip    Plan     Plan of care Certification: 11/14/2024 to 2/7/2025.     Outpatient Physical Therapy 2 times weekly for 12 weeks to include the following interventions: 63059 [therapeutic exercise], 58977 [neuromuscular re-education], 97183 [gait training], 03161 [manual therapy], 70567 [therapeutic activities], and 76386 [unattended electrical stimulation]    SUE WAGGONER, PT, DPT   12/9/2024

## 2024-12-11 ENCOUNTER — CLINICAL SUPPORT (OUTPATIENT)
Dept: REHABILITATION | Facility: HOSPITAL | Age: 64
End: 2024-12-11
Payer: OTHER GOVERNMENT

## 2024-12-11 DIAGNOSIS — R29.898 WEAKNESS OF RIGHT LEG: ICD-10-CM

## 2024-12-11 DIAGNOSIS — Z96.641 STATUS POST TOTAL HIP REPLACEMENT, RIGHT: Primary | ICD-10-CM

## 2024-12-11 PROCEDURE — 97530 THERAPEUTIC ACTIVITIES: CPT | Mod: CQ

## 2024-12-11 PROCEDURE — 97110 THERAPEUTIC EXERCISES: CPT | Mod: CQ

## 2024-12-11 NOTE — PROGRESS NOTES
[facility]   Physical Therapy Treatment Note      Name: Ant Webber  Clinic Number: [id number]    Therapy Diagnosis:   Encounter Diagnoses   Name Primary?    Status post total hip replacement, right Yes    Weakness of right leg      Physician: Herson Mcintosh MD    Visit Date: 12/11/2024     Physician Orders: PT Eval and Treat   Medical Diagnosis from Referral: Right Hip DAYRON   Evaluation Date: 11/14/2024  Authorization Period Expiration: 3/29/2025  Plan of Care Expiration: 2/7/2025    Visit # / Visits authorized: 6/ 15   PTA Visit #: 1/5   FOTO: 33/100     Precautions: DAYRON Precautions       Time In: 11:30 am  Time Out: 12:09 pm  Total Appointment Time (timed & untimed codes): 39 minutes     Subjective     Patient reports: he is feeling a little better than he was on Monday.  Still a little fatigued from having the flu.  He was compliant with home exercise program.  Response to previous treatment: soreness in the hip  Functional change: n/a    Pain: 1/10  Location: right hip      Objective      Objective Measures updated at progress report unless specified.     Treatment     Ant received the treatments listed below:      therapeutic exercises to develop strength, endurance, ROM, and flexibility for 14 minutes including:    NuStep: 5 minutes   SB 4 x 15 second hold   Hamstring Stretch on Stairs 4 x 15 second hold - remained within DAYRON precautions    Iliotibial band stretch on LLE in supine with green strap 4 x 15 seconds  (not performed today)     Forward Step Ups on stairs 2 x 10 (B) 6inch step     Seated :  Marching   Hip Abduction x 20 with green band   Hip Adduction 20x5sh with soccer ball   LAQ's x 20 (RLE) with 4# cuff wt      manual therapy techniques: - were applied to the: - for - minutes, including:    neuromuscular re-education activities to improve: Balance, Coordination, Kinesthetic, Sense, and Proprioception for - minutes. The following activities were included:    therapeutic  "activities to improve functional performance for 25 minutes, including:    Cybex Machines :   Cybex Leg Press Bilateral 2 x 10 with 6 plates   Cybex Leg Press Single (B) 2 x 10 each with 4 plates   Cybex Hamstring Curls 3 x 10 with 4 plates   Cybex Hip Abduction (B) 2 x 10 with 2 plates   Cybex Hip Flexion (B) 2 x 10 with 2 plates   Cybex Hip Extension (B) 2 x 10 with 2 plates   Sit to Stand x 20    Patient Education and Home Exercises       Education provided:   - home exercise program review, education on DAYRON precautions.     Written Home Exercises Provided: Pt instructed to continue prior HEP. Exercises were reviewed and Ant was able to demonstrate them prior to the end of the session.  Ant demonstrated good  understanding of the education provided. See Electronic Medical Record under Patient Instructions for exercises provided during therapy sessions    Assessment       Patient reports he is feeling a little better today. He is still pretty fatigued from being sick with the flu last week. Therapist had him warm up and stretch. Therapist had him perform Cybex machines today but allowed multiple rest breaks again today. He is still struggling to regain his stamina from being sick. Therapist monitored him throughout the session to make sure he did not "over-do it." He tolerated today well even with the increased fatigue. Strength and mobility continue to improve. We plan to progress him as tolerated.         PMH:  Ant is a 64 y.o. male referred to outpatient Physical Therapy with a medical diagnosis of Right TKA. Ant reports:  he had long standing pain in the Right Hip. 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 Patient elected to have surgery and underwent Right DAYRON surgery on 9/30/2024. He did have home health Physical Therapy until 11/12/2024. He had follow up appointment with Orthopedic Surgeon on 11/13/2024. MD wants patient to work on increasing Hip Strength and normalizing gait. MD ordered " Outpatient Physical Therapy and patient is here today for the Evaluation.   PMH : He has history of Left Hip Labrum Repair in 2020.   Patient presents with decreased Right Hip Strength and Range of Motion as expected following DAYRON. He has decreased Range of Motion in all directions and remains under the DAYRON precautions. Strength is reduced especially with Hip Abduction. Patient has difficulty with transfers. Patient transfers weight to Left Lower Extremity with sit to stand. He especially has difficulty standing from low surfaces. During ambulation, patient is noted to have decreased stance time and antalgic gait on the Right. Patient has decreased heel strike and hits foot flat with initial contact on the Right. Patient has decreased step/stride length resulting in slow brook. He is unable to perform stair negotiation at this time. Patient has poor balance at this time and is unable to perform single leg stance.   Patient has history of Left Hip Labrum Repair. Now due to the recent Right Hip surgery he is having new onset Left IT Band pain and popping around the knee.  Therapist will address both Hips during rehabilitation.   Therapist initiated the basic Home Exercise Program for Hip which includes : sit to stand, standing marches, standing hip abduction,  Bridges, Clams, Supine SLR, and Supine Hip Abduction.   Patient will benefit from skilled Physical Therapy intervention to address all deficits and help patient to return to their prior level of function.      Ant Is progressing well towards his goals.   Patient prognosis is Good.     Patient will continue to benefit from skilled outpatient physical therapy to address the deficits listed in the problem list box on initial evaluation, provide pt/family education and to maximize pt's level of independence in the home and community environment.     Patient's spiritual, cultural and educational needs considered and pt agreeable to plan of care and goals.      Anticipated barriers to physical therapy: none    Goals:  Short Term Goals: 6 weeks   1. Independent with Home Exercise Program   2. Increase Right Hip Range of Motion to Within Normal Limits for a DAYRON   3. Increase Right Hip Strength to grossly 4/5  4. Patient will ambulate 500 feet with stance time approximately equal from Right to Left and with pain reports at Less than or equal to 4/10   5. Patient will be able to perform stair negotiation with step-to gait pattern      Long Term Goals: 12 weeks   1. Patient will increase Right Hip Strength to grossly 4+/5  2. Patient will ambulate 1000+ feet with good step/stride length and with no complaints of pain or weakness in Right Hip    Plan     Plan of care Certification: 11/14/2024 to 2/7/2025.     Outpatient Physical Therapy 2 times weekly for 12 weeks to include the following interventions: 08846 [therapeutic exercise], 54144 [neuromuscular re-education], 54491 [gait training], 93229 [manual therapy], 21848 [therapeutic activities], and 13220 [unattended electrical stimulation]    Santo Flores PTA  12/11/2024

## 2024-12-16 ENCOUNTER — CLINICAL SUPPORT (OUTPATIENT)
Dept: REHABILITATION | Facility: HOSPITAL | Age: 64
End: 2024-12-16
Payer: OTHER GOVERNMENT

## 2024-12-16 DIAGNOSIS — Z96.641 STATUS POST TOTAL HIP REPLACEMENT, RIGHT: Primary | ICD-10-CM

## 2024-12-16 DIAGNOSIS — R29.898 WEAKNESS OF RIGHT LEG: ICD-10-CM

## 2024-12-16 PROCEDURE — 97530 THERAPEUTIC ACTIVITIES: CPT | Mod: CQ

## 2024-12-16 PROCEDURE — 97110 THERAPEUTIC EXERCISES: CPT | Mod: CQ

## 2024-12-16 NOTE — PROGRESS NOTES
[facility]   Physical Therapy Treatment Note      Name: Ant Webber  [facility] Number: [id number]    Therapy Diagnosis:   Encounter Diagnoses   Name Primary?    Status post total hip replacement, right Yes    Weakness of right leg      Physician: Herson Mcintosh MD    Visit Date: 12/16/2024     Physician Orders: PT Eval and Treat   Medical Diagnosis from Referral: Right Hip DAYRON   Evaluation Date: 11/14/2024  Authorization Period Expiration: 3/29/2025  Plan of Care Expiration: 2/7/2025    Visit # / Visits authorized: 6/ 15   PTA Visit #: 1/5   FOTO: 33/100     Precautions: DAYRON Precautions       Time In: 1:00 pm  Time Out: 1:40 pm  Total Appointment Time (timed & untimed codes): 25 billable minutes     Subjective     Patient reports: doing well; some anterior hip pain   He was compliant with home exercise program.  Response to previous treatment: soreness in the hip  Functional change: n/a    Pain: 1/10  Location: right hip      Objective      Objective Measures updated at progress report unless specified.     Treatment     Ant received the treatments listed below:      therapeutic exercises to develop strength, endurance, ROM, and flexibility for 10 minutes including:    NuStep: 5 minutes   SB 4 x 15 second hold   Hamstring Stretch on Stairs 4 x 15 second hold - remained within DAYRON precautions  Hip Flexor Stretch @ Stairs 4x15 sec hold    Iliotibial band stretch on LLE in supine with green strap 4 x 15 seconds  (not performed today)     Forward Step Ups on stairs 2 x 10 (B) 6inch step     Seated :  Marching   Hip Abduction x 20 with green band   Hip Adduction 20x5sh with soccer ball   LAQ's x 20 (RLE) with 4# cuff wt    Bridges 2 x 10 with blue band   Clams in Sidelying  2 x 10 blue band     manual therapy techniques: - were applied to the: - for - minutes, including:    neuromuscular re-education activities to improve: Balance, Coordination, Kinesthetic, Sense, and Proprioception for -  minutes. The following activities were included:    therapeutic activities to improve functional performance for 15 minutes, including:    Cybex Machines :   Cybex Leg Press Bilateral 2 x 10 with 6 plates   Cybex Leg Press Single (B) 2 x 10 each with 4 plates   Cybex Hamstring Curls 3 x 10 with 4 plates   Cybex Hip Abduction (B) 2 x 10 with 2 plates   Cybex Hip Flexion (B) 2 x 10 with 2 plates   Cybex Hip Extension (B) 2 x 10 with 2 plates   Sit to Stand x 20    Patient Education and Home Exercises       Education provided:   - home exercise program review, education on DAYRON precautions.     Written Home Exercises Provided: Pt instructed to continue prior HEP. Exercises were reviewed and Ant was able to demonstrate them prior to the end of the session.  Ant demonstrated good  understanding of the education provided. See Electronic Medical Record under Patient Instructions for exercises provided during therapy sessions    Assessment       Pt doing well with just complaints of anterior hip pain and tightness. Introduced hip flexor stretching today with decreased tightness. Continued with hip strengthening exercises as tolerated. Pt making good progress towards goals. Time billed reflects time spent one on one with pt.         PMH:  Ant is a 64 y.o. male referred to outpatient Physical Therapy with a medical diagnosis of Right TKA. Ant reports:  he had long standing pain in the Right Hip. 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 Patient elected to have surgery and underwent Right DAYRON surgery on 9/30/2024. He did have home health Physical Therapy until 11/12/2024. He had follow up appointment with Orthopedic Surgeon on 11/13/2024. MD wants patient to work on increasing Hip Strength and normalizing gait. MD ordered Outpatient Physical Therapy and patient is here today for the Evaluation.   PMH : He has history of Left Hip Labrum Repair in 2020.   Patient presents with decreased Right Hip Strength and Range of  Motion as expected following DAYRON. He has decreased Range of Motion in all directions and remains under the DAYRON precautions. Strength is reduced especially with Hip Abduction. Patient has difficulty with transfers. Patient transfers weight to Left Lower Extremity with sit to stand. He especially has difficulty standing from low surfaces. During ambulation, patient is noted to have decreased stance time and antalgic gait on the Right. Patient has decreased heel strike and hits foot flat with initial contact on the Right. Patient has decreased step/stride length resulting in slow brook. He is unable to perform stair negotiation at this time. Patient has poor balance at this time and is unable to perform single leg stance.   Patient has history of Left Hip Labrum Repair. Now due to the recent Right Hip surgery he is having new onset Left IT Band pain and popping around the knee.  Therapist will address both Hips during rehabilitation.   Therapist initiated the basic Home Exercise Program for Hip which includes : sit to stand, standing marches, standing hip abduction,  Bridges, Clams, Supine SLR, and Supine Hip Abduction.   Patient will benefit from skilled Physical Therapy intervention to address all deficits and help patient to return to their prior level of function.      Ant Is progressing well towards his goals.   Patient prognosis is Good.     Patient will continue to benefit from skilled outpatient physical therapy to address the deficits listed in the problem list box on initial evaluation, provide pt/family education and to maximize pt's level of independence in the home and community environment.     Patient's spiritual, cultural and educational needs considered and pt agreeable to plan of care and goals.     Anticipated barriers to physical therapy: none    Goals:  Short Term Goals: 6 weeks   1. Independent with Home Exercise Program   2. Increase Right Hip Range of Motion to Within Normal Limits for a DAYRON    3. Increase Right Hip Strength to grossly 4/5  4. Patient will ambulate 500 feet with stance time approximately equal from Right to Left and with pain reports at Less than or equal to 4/10   5. Patient will be able to perform stair negotiation with step-to gait pattern      Long Term Goals: 12 weeks   1. Patient will increase Right Hip Strength to grossly 4+/5  2. Patient will ambulate 1000+ feet with good step/stride length and with no complaints of pain or weakness in Right Hip    Plan     Plan of care Certification: 11/14/2024 to 2/7/2025.     Outpatient Physical Therapy 2 times weekly for 12 weeks to include the following interventions: 84020 [therapeutic exercise], 03103 [neuromuscular re-education], 63481 [gait training], 33846 [manual therapy], 34363 [therapeutic activities], and 83945 [unattended electrical stimulation]    Massimo Ortiz, VIRGIL  12/16/2024

## 2024-12-18 ENCOUNTER — CLINICAL SUPPORT (OUTPATIENT)
Dept: REHABILITATION | Facility: HOSPITAL | Age: 64
End: 2024-12-18
Payer: OTHER GOVERNMENT

## 2024-12-18 DIAGNOSIS — R29.898 WEAKNESS OF RIGHT LEG: ICD-10-CM

## 2024-12-18 DIAGNOSIS — Z96.641 STATUS POST TOTAL HIP REPLACEMENT, RIGHT: Primary | ICD-10-CM

## 2024-12-18 PROCEDURE — 97530 THERAPEUTIC ACTIVITIES: CPT | Mod: CQ

## 2024-12-18 PROCEDURE — 97110 THERAPEUTIC EXERCISES: CPT | Mod: CQ

## 2024-12-18 NOTE — PROGRESS NOTES
[facility]   Physical Therapy Treatment Note      Name: Ant QUINTANA [facility] Number: [id number]    Therapy Diagnosis:   Encounter Diagnoses   Name Primary?    Status post total hip replacement, right Yes    Weakness of right leg      Physician: Herson Mcintosh MD    Visit Date: 12/18/2024     Physician Orders: PT Eval and Treat   Medical Diagnosis from Referral: Right Hip DAYRON   Evaluation Date: 11/14/2024  Authorization Period Expiration: 3/29/2025  Plan of Care Expiration: 2/7/2025    Visit # / Visits authorized: 8/ 15   PTA Visit #: 3/5   FOTO: 33/100     Precautions: DAYRON Precautions       Time In: 11:28 am  Time Out: 12:10 pm  Total Appointment Time (timed & untimed codes): 24 billable minutes due to time spent one on one with Patient     Subjective     Patient reports: the stretching for the front of the hip he did at his last therapy session helped.   He was compliant with home exercise program.  Response to previous treatment: soreness in the hip  Functional change: n/a    Pain: 1/10  Location: right hip      Objective      Objective Measures updated at progress report unless specified.     Treatment     Ant received the treatments listed below:      therapeutic exercises to develop strength, endurance, ROM, and flexibility for 10 minutes including:    NuStep: 5 minutes   SB 4 x 15 second hold   Hamstring Stretch on Stairs 4 x 15 second hold - remained within DAYORN precautions  Hip Flexor Stretch @ Stairs 4x15 sec hold    Iliotibial band stretch on LLE in supine with green strap 4 x 15 seconds  (not performed today)     Forward Step Ups on stairs 2 x 10 (B) 6inch step     Seated :  Marching     Bridges 2 x 10 with blue band     manual therapy techniques: - were applied to the: - for - minutes, including:    neuromuscular re-education activities to improve: Balance, Coordination, Kinesthetic, Sense, and Proprioception for - minutes. The following activities were included:    therapeutic  activities to improve functional performance for 14 minutes, including:    Cybex Machines :   Cybex Leg Press Bilateral 2 x 10 with 6 plates   Cybex Leg Press Single (B) 2 x 10 each with 5 plates   Cybex Hamstring Curls 3 x 10 with 5 plates   Cybex Hip Abduction (B) 2 x 10 with 2 plates   Cybex Hip Flexion (B) 2 x 10 with 2 plates   Cybex Hip Extension (B) 2 x 10 with 2 plates   Sit to Stand x 20    Patient Education and Home Exercises       Education provided:   - home exercise program review, education on DAYRON precautions.     Written Home Exercises Provided: Pt instructed to continue prior HEP. Exercises were reviewed and Ant was able to demonstrate them prior to the end of the session.  Ant demonstrated good  understanding of the education provided. See Electronic Medical Record under Patient Instructions for exercises provided during therapy sessions    Assessment     Pt reports the right hip is feeling pretty good. The hip flexor stretch that was added at his last therapy visit helped with the tightness in the front of the hip. Continued with hip strengthening and stretching exercises as tolerated with increased weight on the cybex single leg press. Pt making good progress towards goals. Time billed reflects time spent one on one with pt.         PMH:  Ant is a 64 y.o. male referred to outpatient Physical Therapy with a medical diagnosis of Right TKA. Ant reports:  he had long standing pain in the Right Hip. 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 Patient elected to have surgery and underwent Right DAYRON surgery on 9/30/2024. He did have home health Physical Therapy until 11/12/2024. He had follow up appointment with Orthopedic Surgeon on 11/13/2024. MD wants patient to work on increasing Hip Strength and normalizing gait. MD ordered Outpatient Physical Therapy and patient is here today for the Evaluation.   PMH : He has history of Left Hip Labrum Repair in 2020.   Patient presents with decreased  Right Hip Strength and Range of Motion as expected following DAYRON. He has decreased Range of Motion in all directions and remains under the DAYRON precautions. Strength is reduced especially with Hip Abduction. Patient has difficulty with transfers. Patient transfers weight to Left Lower Extremity with sit to stand. He especially has difficulty standing from low surfaces. During ambulation, patient is noted to have decreased stance time and antalgic gait on the Right. Patient has decreased heel strike and hits foot flat with initial contact on the Right. Patient has decreased step/stride length resulting in slow brook. He is unable to perform stair negotiation at this time. Patient has poor balance at this time and is unable to perform single leg stance.   Patient has history of Left Hip Labrum Repair. Now due to the recent Right Hip surgery he is having new onset Left IT Band pain and popping around the knee.  Therapist will address both Hips during rehabilitation.   Therapist initiated the basic Home Exercise Program for Hip which includes : sit to stand, standing marches, standing hip abduction,  Bridges, Clams, Supine SLR, and Supine Hip Abduction.   Patient will benefit from skilled Physical Therapy intervention to address all deficits and help patient to return to their prior level of function.      Ant Is progressing well towards his goals.   Patient prognosis is Good.     Patient will continue to benefit from skilled outpatient physical therapy to address the deficits listed in the problem list box on initial evaluation, provide pt/family education and to maximize pt's level of independence in the home and community environment.     Patient's spiritual, cultural and educational needs considered and pt agreeable to plan of care and goals.     Anticipated barriers to physical therapy: none    Goals:  Short Term Goals: 6 weeks   1. Independent with Home Exercise Program   2. Increase Right Hip Range of Motion to  Within Normal Limits for a DAYRON   3. Increase Right Hip Strength to grossly 4/5  4. Patient will ambulate 500 feet with stance time approximately equal from Right to Left and with pain reports at Less than or equal to 4/10   5. Patient will be able to perform stair negotiation with step-to gait pattern      Long Term Goals: 12 weeks   1. Patient will increase Right Hip Strength to grossly 4+/5  2. Patient will ambulate 1000+ feet with good step/stride length and with no complaints of pain or weakness in Right Hip    Plan     Plan of care Certification: 11/14/2024 to 2/7/2025.     Outpatient Physical Therapy 2 times weekly for 12 weeks to include the following interventions: 23554 [therapeutic exercise], 71183 [neuromuscular re-education], 29273 [gait training], 61208 [manual therapy], 56587 [therapeutic activities], and 67091 [unattended electrical stimulation]    Santo Flores, VIRGIL  12/18/2024

## 2024-12-23 ENCOUNTER — CLINICAL SUPPORT (OUTPATIENT)
Dept: REHABILITATION | Facility: HOSPITAL | Age: 64
End: 2024-12-23
Payer: OTHER GOVERNMENT

## 2024-12-23 DIAGNOSIS — R29.898 WEAKNESS OF RIGHT LEG: ICD-10-CM

## 2024-12-23 DIAGNOSIS — Z96.641 STATUS POST TOTAL HIP REPLACEMENT, RIGHT: Primary | ICD-10-CM

## 2024-12-23 PROCEDURE — 97530 THERAPEUTIC ACTIVITIES: CPT | Mod: CQ

## 2024-12-23 PROCEDURE — 97110 THERAPEUTIC EXERCISES: CPT | Mod: CQ

## 2024-12-23 NOTE — PROGRESS NOTES
[facility]   Physical Therapy Treatment Note      Name: Ant Webber  Clinic Number: [id number]    Therapy Diagnosis:   Encounter Diagnoses   Name Primary?    Status post total hip replacement, right Yes    Weakness of right leg      Physician: Herson Mcintosh MD    Visit Date: 12/23/2024     Physician Orders: PT Eval and Treat   Medical Diagnosis from Referral: Right Hip DAYRON   Evaluation Date: 11/14/2024  Authorization Period Expiration: 3/29/2025  Plan of Care Expiration: 2/7/2025    Visit # / Visits authorized: 9/ 15   PTA Visit #: 4/5   FOTO: 33/100     Precautions: DAYRON Precautions       Time In: 11:29 am  Time Out: 12:15 pm  Total Appointment Time (timed & untimed codes): 44  billable minutes     Subjective     Patient reports: some soreness and stiffness in the hip especially when he first gets up from sitting down.    He was compliant with home exercise program.  Response to previous treatment: soreness in the hip  Functional change: n/a    Pain: 1/10  Location: right hip      Objective      Objective Measures updated at progress report unless specified.     Treatment     Ant received the treatments listed below:      therapeutic exercises to develop strength, endurance, ROM, and flexibility for 15 minutes including:    NuStep: 5 minutes   SB 4 x 15 second hold   Hamstring Stretch on Stairs 4 x 15 second hold - remained within DAYRON precautions  Hip Flexor Stretch @ Stairs 4x15 sec hold    Iliotibial band stretch on LLE in supine with green strap 4 x 15 seconds  (not performed today)     Forward Step Ups on stairs 3 x 10 (B) 6inch step     Seated :  Marching       manual therapy techniques: - were applied to the: - for - minutes, including:    neuromuscular re-education activities to improve: Balance, Coordination, Kinesthetic, Sense, and Proprioception for - minutes. The following activities were included:    therapeutic activities to improve functional performance for 25 minutes,  including:    Cybex Machines :   Cybex Leg Press Bilateral 3 x 10 with 6 plates   Cybex Leg Press Single (B) 3 x 10 each with 5 plates   Cybex Hamstring Curls 3 x 10 with 6 plates   Cybex Hip Abduction (B) 2 x 10 with 2 plates (used blue band on 12/23 due to cybex hip machine not available)  Cybex Hip Flexion (B) 2 x 10 with 2 plates  (used blue band on 12/23 due to cybex hip machine not available)  Cybex Hip Extension (B) 2 x 10 with 2 plates  (used blue band on 12/23 due to cybex hip machine not available)  Sit to Stand from back mat x 20 holding 10# DB    Patient Education and Home Exercises       Education provided:   - home exercise program review, education on DAYRON precautions.     Written Home Exercises Provided: Pt instructed to continue prior HEP. Exercises were reviewed and Ant was able to demonstrate them prior to the end of the session.  Ant demonstrated good  understanding of the education provided. See Electronic Medical Record under Patient Instructions for exercises provided during therapy sessions    Assessment       Pt reports the right hip is feeling pretty good, he is still having a little stiffness/soreness in it when he first stands up from sitting down. The hip flexor stretch he was shown on the step last week during therapy is helping to decrease his complaints of soreness in that anterior hip/groin area. Continued with hip strengthening and stretching exercises as tolerated with increased repetitions on the cybex leg press machine, increased weight on the cybex hamstring curls, and addition of 10# DB to sit<>stands. Pt making good progress towards goals. Encouraged Patient to continue with diligence to home exercise program and we will progress him as able when he returns.         PMH:  Ant is a 64 y.o. male referred to outpatient Physical Therapy with a medical diagnosis of Right TKA. Ant reports:  he had long standing pain in the Right Hip. 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 Patient elected to have surgery and underwent Right DAYRON surgery on 9/30/2024. He did have home health Physical Therapy until 11/12/2024. He had follow up appointment with Orthopedic Surgeon on 11/13/2024. MD wants patient to work on increasing Hip Strength and normalizing gait. MD ordered Outpatient Physical Therapy and patient is here today for the Evaluation.   PMH : He has history of Left Hip Labrum Repair in 2020.   Patient presents with decreased Right Hip Strength and Range of Motion as expected following DAYRON. He has decreased Range of Motion in all directions and remains under the DAYRON precautions. Strength is reduced especially with Hip Abduction. Patient has difficulty with transfers. Patient transfers weight to Left Lower Extremity with sit to stand. He especially has difficulty standing from low surfaces. During ambulation, patient is noted to have decreased stance time and antalgic gait on the Right. Patient has decreased heel strike and hits foot flat with initial contact on the Right. Patient has decreased step/stride length resulting in slow brook. He is unable to perform stair negotiation at this time. Patient has poor balance at this time and is unable to perform single leg stance.   Patient has history of Left Hip Labrum Repair. Now due to the recent Right Hip surgery he is having new onset Left IT Band pain and popping around the knee.  Therapist will address both Hips during rehabilitation.   Therapist initiated the basic Home Exercise Program for Hip which includes : sit to stand, standing marches, standing hip abduction,  Bridges, Clams, Supine SLR, and Supine Hip Abduction.   Patient will benefit from skilled Physical Therapy intervention to address all deficits and help patient to return to their prior level of function.      Ant Is progressing well towards his goals.   Patient prognosis is Good.     Patient will continue to benefit from skilled outpatient physical therapy to  address the deficits listed in the problem list box on initial evaluation, provide pt/family education and to maximize pt's level of independence in the home and community environment.     Patient's spiritual, cultural and educational needs considered and pt agreeable to plan of care and goals.     Anticipated barriers to physical therapy: none    Goals:  Short Term Goals: 6 weeks   1. Independent with Home Exercise Program   2. Increase Right Hip Range of Motion to Within Normal Limits for a DAYRON   3. Increase Right Hip Strength to grossly 4/5  4. Patient will ambulate 500 feet with stance time approximately equal from Right to Left and with pain reports at Less than or equal to 4/10   5. Patient will be able to perform stair negotiation with step-to gait pattern      Long Term Goals: 12 weeks   1. Patient will increase Right Hip Strength to grossly 4+/5  2. Patient will ambulate 1000+ feet with good step/stride length and with no complaints of pain or weakness in Right Hip    Plan     Plan of care Certification: 11/14/2024 to 2/7/2025.     Outpatient Physical Therapy 2 times weekly for 12 weeks to include the following interventions: 84679 [therapeutic exercise], 41718 [neuromuscular re-education], 27352 [gait training], 18808 [manual therapy], 58510 [therapeutic activities], and 44224 [unattended electrical stimulation]    Santo Flores PTA  12/23/2024

## 2024-12-24 ENCOUNTER — CLINICAL SUPPORT (OUTPATIENT)
Dept: REHABILITATION | Facility: HOSPITAL | Age: 64
End: 2024-12-24
Payer: OTHER GOVERNMENT

## 2024-12-24 DIAGNOSIS — Z96.641 STATUS POST TOTAL HIP REPLACEMENT, RIGHT: Primary | ICD-10-CM

## 2024-12-24 DIAGNOSIS — R29.898 WEAKNESS OF RIGHT LEG: ICD-10-CM

## 2024-12-24 PROCEDURE — 97110 THERAPEUTIC EXERCISES: CPT

## 2024-12-24 PROCEDURE — 97530 THERAPEUTIC ACTIVITIES: CPT

## 2024-12-24 NOTE — PROGRESS NOTES
[facility]   Physical Therapy Treatment Note      Name: Ant Webber  Clinic Number: [id number]    Therapy Diagnosis:   No diagnosis found.    Physician: Herson Mcintosh MD    Visit Date: 12/24/2024     Physician Orders: PT Eval and Treat   Medical Diagnosis from Referral: Right Hip DAYRON   Evaluation Date: 11/14/2024  Authorization Period Expiration: 3/29/2025  Plan of Care Expiration: 2/7/2025    Visit # / Visits authorized: 10/ 15   PTA Visit #: 4/5   FOTO: 33/100     Precautions: DAYRON Precautions       Time In: 10:40 am  Time Out: 11:25 am  Total Appointment Time (timed & untimed codes): 45  billable minutes     Subjective     Patient reports: soreness due to working out with Therapy yesterday.  He was compliant with home exercise program.  Response to previous treatment: soreness in the hip  Functional change: n/a    Pain: 1/10  Location: right hip      Objective      Objective Measures updated at progress report unless specified.     Treatment     Ant received the treatments listed below:      therapeutic exercises to develop strength, endurance, ROM, and flexibility for 15 minutes including:    NuStep: 5 minutes   SB 4 x 15 second hold   Hamstring Stretch on Stairs 4 x 15 second hold - remained within DAYRON precautions  Hip Flexor Stretch @ Stairs 4x15 sec hold    Iliotibial band stretch on LLE in supine with green strap 4 x 15 seconds  (not performed today)     Forward Step Ups on stairs 3 x 10 (B) 6inch step     Seated :  Marching       manual therapy techniques: - were applied to the: - for - minutes, including:    neuromuscular re-education activities to improve: Balance, Coordination, Kinesthetic, Sense, and Proprioception for - minutes. The following activities were included:    therapeutic activities to improve functional performance for 30 minutes, including:    Cybex Machines :   Cybex Leg Press Bilateral 3 x 10 with 6 plates   Cybex Leg Press Single (B) 3 x 10 each with 5  plates   Cybex Hamstring Curls 2 x 10 with 6.5 plates   Cybex Hip Abduction (B) 3 x 10 with 2 plates   Cybex Hip Flexion (B) 3 x 10 with 2 plates    Cybex Hip Extension (B) 3 x 10 with 2 plates    Sit to Stand from back mat x 20 holding 10# DB    Patient Education and Home Exercises       Education provided:   - home exercise program review, education on DAYRON precautions.     Written Home Exercises Provided: Pt instructed to continue prior HEP. Exercises were reviewed and Ant was able to demonstrate them prior to the end of the session.  Ant demonstrated good  understanding of the education provided. See Electronic Medical Record under Patient Instructions for exercises provided during therapy sessions    Assessment       Pt reports he is a little sore as his last therapy session was yesterday. He is ready to perform exercises today, but wants to go slow and take rest breaks. He was able to increase the number of reps on the Cybex Hip machine today. He is showing good improvement with Right Lower Extremity Strength and mobility. Gait quality is also improvement with stance time almost equal from Right to Left. He is going out of town this week and will miss therapy next week, but he plans to return on 1/6/2025. He does follow up with Dr Herson Mcintosh on 1/22/2025 and we will find out at that time if MD will want patient to continue with therapy or if he is good to Discharge. If MD orders additional therapy we will [facility] that [facility]. We are decreasing the frequency of Therapy from twice a week to once a week as we only have 4 [facility] approved visits remaining.  Sup Visit performed today with QAMAR Diaz, QAMAR Galindo, and QAMAR Powers.  All goals and treatment plan reviewed. Will work toward completion of all goals set.              PMH:  Ant is a 64 y.o. male referred to outpatient Physical Therapy with a medical diagnosis of Right TKA. Ant reports:  he had long standing pain in  the Right Hip. 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 Patient elected to have surgery and underwent Right DAYRON surgery on 9/30/2024. He did have home health Physical Therapy until 11/12/2024. He had follow up appointment with Orthopedic Surgeon on 11/13/2024. MD wants patient to work on increasing Hip Strength and normalizing gait. MD ordered Outpatient Physical Therapy and patient is here today for the Evaluation.   PMH : He has history of Left Hip Labrum Repair in 2020.   Patient presents with decreased Right Hip Strength and Range of Motion as expected following DAYRON. He has decreased Range of Motion in all directions and remains under the DAYRON precautions. Strength is reduced especially with Hip Abduction. Patient has difficulty with transfers. Patient transfers weight to Left Lower Extremity with sit to stand. He especially has difficulty standing from low surfaces. During ambulation, patient is noted to have decreased stance time and antalgic gait on the Right. Patient has decreased heel strike and hits foot flat with initial contact on the Right. Patient has decreased step/stride length resulting in slow brook. He is unable to perform stair negotiation at this time. Patient has poor balance at this time and is unable to perform single leg stance.   Patient has history of Left Hip Labrum Repair. Now due to the recent Right Hip surgery he is having new onset Left IT Band pain and popping around the knee.  Therapist will address both Hips during rehabilitation.   Therapist initiated the basic Home Exercise Program for Hip which includes : sit to stand, standing marches, standing hip abduction,  Bridges, Clams, Supine SLR, and Supine Hip Abduction.   Patient will benefit from skilled Physical Therapy intervention to address all deficits and help patient to return to their prior level of function.      Ant Is progressing well towards his goals.   Patient prognosis is Good.     Patient will continue to  benefit from skilled outpatient physical therapy to address the deficits listed in the problem list box on initial evaluation, provide pt/family education and to maximize pt's level of independence in the home and community environment.     Patient's spiritual, cultural and educational needs considered and pt agreeable to plan of care and goals.     Anticipated barriers to physical therapy: none    Goals:  Short Term Goals: 6 weeks   1. Independent with Home Exercise Program - Goal Met   2. Increase Right Hip Range of Motion to Within Normal Limits for a DAYRON - Goal Met   3. Increase Right Hip Strength to grossly 4/5 - Goal Met   4. Patient will ambulate 500 feet with stance time approximately equal from Right to Left and with pain reports at Less than or equal to 4/10 - Goal Met  5. Patient will be able to perform stair negotiation with step-to gait pattern - Goal Not Met      Long Term Goals: 12 weeks   1. Patient will increase Right Hip Strength to grossly 4+/5 - Goal Not Met   2. Patient will ambulate 1000+ feet with good step/stride length and with no complaints of pain or weakness in Right Hip - Goal Not Met     Plan     Plan of care Certification: 11/14/2024 to 2/7/2025.     Outpatient Physical Therapy 2 times weekly for 12 weeks to include the following interventions: 13278 [therapeutic exercise], 01030 [neuromuscular re-education], 91018 [gait training], 67045 [manual therapy], 19997 [therapeutic activities], and 27504 [unattended electrical stimulation]    SUE WAGGONER, PT, DPT  12/24/2024

## 2025-01-06 ENCOUNTER — CLINICAL SUPPORT (OUTPATIENT)
Dept: REHABILITATION | Facility: HOSPITAL | Age: 65
End: 2025-01-06
Payer: OTHER GOVERNMENT

## 2025-01-06 DIAGNOSIS — Z96.641 STATUS POST TOTAL HIP REPLACEMENT, RIGHT: Primary | ICD-10-CM

## 2025-01-06 DIAGNOSIS — R29.898 WEAKNESS OF RIGHT LEG: ICD-10-CM

## 2025-01-06 PROCEDURE — 97530 THERAPEUTIC ACTIVITIES: CPT | Mod: CQ

## 2025-01-06 PROCEDURE — 97110 THERAPEUTIC EXERCISES: CPT | Mod: CQ

## 2025-01-06 NOTE — PROGRESS NOTES
[facility]   Physical Therapy Treatment Note      Name: Ant Webber  [facility] Number: [id number]    Therapy Diagnosis:   Encounter Diagnoses   Name Primary?    Status post total hip replacement, right Yes    Weakness of right leg      Physician: Herson Mcintosh MD    Visit Date: 1/6/2025     Physician Orders: PT Eval and Treat   Medical Diagnosis from Referral: Right Hip DAYRON   Evaluation Date: 11/14/2024  Authorization Period Expiration: 3/29/2025  Plan of Care Expiration: 2/7/2025    Visit # / Visits authorized: 11/ 15   PTA Visit #: 1/5   FOTO: 33/100     Precautions: DAYRON Precautions       Time In: 11:30 am  Time Out: 12:09 pm  Total Appointment Time (timed & untimed codes): 39 billable minutes     Subjective     Patient reports: a little stiffness in his hip and other joints due to the cold weather. Feeling pretty good overall.   He was compliant with home exercise program.  Response to previous treatment: soreness in the hip  Functional change: n/a    Pain: 1/10  Location: right hip      Objective      Objective Measures updated at progress report unless specified.     Treatment     Ant received the treatments listed below:      therapeutic exercises to develop strength, endurance, ROM, and flexibility for 15 minutes including:    NuStep: 5 minutes   SB 4 x 15 second hold   Hamstring Stretch on Stairs 4 x 15 second hold  Hip Flexor Stretch @ Stairs 4x15 sec hold    Iliotibial band stretch on LLE in supine with green strap 4 x 15 seconds  (not performed today)     Forward Step Ups on stairs 3 x 10 (B) 6inch step     Seated :  Marching       manual therapy techniques: - were applied to the: - for - minutes, including:    neuromuscular re-education activities to improve: Balance, Coordination, Kinesthetic, Sense, and Proprioception for - minutes. The following activities were included:    therapeutic activities to improve functional performance for 24 minutes, including:    Cybex Machines  :   Cybex Leg Press Bilateral 2 x 10 with 7 plates   Cybex Leg Press Single (B) 3 x 10 each with 5 plates   Cybex Hamstring Curls 2 x 10 with 7 plates   Cybex Hip Abduction (B) 2 x 10 with 2 plates   Cybex Hip Flexion (B) 2 x 10 with 2 plates    Cybex Hip Extension (B) 2 x 10 with 2 plates    Sit to Stand from back mat x 20 holding 10# DB    Patient Education and Home Exercises       Education provided:   - home exercise program review, education on DAYRON precautions.     Written Home Exercises Provided: Pt instructed to continue prior HEP. Exercises were reviewed and Ant was able to demonstrate them prior to the end of the session.  Ant demonstrated good  understanding of the education provided. See Electronic Medical Record under Patient Instructions for exercises provided during therapy sessions    Assessment       Pt reports he has been gone with his family on a trip over the holidays and is feeling pretty good other than some stiffness in the hip from the cooler weather. He was able to tolerate all of the lower extremity exercises well today with increased weight on the cybex bilateral leg press and cybex hamstring curl machines. He is showing good improvement with Right Lower Extremity Strength and mobility. Gait quality is also improvement with stance time almost equal from Right to Left. He does follow up with Dr Herson Mcintosh on 1/22/2025 and we will find out at that time if MD will want patient to continue with therapy or if he is good to Discharge. If MD orders additional therapy we will [facility]. We are decreasing the frequency of Therapy from twice a week to once a week as we only have 4 [facility] approved visits remaining.        PMH:  Ant is a 64 y.o. male referred to outpatient Physical Therapy with a medical diagnosis of Right TKA. Ant reports:  he had long standing pain in the Right Hip. 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 Patient elected to have surgery and  underwent Right DAYRON surgery on 9/30/2024. He did have home health Physical Therapy until 11/12/2024. He had follow up appointment with Orthopedic Surgeon on 11/13/2024. MD wants patient to work on increasing Hip Strength and normalizing gait. MD ordered Outpatient Physical Therapy and patient is here today for the Evaluation.   PMH : He has history of Left Hip Labrum Repair in 2020.   Patient presents with decreased Right Hip Strength and Range of Motion as expected following DAYRON. He has decreased Range of Motion in all directions and remains under the DAYRON precautions. Strength is reduced especially with Hip Abduction. Patient has difficulty with transfers. Patient transfers weight to Left Lower Extremity with sit to stand. He especially has difficulty standing from low surfaces. During ambulation, patient is noted to have decreased stance time and antalgic gait on the Right. Patient has decreased heel strike and hits foot flat with initial contact on the Right. Patient has decreased step/stride length resulting in slow brook. He is unable to perform stair negotiation at this time. Patient has poor balance at this time and is unable to perform single leg stance.   Patient has history of Left Hip Labrum Repair. Now due to the recent Right Hip surgery he is having new onset Left IT Band pain and popping around the knee.  Therapist will address both Hips during rehabilitation.   Therapist initiated the basic Home Exercise Program for Hip which includes : sit to stand, standing marches, standing hip abduction,  Bridges, Clams, Supine SLR, and Supine Hip Abduction.   Patient will benefit from skilled Physical Therapy intervention to address all deficits and help patient to return to their prior level of function.      Ant Is progressing well towards his goals.   Patient prognosis is Good.     Patient will continue to benefit from skilled outpatient physical therapy to address the deficits listed in the problem list  box on initial evaluation, provide pt/family education and to maximize pt's level of independence in the home and community environment.     Patient's spiritual, cultural and educational needs considered and pt agreeable to plan of care and goals.     Anticipated barriers to physical therapy: none    Goals:  Short Term Goals: 6 weeks   1. Independent with Home Exercise Program - Goal Met   2. Increase Right Hip Range of Motion to Within Normal Limits for a DAYRON - Goal Met   3. Increase Right Hip Strength to grossly 4/5 - Goal Met   4. Patient will ambulate 500 feet with stance time approximately equal from Right to Left and with pain reports at Less than or equal to 4/10 - Goal Met  5. Patient will be able to perform stair negotiation with step-to gait pattern - Goal Not Met      Long Term Goals: 12 weeks   1. Patient will increase Right Hip Strength to grossly 4+/5 - Goal Not Met   2. Patient will ambulate 1000+ feet with good step/stride length and with no complaints of pain or weakness in Right Hip - Goal Not Met     Plan     Plan of care Certification: 11/14/2024 to 2/7/2025.     Outpatient Physical Therapy 2 times weekly for 12 weeks to include the following interventions: 33379 [therapeutic exercise], 91931 [neuromuscular re-education], 82550 [gait training], 49618 [manual therapy], 22561 [therapeutic activities], and 99560 [unattended electrical stimulation]    Santo Flores PTA  1/6/2025

## 2025-01-13 ENCOUNTER — CLINICAL SUPPORT (OUTPATIENT)
Dept: REHABILITATION | Facility: HOSPITAL | Age: 65
End: 2025-01-13
Payer: OTHER GOVERNMENT

## 2025-01-13 DIAGNOSIS — Z96.641 STATUS POST TOTAL HIP REPLACEMENT, RIGHT: Primary | ICD-10-CM

## 2025-01-13 DIAGNOSIS — R29.898 WEAKNESS OF RIGHT LEG: ICD-10-CM

## 2025-01-13 PROCEDURE — 97530 THERAPEUTIC ACTIVITIES: CPT | Mod: CQ

## 2025-01-13 PROCEDURE — 97110 THERAPEUTIC EXERCISES: CPT | Mod: CQ

## 2025-01-13 NOTE — PROGRESS NOTES
[facility]   Physical Therapy Treatment Note      Name: Ant QUINTANA Akbar  [facility] Number: [id number]    Therapy Diagnosis:   Encounter Diagnoses   Name Primary?    Status post total hip replacement, right Yes    Weakness of right leg      Physician: Herson Mcintosh MD    Visit Date: 1/13/2025     Physician Orders: PT Eval and Treat   Medical Diagnosis from Referral: Right Hip DAYRON   Evaluation Date: 11/14/2024  Authorization Period Expiration: 3/29/2025  Plan of Care Expiration: 2/7/2025    Visit # / Visits authorized: 12/ 15   PTA Visit #: 2/5   FOTO: 33/100     Precautions: DAYRON Precautions       Time In: 11:30 am  Time Out: 12:10 pm  Total Appointment Time (timed & untimed codes): 40 billable minutes     Subjective     Patient reports: mild soreness in the hip. Mostly stiff after her sits for awhile and then gets up.   He was compliant with home exercise program.  Response to previous treatment: soreness in the hip  Functional change: n/a    Pain: 1/10  Location: right hip      Objective      Objective Measures updated at progress report unless specified.     Treatment     Ant received the treatments listed below:      therapeutic exercises to develop strength, endurance, ROM, and flexibility for 15 minutes including:    NuStep: 5 minutes   SB 4 x 15 second hold   Hamstring Stretch on Stairs 4 x 15 second hold  Hip Flexor Stretch @ Stairs 4x15 sec hold    Iliotibial band stretch on LLE in supine with green strap 4 x 15 seconds  (not performed today)     Forward Step Ups on stairs 3 x 10 (B) 6inch step     Seated :  Marching       manual therapy techniques: - were applied to the: - for - minutes, including:    neuromuscular re-education activities to improve: Balance, Coordination, Kinesthetic, Sense, and Proprioception for - minutes. The following activities were included:    therapeutic activities to improve functional performance for 23 minutes, including:    Cybex Machines :   Dishable Leg  Press Bilateral 2 x 10 with 7 plates   Cybex Leg Press Single (B) 3 x 10 each with 5 plates   Cybex Hamstring Curls 2 x 10 with 7 plates (not performed today)   Cybex Hip Abduction (B) 2 x 10 with 2 plates   Cybex Hip Flexion (B) 2 x 10 with 2 plates    Cybex Hip Extension (B) 2 x 10 with 2 plates    Sit to Stand from back mat x 20 holding 10# DB (not performed today)     Patient Education and Home Exercises       Education provided:   - home exercise program review, education on DAYRON precautions.     Written Home Exercises Provided: Pt instructed to continue prior HEP. Exercises were reviewed and Ant was able to demonstrate them prior to the end of the session.  Ant demonstrated good  understanding of the education provided. See Electronic Medical Record under Patient Instructions for exercises provided during therapy sessions    Assessment       Pt reports mild soreness in the hip today. Mostly stiff after her sits for awhile and then gets up.  He is showing good improvement with Right Lower Extremity Strength and mobility. Gait quality is also improvement with stance time almost equal from Right to Left. He does follow up with Dr Herson Mcintosh on 1/22/2025 and we will find out at that time if MD will want patient to continue with therapy or if he is good to Discharge. If MD orders additional therapy we will [facility] at that time. We are decreasing the frequency of Therapy from twice a week to once a week as we only have 4 [facility] approved visits remaining.        PMH:  Ant is a 64 y.o. male referred to outpatient Physical Therapy with a medical diagnosis of Right TKA. Ant reports:  he had long standing pain in the Right Hip. 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 Patient elected to have surgery and underwent Right DAYRON surgery on 9/30/2024. He did have home health Physical Therapy until 11/12/2024. He had follow up appointment with Orthopedic Surgeon on 11/13/2024. MD wants patient to work  on increasing Hip Strength and normalizing gait. MD ordered Outpatient Physical Therapy and patient is here today for the Evaluation.   PMH : He has history of Left Hip Labrum Repair in 2020.   Patient presents with decreased Right Hip Strength and Range of Motion as expected following DAYRON. He has decreased Range of Motion in all directions and remains under the DAYRON precautions. Strength is reduced especially with Hip Abduction. Patient has difficulty with transfers. Patient transfers weight to Left Lower Extremity with sit to stand. He especially has difficulty standing from low surfaces. During ambulation, patient is noted to have decreased stance time and antalgic gait on the Right. Patient has decreased heel strike and hits foot flat with initial contact on the Right. Patient has decreased step/stride length resulting in slow brook. He is unable to perform stair negotiation at this time. Patient has poor balance at this time and is unable to perform single leg stance.   Patient has history of Left Hip Labrum Repair. Now due to the recent Right Hip surgery he is having new onset Left IT Band pain and popping around the knee.  Therapist will address both Hips during rehabilitation.   Therapist initiated the basic Home Exercise Program for Hip which includes : sit to stand, standing marches, standing hip abduction,  Bridges, Clams, Supine SLR, and Supine Hip Abduction.   Patient will benefit from skilled Physical Therapy intervention to address all deficits and help patient to return to their prior level of function.      Ant Is progressing well towards his goals.   Patient prognosis is Good.     Patient will continue to benefit from skilled outpatient physical therapy to address the deficits listed in the problem list box on initial evaluation, provide pt/family education and to maximize pt's level of independence in the home and community environment.     Patient's spiritual, cultural and educational needs  considered and pt agreeable to plan of care and goals.     Anticipated barriers to physical therapy: none    Goals:  Short Term Goals: 6 weeks   1. Independent with Home Exercise Program - Goal Met   2. Increase Right Hip Range of Motion to Within Normal Limits for a DAYRON - Goal Met   3. Increase Right Hip Strength to grossly 4/5 - Goal Met   4. Patient will ambulate 500 feet with stance time approximately equal from Right to Left and with pain reports at Less than or equal to 4/10 - Goal Met  5. Patient will be able to perform stair negotiation with step-to gait pattern - Goal Not Met      Long Term Goals: 12 weeks   1. Patient will increase Right Hip Strength to grossly 4+/5 - Goal Not Met   2. Patient will ambulate 1000+ feet with good step/stride length and with no complaints of pain or weakness in Right Hip - Goal Not Met     Plan     Plan of care Certification: 11/14/2024 to 2/7/2025.     Outpatient Physical Therapy 2 times weekly for 12 weeks to include the following interventions: 18340 [therapeutic exercise], 84655 [neuromuscular re-education], 43313 [gait training], 94635 [manual therapy], 02976 [therapeutic activities], and 93647 [unattended electrical stimulation]    Santo Flores PTA  1/13/2025

## 2025-01-28 ENCOUNTER — CLINICAL SUPPORT (OUTPATIENT)
Dept: REHABILITATION | Facility: HOSPITAL | Age: 65
End: 2025-01-28
Payer: OTHER GOVERNMENT

## 2025-01-28 DIAGNOSIS — Z96.641 STATUS POST TOTAL HIP REPLACEMENT, RIGHT: Primary | ICD-10-CM

## 2025-01-28 DIAGNOSIS — R29.898 WEAKNESS OF RIGHT LEG: ICD-10-CM

## 2025-01-28 PROCEDURE — 97110 THERAPEUTIC EXERCISES: CPT

## 2025-01-28 NOTE — PROGRESS NOTES
[facility]   Physical Therapy Treatment Note/Discharge Summary      Name: Ant Webber  Clinic Number: [id number]    Therapy Diagnosis:   Encounter Diagnoses   Name Primary?    Status post total hip replacement, right Yes    Weakness of right leg      Physician: Herson Mcintosh MD    Visit Date: 1/28/2025     Physician Orders: PT Eval and Treat   Medical Diagnosis from Referral: Right Hip DAYRON   Evaluation Date: 11/14/2024  Authorization Period Expiration: 3/29/2025  Plan of Care Expiration: 2/7/2025    Visit # / Visits authorized: 14 /15   PTA Visit #: 0/5   FOTO Initial : 33/100  FOTO Discharge : 69/100     Precautions: DAYRON Precautions       Time In: 11:40 am  Time Out: 11:55 am  Total Appointment Time (timed & untimed codes): 15 billable minutes     Subjective     Patient reports: MD wants him to stop Therapy at this time.   He was compliant with home exercise program.  Response to previous treatment: soreness in the hip  Functional change: n/a    Pain: 4/10  Location: right hip      Objective      Objective Measures updated at progress report unless specified.     Treatment     Ant received the treatments listed below:      therapeutic exercises to develop strength, endurance, ROM, and flexibility for 15 minutes including:    Reviewed his Home Exercise Program only today. No other Exercises were performed     NuStep: 5 minutes   SB 4 x 15 second hold   Hamstring Stretch on Stairs 4 x 15 second hold  Hip Flexor Stretch @ Stairs 4x15 sec hold    Iliotibial band stretch on LLE in supine with green strap 4 x 15 seconds  (not performed today)     Forward Step Ups on stairs 3 x 10 (B) 6inch step     Seated :  Marching       manual therapy techniques: - were applied to the: - for - minutes, including:    neuromuscular re-education activities to improve: Balance, Coordination, Kinesthetic, Sense, and Proprioception for - minutes. The following activities were included:    therapeutic activities  to improve functional performance for 0 minutes, including:    Cybex Machines :   Cybex Leg Press Bilateral 2 x 10 with 7 plates   Cybex Leg Press Single (B) 3 x 10 each with 5 plates   Cybex Hamstring Curls 2 x 10 with 7 plates (not performed today)   Cybex Hip Abduction (B) 2 x 10 with 2 plates   Cybex Hip Flexion (B) 2 x 10 with 2 plates    Cybex Hip Extension (B) 2 x 10 with 2 plates    Sit to Stand from back mat x 20 holding 10# DB (not performed today)     Patient Education and Home Exercises       Education provided:   - home exercise program review, education on DAYRON precautions.     Written Home Exercises Provided: Pt instructed to continue prior HEP. Exercises were reviewed and Ant was able to demonstrate them prior to the end of the session.  Ant demonstrated good  understanding of the education provided. See Electronic Medical Record under Patient Instructions for exercises provided during therapy sessions    Assessment       Patient received Physical Therapy for 9 weeks and 14 visits following his Right DAYRON. He did very well with Physical Therapy and had met all goals set. Unfortunately, last week he started having increased anterior hip pain. He followed up with Dr Herson Mcintosh last week and MD assessed the hip. MD feels like patient just strained the hip flexor. He placed patient on anti-inflammatories and is having him stop the therapy at this time. Therapist spoke with patient at length regarding this today. Feel like patient was ready for Discharge anyway and this should just be a minor set back. Therapist reviewed his Home Exercise Program and instructed him that he may begin to do these once his hip pain returns to normal. He reports he has already been doing a few of these at home anyway and it helps his hip feel better. Patient will therefore be discharged from Physical Therapy services at this time per MD orders. Prior to this muscle strain he had met all goals set.   FOTO Initial : 33/100  FOTO  Discharge : 69/100        PMH:  Ant is a 64 y.o. male referred to outpatient Physical Therapy with a medical diagnosis of Right DAYRON. Ant reports:  he had long standing pain in the Right Hip. 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 Patient elected to have surgery and underwent Right DAYRON surgery on 9/30/2024. He did have home health Physical Therapy until 11/12/2024. He had follow up appointment with Orthopedic Surgeon on 11/13/2024. MD wants patient to work on increasing Hip Strength and normalizing gait. MD ordered Outpatient Physical Therapy and patient is here today for the Evaluation.   PMH : He has history of Left Hip Labrum Repair in 2020.   Patient presents with decreased Right Hip Strength and Range of Motion as expected following DAYRON. He has decreased Range of Motion in all directions and remains under the DAYRON precautions. Strength is reduced especially with Hip Abduction. Patient has difficulty with transfers. Patient transfers weight to Left Lower Extremity with sit to stand. He especially has difficulty standing from low surfaces. During ambulation, patient is noted to have decreased stance time and antalgic gait on the Right. Patient has decreased heel strike and hits foot flat with initial contact on the Right. Patient has decreased step/stride length resulting in slow brook. He is unable to perform stair negotiation at this time. Patient has poor balance at this time and is unable to perform single leg stance.   Patient has history of Left Hip Labrum Repair. Now due to the recent Right Hip surgery he is having new onset Left IT Band pain and popping around the knee.  Therapist will address both Hips during rehabilitation.   Therapist initiated the basic Home Exercise Program for Hip which includes : sit to stand, standing marches, standing hip abduction,  Bridges, Clams, Supine SLR, and Supine Hip Abduction.   Patient will benefit from skilled Physical Therapy intervention to address all  deficits and help patient to return to their prior level of function.        Goals:  Short Term Goals: 6 weeks   1. Independent with Home Exercise Program - Goal Met   2. Increase Right Hip Range of Motion to Within Normal Limits for a DAYRON - Goal Met   3. Increase Right Hip Strength to grossly 4/5 - Goal Met   4. Patient will ambulate 500 feet with stance time approximately equal from Right to Left and with pain reports at Less than or equal to 4/10 - Goal Met  5. Patient will be able to perform stair negotiation with step-to gait pattern - Goal Met      Long Term Goals: 12 weeks   1. Patient will increase Right Hip Strength to grossly 4+/5 - Goal Met   2. Patient will ambulate 1000+ feet with good step/stride length and with no complaints of pain or weakness in Right Hip - Goal Met     Discharge reason: MD orders     Plan   This patient is discharged from Physical Therapy.    Date of Last visit: 1/28/2025  Total Visits Received: 14  Cancelled Visits: 0  No Show Visits: 0    SUE H ROSALINE, PT, DPT

## 2025-02-04 RX ORDER — PREGABALIN 100 MG/1
100 CAPSULE ORAL 3 TIMES DAILY
Qty: 90 CAPSULE | Refills: 5 | Status: CANCELLED | OUTPATIENT
Start: 2025-02-04

## 2025-02-04 RX ORDER — TIZANIDINE HYDROCHLORIDE 4 MG/1
4 CAPSULE, GELATIN COATED ORAL EVERY 8 HOURS PRN
Qty: 90 CAPSULE | Refills: 5 | Status: CANCELLED | OUTPATIENT
Start: 2025-02-04

## 2025-02-04 NOTE — PROGRESS NOTES
Subjective:         Patient ID: Ant Webber is a 64 y.o. male.    Chief Complaint: Pain, Back Pain, Leg Pain (Left ), and Foot Pain (left)        Pain  This is a chronic problem. The current episode started more than 1 year ago. The problem occurs daily. The problem has been unchanged. Associated symptoms include arthralgias. Pertinent negatives include no anorexia, chest pain, chills, coughing, diaphoresis, fever, sore throat, vertigo or vomiting.     Review of Systems   Constitutional:  Negative for activity change, appetite change, chills, diaphoresis, fever and unexpected weight change.   HENT:  Negative for drooling, ear discharge, ear pain, facial swelling, mouth dryness, nosebleeds, sore throat, trouble swallowing, voice change and goiter.    Eyes:  Negative for photophobia, pain, discharge, redness and visual disturbance.   Respiratory:  Negative for apnea, cough, choking, chest tightness, shortness of breath, wheezing and stridor.    Cardiovascular:  Negative for chest pain, palpitations and leg swelling.   Gastrointestinal:  Negative for abdominal distention, anorexia, diarrhea, rectal pain, vomiting and fecal incontinence.   Endocrine: Negative for cold intolerance, heat intolerance, polydipsia, polyphagia and polyuria.   Genitourinary:  Negative for bladder incontinence, dysuria, flank pain and frequency.   Musculoskeletal:  Positive for arthralgias, back pain and leg pain.   Integumentary:  Negative for color change and pallor.   Neurological:  Negative for dizziness, vertigo, seizures, syncope, facial asymmetry, speech difficulty, light-headedness, memory loss and coordination difficulties.   Hematological:  Negative for adenopathy. Does not bruise/bleed easily.   Psychiatric/Behavioral:  Negative for agitation, behavioral problems, confusion, decreased concentration, dysphoric mood, hallucinations and suicidal ideas. The patient is not nervous/anxious and is not hyperactive.            Past  Medical History:   Diagnosis Date    Diabetes     Diarrhea 2023    Hypertension     Lumbar post-laminectomy syndrome     Lumbosacral radiculopathy     Sleep apnea     Transient ischemic attack (TIA)      Past Surgical History:   Procedure Laterality Date    BACK SURGERY  2013    Bilateral L4-L5 TFESI Bilateral 10-, 2017, 2017    Dr Simms    Bilateral L5-S1 TFESI Bilateral 05/15/2019    Dr Simms    CHOLECYSTOTOMY      EPIDURAL STEROID INJECTION N/A 2024    Procedure: Injection, Steroid, Epidural, L4/5;  Surgeon: Nani Simms MD;  Location: [facility];  Service: Pain Management;  Laterality: N/A;    HAND SURGERY      HIP ARTHROSCOPY W/ LABRAL DEBRIDEMENT      L5-S1 SUSAN  2019    Dr Simms    Left L4-L5 TFESI Left 8-, 2016, 2016    Dr Simms    ROBOTIC ARTHROPLASTY, HIP Right 2024    Procedure: ROBOTIC ARTHROPLASTY,HIP;  Surgeon: Herson Mcintosh MD;  Location: [facility];  Service: Orthopedics;  Laterality: Right;    SELECTIVE INJECTION OF ANESTHETIC AGENT AROUND LUMBAR SPINAL NERVE ROOT BY TRANSFORAMINAL APPROACH Left 2021    Procedure: Left L5-S1 TFESI;  Surgeon: Nani Simms MD;  Location: [facility];  Service: Pain Management;  Laterality: Left;  NO VAC   WILL BE TESTED    RECENTLY HAD COVID    TONSILLECTOMY      UMBILICAL HERNIA REPAIR       Social History     Socioeconomic History    Marital status:    Occupational History    Occupation: retail sales   Tobacco Use    Smoking status: Former     Current packs/day: 0.00     Types: Cigarettes     Quit date:      Years since quittin.1    Smokeless tobacco: Former     Types: Snuff     Quit date:    Substance and Sexual Activity    Alcohol use: Yes     Comment: occasionally    Drug use: Never     Social Drivers of Health     Financial Resource Strain: Low Risk  (2024)    Overall Financial Resource Strain (CARDIA)     Difficulty of Paying  "Living Expenses: Not very hard   Food Insecurity: No Food Insecurity (9/12/2024)    Hunger Vital Sign     Worried About Running Out of Food in the Last Year: Never true     Ran Out of Food in the Last Year: Never true   Physical Activity: Unknown (9/12/2024)    Exercise Vital Sign     Days of Exercise per Week: 0 days   Stress: No Stress Concern Present (9/12/2024)    Citizen of [place] [employer] - Occupational Stress Questionnaire     Feeling of Stress : Not at all   Housing Stability: Unknown (9/12/2024)    Housing Stability Vital Sign     Unable to Pay for Housing in the Last Year: No     Family History   Problem Relation Name Age of Onset    Parkinsonism Mother      Hypertension Mother      Diabetes Father      Parkinsonism Father       Review of patient's allergies indicates:  No Known Allergies     Objective:  Vitals:    02/12/25 1122 02/12/25 1124   BP: (!) 144/84    Pulse: 79    Resp: 18    Weight: (!) 139.7 kg (308 lb)    Height: 5' 10" (1.778 m)    PainSc:   4   4                 Physical Exam  Vitals and nursing note reviewed. Exam conducted with a chaperone present.   Constitutional:       General: He is awake. He is not in acute distress.     Appearance: Normal appearance. He is not ill-appearing or diaphoretic.   HENT:      Head: Normocephalic and atraumatic.      Nose: Nose normal.      Mouth/Throat:      Mouth: Mucous membranes are moist.      Pharynx: Oropharynx is clear.   Eyes:      Conjunctiva/sclera: Conjunctivae normal.      Pupils: Pupils are equal, round, and reactive to light.   Cardiovascular:      Rate and Rhythm: Normal rate.   Pulmonary:      Effort: Pulmonary effort is normal. No respiratory distress.   Abdominal:      Palpations: Abdomen is soft.      Tenderness: There is no guarding.   Musculoskeletal:         General: Normal range of motion.      Cervical back: Normal range of motion and neck supple. No rigidity.      Thoracic back: Tenderness present.      Lumbar back: " Tenderness present.   Skin:     General: Skin is warm and dry.      Coloration: Skin is not jaundiced or pale.   Neurological:      General: No focal deficit present.      Mental Status: He is alert and oriented to person, place, and time. Mental status is at baseline.      Cranial Nerves: No cranial nerve deficit (II-XII).   Psychiatric:         Mood and Affect: Mood normal.         Behavior: Behavior normal. Behavior is cooperative.         Thought Content: Thought content normal.           X-Ray Hip 2 or 3 views Right with Pelvis when performed  See Procedure Notes for results.     IMPRESSION: Please see Ortho procedure notes for report.      This procedure was auto-finalized by: Virtual Radiologist         Office Visit on 12/05/2024   Component Date Value Ref Range Status    POC Molecular Influenza A Ag 12/05/2024 Positive (A)  Negative Final    POC Molecular Influenza B Ag 12/05/2024 Negative  Negative Final     Acceptable 12/05/2024 Yes   Final    POC Rapid COVID 12/05/2024 Negative  Negative Final     Acceptable 12/05/2024 Yes   Final   Admission on 09/30/2024, Discharged on 10/01/2024   Component Date Value Ref Range Status    POC Glucose 09/30/2024 143 (H)  70 - 105 mg/dL Final    Case Report 09/30/2024    Final                    Value:Surgical Pathology                                Case: [id number]                                   Authorizing Provider:  Herson Mcintosh MD           Collected:           09/30/2024 10:38 AM          Ordering Location:     [facility] -         Received:            09/30/2024 11:18 AM                                 [facility]                                                   Pathologist:           Destin Garcia MD                                                           Specimen:    Femoral Head, Right                                                                        Final Diagnosis 09/30/2024    Final                     Value:A. Right femoral head:  Articular bone with degenerative joint disease      Gross Description 09/30/2024    Final                    Value:A. Femoral Head, Right:   The specimen is received in formalin as femoral head, right and consists of a femoral head measuring 5 x 5 x 4.5 cm.  There is a segment of femoral neck attached measuring up to 1.5 cm in length.  The surface of the femoral head is tan/white and smooth.  The base of the femoral head is yellow/tan and smooth.  The cut surface reveals an articular layer measuring from 0.2 cm to less than 0.1 cm.  In the areas where the cartilage is thinnest, there are underlying hemorrhagic/tan patches measuring up to 0.2 cm.  Representative sections are submitted in block A1 following decalcification.    Grossing was completed by Gonzalo Edouard.      Microscopic Description 09/30/2024    Final                    Value:A microscopic examination was performed and the diagnosis reflects the findings.          Laboratory Notes 09/30/2024    Final                    Value:If this report includes immunohistochemical (IHC) test results, please note the following: IHC studies were interpreted in conjunction with appropriate positive and negative controls which demonstrate the expected positive and negative reactivity. This laboratory is regulated under CLIA as qualified to perform high-complexity testing. IHC tests are used for clinical purposes. They should not be regarded as investigational or research.        POC Glucose 09/30/2024 175 (H)  70 - 105 mg/dL Final    POC Glucose 09/30/2024 218 (H)  70 - 105 mg/dL Final    Sodium 10/01/2024 136  136 - 145 mmol/L Final    Potassium 10/01/2024 4.9  3.5 - 5.1 mmol/L Final    Chloride 10/01/2024 105  98 - 107 mmol/L Final    CO2 10/01/2024 28  21 - 32 mmol/L Final    Anion Gap 10/01/2024 8  7 - 16 mmol/L Final    Glucose 10/01/2024 166 (H)  74 - 106 mg/dL Final    BUN 10/01/2024 19 (H)  7 - 18 mg/dL Final    Creatinine  10/01/2024 1.06  0.70 - 1.30 mg/dL Final    BUN/Creatinine Ratio 10/01/2024 18  6 - 20 Final    Calcium 10/01/2024 8.7  8.5 - 10.1 mg/dL Final    eGFR 10/01/2024 79  >=60 mL/min/1.73m2 Final    WBC 10/01/2024 18.77 (H)  4.50 - 11.00 K/uL Final    RBC 10/01/2024 4.75  4.60 - 6.20 M/uL Final    Hemoglobin 10/01/2024 12.9 (L)  13.5 - 18.0 g/dL Final    Hematocrit 10/01/2024 41.5  40.0 - 54.0 % Final    MCV 10/01/2024 87.4  80.0 - 96.0 fL Final    MCH 10/01/2024 27.2  27.0 - 31.0 pg Final    MCHC 10/01/2024 31.1 (L)  32.0 - 36.0 g/dL Final    RDW 10/01/2024 15.4 (H)  11.5 - 14.5 % Final    Platelet Count 10/01/2024 288  150 - 400 K/uL Final    MPV 10/01/2024 9.6  9.4 - 12.4 fL Final    Neutrophils % 10/01/2024 90.4 (H)  53.0 - 65.0 % Final    Lymphocytes % 10/01/2024 2.8 (L)  27.0 - 41.0 % Final    Monocytes % 10/01/2024 5.9  2.0 - 6.0 % Final    Eosinophils % 10/01/2024 0.0 (L)  1.0 - 4.0 % Final    Basophils % 10/01/2024 0.2  0.0 - 1.0 % Final    Immature Granulocytes % 10/01/2024 0.7 (H)  0.0 - 0.4 % Final    nRBC, Auto 10/01/2024 0.0  <=0.0 % Final    Neutrophils, Abs 10/01/2024 16.98 (H)  1.80 - 7.70 K/uL Final    Lymphocytes, Absolute 10/01/2024 0.52 (L)  1.00 - 4.80 K/uL Final    Monocytes, Absolute 10/01/2024 1.11 (H)  0.00 - 0.80 K/uL Final    Eosinophils, Absolute 10/01/2024 0.00  0.00 - 0.50 K/uL Final    Basophils, Absolute 10/01/2024 0.03  0.00 - 0.20 K/uL Final    Immature Granulocytes, Absolute 10/01/2024 0.13 (H)  0.00 - 0.04 K/uL Final    nRBC, Absolute 10/01/2024 0.00  <=0.00 x10e3/uL Final    Diff Type 10/01/2024 Auto   Final   Clinical Support on 09/18/2024   Component Date Value Ref Range Status    QRS Duration 09/18/2024 76  ms Final    [facility] QTC Calculation 09/18/2024 410  ms Final   Lab Visit on 09/18/2024   Component Date Value Ref Range Status    PTT 09/18/2024 27.5  25.2 - 37.3 seconds Final    Sodium 09/18/2024 140  136 - 145 mmol/L Final    Potassium 09/18/2024 4.1  3.5 - 5.1 mmol/L Final     Chloride 09/18/2024 105  98 - 107 mmol/L Final    CO2 09/18/2024 31  21 - 32 mmol/L Final    Anion Gap 09/18/2024 8  7 - 16 mmol/L Final    Glucose 09/18/2024 174 (H)  74 - 106 mg/dL Final    BUN 09/18/2024 17  7 - 18 mg/dL Final    Creatinine 09/18/2024 1.10  0.70 - 1.30 mg/dL Final    BUN/Creatinine Ratio 09/18/2024 15  6 - 20 Final    Calcium 09/18/2024 9.8  8.5 - 10.1 mg/dL Final    Total Protein 09/18/2024 7.5  6.4 - 8.2 g/dL Final    Albumin 09/18/2024 3.3 (L)  3.5 - 5.0 g/dL Final    Globulin 09/18/2024 4.2 (H)  2.0 - 4.0 g/dL Final    A/G Ratio 09/18/2024 0.8   Final    Bilirubin, Total 09/18/2024 0.5  >0.0 - 1.2 mg/dL Final    Alk Phos 09/18/2024 82  45 - 115 U/L Final    ALT 09/18/2024 42  16 - 61 U/L Final    AST 09/18/2024 27  15 - 37 U/L Final    eGFR 09/18/2024 75  >=60 mL/min/1.73m2 Final    PT 09/18/2024 14.0  11.7 - 14.7 seconds Final    INR 09/18/2024 1.09  <=3.30 Final    Specimen Outdate 09/18/2024 10/01/2024 23:59   Corrected    Group & Rh 09/18/2024 A POS   Final    Indirect Winsome 09/18/2024 NEG   Final    Color, UA 09/18/2024 Light-Yellow  Colorless, Straw, Yellow, Light Yellow, Dark Yellow Final    Clarity, UA 09/18/2024 Clear  Clear Final    pH, UA 09/18/2024 5.5  5.0 to 8.0 pH Units Final    Leukocytes, UA 09/18/2024 Negative  Negative Final    Nitrites, UA 09/18/2024 Negative  Negative Final    Protein, UA 09/18/2024 Negative  Negative Final    Glucose, UA 09/18/2024 >1000 (A)  Normal mg/dL Final    Ketones, UA 09/18/2024 Negative  Negative mg/dL Final    Urobilinogen, UA 09/18/2024 Normal  0.2, 1.0, Normal mg/dL Final    Bilirubin, UA 09/18/2024 Negative  Negative Final    Blood, UA 09/18/2024 Negative  Negative Final    Specific Gravity, UA 09/18/2024 1.031 (H)  <=1.030 Final    WBC 09/18/2024 9.99  4.50 - 11.00 K/uL Final    RBC 09/18/2024 5.76  4.60 - 6.20 M/uL Final    Hemoglobin 09/18/2024 15.6  13.5 - 18.0 g/dL Final    Hematocrit 09/18/2024 50.6  40.0 - 54.0 % Final    MCV  09/18/2024 87.8  80.0 - 96.0 fL Final    MCH 09/18/2024 27.1  27.0 - 31.0 pg Final    MCHC 09/18/2024 30.8 (L)  32.0 - 36.0 g/dL Final    RDW 09/18/2024 16.6 (H)  11.5 - 14.5 % Final    Platelet Count 09/18/2024 331  150 - 400 K/uL Final    MPV 09/18/2024 9.6  9.4 - 12.4 fL Final    Neutrophils % 09/18/2024 83.5 (H)  53.0 - 65.0 % Final    Lymphocytes % 09/18/2024 5.3 (L)  27.0 - 41.0 % Final    Monocytes % 09/18/2024 6.7 (H)  2.0 - 6.0 % Final    Eosinophils % 09/18/2024 3.0  1.0 - 4.0 % Final    Basophils % 09/18/2024 0.8  0.0 - 1.0 % Final    Immature Granulocytes % 09/18/2024 0.7 (H)  0.0 - 0.4 % Final    nRBC, Auto 09/18/2024 0.0  <=0.0 % Final    Neutrophils, Abs 09/18/2024 8.34 (H)  1.80 - 7.70 K/uL Final    Lymphocytes, Absolute 09/18/2024 0.53 (L)  1.00 - 4.80 K/uL Final    Monocytes, Absolute 09/18/2024 0.67  0.00 - 0.80 K/uL Final    Eosinophils, Absolute 09/18/2024 0.30  0.00 - 0.50 K/uL Final    Basophils, Absolute 09/18/2024 0.08  0.00 - 0.20 K/uL Final    Immature Granulocytes, Absolute 09/18/2024 0.07 (H)  0.00 - 0.04 K/uL Final    nRBC, Absolute 09/18/2024 0.00  <=0.00 x10e3/uL Final    Diff Type 09/18/2024 Auto   Final         No orders of the defined types were placed in this encounter.        Requested Prescriptions     Signed Prescriptions Disp Refills    methylPREDNISolone (MEDROL DOSEPACK) 4 mg tablet 21 tablet 0     Sig: use as directed       Assessment:     1. Lumbosacral radiculopathy    2. Thoracic spine pain                   X-ray left knee [facility] May 15, 2024  Mild tricompartmental degenerative changes    X-ray left hip [facility] May 15, 2024  No acute finding  Mild degenerative changes    Lumbar myelogram [facility] in 19 2022 degenerative changes L3/4 bulging disc no central canal stenosis noted moderate left and mild right foraminal stenosis  L5/S1 mild right foraminal stenosis    X-ray lumbar spine [facility] May 29, 2024  No Acute fracture  Multiple levels moderate  degenerative changes      Bone density study [facility] May 2022 osteopenia associated with least 2 times increased risk for fracture      MRI lumbar spine [facility] May 29, 2024  Multiple level degenerative changes  L4/5 mild-to-moderate neuroforaminal narrowing  L5/S1 mild bilateral neuroforaminal narrowing  Paraspinal muscles & soft tissues: Atrophy of the paraspinal musculature at the L4-5 and L5-S1 level, possibly related to denervation           Plan:    Not using narcotics from our office    History lumbar surgery 2013 [facility]    Has implantable device for sleep apnea inspire  Dr. Ceja [facility], has card, MRI conditional    Spine surgery recommended spinal cord stimulator, lumbar surgery last resort      He had lumbar L4/5 SUSAN # 1, February 13, 2024  He states he had 90% relief after procedure, his procedure did help improve his level function    Requesting Toradol injection Medrol Dosepak for back pain joint pain he states it helps her several months with his discomfort    Toradol 60 mg IM, tolerated well     Medrol Dosepak as directed    Continue home exercise program as directed    Continue nonnarcotic medication as directed    Follow-up as needed, patient request    Dr. Simms February 2025

## 2025-02-12 ENCOUNTER — OFFICE VISIT (OUTPATIENT)
Dept: PAIN MEDICINE | Facility: CLINIC | Age: 65
End: 2025-02-12
Payer: OTHER GOVERNMENT

## 2025-02-12 VITALS
BODY MASS INDEX: 44.09 KG/M2 | HEIGHT: 70 IN | WEIGHT: 308 LBS | SYSTOLIC BLOOD PRESSURE: 144 MMHG | RESPIRATION RATE: 18 BRPM | HEART RATE: 79 BPM | DIASTOLIC BLOOD PRESSURE: 84 MMHG

## 2025-02-12 DIAGNOSIS — M54.17 LUMBOSACRAL RADICULOPATHY: Primary | Chronic | ICD-10-CM

## 2025-02-12 DIAGNOSIS — M54.6 THORACIC SPINE PAIN: Chronic | ICD-10-CM

## 2025-02-12 PROCEDURE — 99999PBSHW PR PBB SHADOW TECHNICAL ONLY FILED TO HB: Mod: PBBFAC,JZ,,

## 2025-02-12 PROCEDURE — 96372 THER/PROPH/DIAG INJ SC/IM: CPT | Mod: PBBFAC | Performed by: PHYSICIAN ASSISTANT

## 2025-02-12 PROCEDURE — 99999 PR PBB SHADOW E&M-EST. PATIENT-LVL IV: CPT | Mod: PBBFAC,,, | Performed by: PHYSICIAN ASSISTANT

## 2025-02-12 PROCEDURE — 99214 OFFICE O/P EST MOD 30 MIN: CPT | Mod: S$PBB,25,, | Performed by: PHYSICIAN ASSISTANT

## 2025-02-12 PROCEDURE — 99214 OFFICE O/P EST MOD 30 MIN: CPT | Mod: PBBFAC | Performed by: PHYSICIAN ASSISTANT

## 2025-02-12 RX ORDER — KETOROLAC TROMETHAMINE 30 MG/ML
60 INJECTION, SOLUTION INTRAMUSCULAR; INTRAVENOUS
Status: COMPLETED | OUTPATIENT
Start: 2025-02-12 | End: 2025-02-12

## 2025-02-12 RX ORDER — METHYLPREDNISOLONE 4 MG/1
TABLET ORAL
Qty: 21 TABLET | Refills: 0 | Status: SHIPPED | OUTPATIENT
Start: 2025-02-12

## 2025-02-12 RX ADMIN — KETOROLAC TROMETHAMINE 60 MG: 30 INJECTION, SOLUTION INTRAMUSCULAR at 11:02

## 2025-04-15 NOTE — H&P (VIEW-ONLY)
Subjective:         Patient ID: Ant Webber is a 64 y.o. male.    Chief Complaint: Back Pain and Leg Pain        Pain  This is a chronic problem. The current episode started more than 1 year ago. The problem occurs daily. The problem has been unchanged. Associated symptoms include arthralgias. Pertinent negatives include no anorexia, chest pain, chills, coughing, diaphoresis, fever, sore throat, vertigo or vomiting.     Review of Systems   Constitutional:  Negative for activity change, appetite change, chills, diaphoresis, fever and unexpected weight change.   HENT:  Negative for drooling, ear discharge, ear pain, facial swelling, mouth dryness, nosebleeds, sore throat, trouble swallowing, voice change and goiter.    Eyes:  Negative for photophobia, pain, discharge, redness and visual disturbance.   Respiratory:  Negative for apnea, cough, choking, chest tightness, shortness of breath, wheezing and stridor.    Cardiovascular:  Negative for chest pain, palpitations and leg swelling.   Gastrointestinal:  Negative for abdominal distention, anorexia, diarrhea, rectal pain, vomiting and fecal incontinence.   Endocrine: Negative for cold intolerance, heat intolerance, polydipsia, polyphagia and polyuria.   Genitourinary:  Negative for bladder incontinence, dysuria, flank pain and frequency.   Musculoskeletal:  Positive for arthralgias, back pain and leg pain.   Integumentary:  Negative for color change and pallor.   Neurological:  Negative for dizziness, vertigo, seizures, syncope, facial asymmetry, speech difficulty, light-headedness, coordination difficulties and memory loss.   Hematological:  Negative for adenopathy. Does not bruise/bleed easily.   Psychiatric/Behavioral:  Negative for agitation, behavioral problems, confusion, decreased concentration, dysphoric mood, hallucinations and suicidal ideas. The patient is not nervous/anxious and is not hyperactive.            Past Medical History:   Diagnosis Date     Diabetes     Diarrhea 2023    Hypertension     Lumbar post-laminectomy syndrome     Lumbosacral radiculopathy     Sleep apnea     Transient ischemic attack (TIA)      Past Surgical History:   Procedure Laterality Date    BACK SURGERY  2013    Bilateral L4-L5 TFESI Bilateral 10-, 2017, 2017    Dr Simms    Bilateral L5-S1 TFESI Bilateral 05/15/2019    Dr Simms    CHOLECYSTOTOMY      EPIDURAL STEROID INJECTION N/A 2024    Procedure: Injection, Steroid, Epidural, L4/5;  Surgeon: Nani Simms MD;  Location: [facility] PAIN [facility];  Service: Pain Management;  Laterality: N/A;    HAND SURGERY      HIP ARTHROSCOPY W/ LABRAL DEBRIDEMENT      L5-S1 SUSAN  2019    Dr Simms    Left L4-L5 TFESI Left 8-, 2016, 2016    Dr Simms    ROBOTIC ARTHROPLASTY, HIP Right 2024    Procedure: ROBOTIC ARTHROPLASTY,HIP;  Surgeon: Herson Mcintosh MD;  Location: [facility] ORTHO OR;  Service: Orthopedics;  Laterality: Right;    SELECTIVE INJECTION OF ANESTHETIC AGENT AROUND LUMBAR SPINAL NERVE ROOT BY TRANSFORAMINAL APPROACH Left 2021    Procedure: Left L5-S1 TFESI;  Surgeon: Nani Simms MD;  Location: [facility];  Service: Pain Management;  Laterality: Left;  NO VAC   WILL BE TESTED    RECENTLY HAD COVID    TONSILLECTOMY      UMBILICAL HERNIA REPAIR       Social History     Socioeconomic History    Marital status:    Occupational History    Occupation: retail sales   Tobacco Use    Smoking status: Former     Current packs/day: 0.00     Types: Cigarettes     Quit date:      Years since quittin.3    Smokeless tobacco: Former     Types: Snuff     Quit date:    Substance and Sexual Activity    Alcohol use: Yes     Comment: occasionally    Drug use: Never     Social Drivers of Health     Financial Resource Strain: Low Risk  (2024)    Overall Financial Resource Strain (CARDIA)     Difficulty of Paying Living Expenses: Not very hard   Food  "Insecurity: No Food Insecurity (9/12/2024)    Hunger Vital Sign     Worried About Running Out of Food in the Last Year: Never true     Ran Out of Food in the Last Year: Never true   Physical Activity: Unknown (9/12/2024)    Exercise Vital Sign     Days of Exercise per Week: 0 days   Stress: No Stress Concern Present (9/12/2024)    [employer] [place] [employer] - Occupational Stress Questionnaire     Feeling of Stress : Not at all   Housing Stability: Unknown (9/12/2024)    Housing Stability Vital Sign     Unable to Pay for Housing in the Last Year: No     Family History   Problem Relation Name Age of Onset    Parkinsonism Mother      Hypertension Mother      Diabetes Father      Parkinsonism Father       Review of patient's allergies indicates:  No Known Allergies     Objective:  Vitals:    04/22/25 1023   BP: 128/65   Pulse: 76   Resp: 16   Weight: 134.7 kg (297 lb)   Height: 5' 10" (1.778 m)   PainSc:   6                   Physical Exam  Vitals and nursing note reviewed. Exam conducted with a chaperone present.   Constitutional:       General: He is awake. He is not in acute distress.     Appearance: Normal appearance. He is not ill-appearing or diaphoretic.   HENT:      Head: Normocephalic and atraumatic.      Nose: Nose normal.      Mouth/Throat:      Mouth: Mucous membranes are moist.      Pharynx: Oropharynx is clear.   Eyes:      Conjunctiva/sclera: Conjunctivae normal.      Pupils: Pupils are equal, round, and reactive to light.   Cardiovascular:      Rate and Rhythm: Normal rate.   Pulmonary:      Effort: Pulmonary effort is normal. No respiratory distress.   Abdominal:      Palpations: Abdomen is soft.      Tenderness: There is no guarding.   Musculoskeletal:         General: Normal range of motion.      Cervical back: Normal range of motion and neck supple. No rigidity.      Thoracic back: Tenderness present.      Lumbar back: Tenderness present.   Skin:     General: Skin is warm and dry.      " Coloration: Skin is not jaundiced or pale.   Neurological:      General: No focal deficit present.      Mental Status: He is alert and oriented to person, place, and time. Mental status is at baseline.      Cranial Nerves: No cranial nerve deficit (II-XII).   Psychiatric:         Mood and Affect: Mood normal.         Behavior: Behavior normal. Behavior is cooperative.         Thought Content: Thought content normal.           X-Ray Hip 2 or 3 views Right with Pelvis when performed  See Procedure Notes for results.     IMPRESSION: Please see Ortho procedure notes for report.      This procedure was auto-finalized by: Virtual Radiologist         Office Visit on 12/05/2024   Component Date Value Ref Range Status    POC Molecular Influenza A Ag 12/05/2024 Positive (A)  Negative Final    POC Molecular Influenza B Ag 12/05/2024 Negative  Negative Final     Acceptable 12/05/2024 Yes   Final    POC Rapid COVID 12/05/2024 Negative  Negative Final     Acceptable 12/05/2024 Yes   Final         Orders Placed This Encounter   Procedures    Case Request Operating Room: Injection-steroid-epidural-lumbar L4/5     Medical Necessity::   Medically Non-Urgent [100]     Case classification:   E - Elective [90]     Is an on-site pathologist required for this procedure?:   N/A         Requested Prescriptions      No prescriptions requested or ordered in this encounter       Assessment:     1. Lumbosacral radiculopathy    2. Thoracic spine pain                     X-ray left knee [facility] May 15, 2024  Mild tricompartmental degenerative changes    X-ray left hip [facility] May 15, 2024  No acute finding  Mild degenerative changes    Lumbar myelogram [facility] in 19 2022 degenerative changes L3/4 bulging disc no central canal stenosis noted moderate left and mild right foraminal stenosis  L5/S1 mild right foraminal stenosis    X-ray lumbar spine [facility] May 29, 2024  No Acute  fracture  Multiple levels moderate degenerative changes      Bone density study [facility] May 2022 osteopenia associated with least 2 times increased risk for fracture      MRI lumbar spine [facility] May 29, 2024  Multiple level degenerative changes  L4/5 mild-to-moderate neuroforaminal narrowing  L5/S1 mild bilateral neuroforaminal narrowing  Paraspinal muscles & soft tissues: Atrophy of the paraspinal musculature at the L4-5 and L5-S1 level, possibly related to denervation           Plan:    Not using narcotics from our office    History lumbar surgery 2013 [facility] [place]    Has implantable device for sleep apnea anamire  Dr. Ceja [facility], has card, MRI conditional    Spine surgery recommended spinal cord stimulator, lumbar surgery last resort      He had lumbar L4/5 SUSAN # 1, February 13, 2024  He states he had 90% relief after procedure, his procedure did help improve his level function    Requesting Toradol injection for back pain joint pain    Toradol 60 mg IM, tolerated well     Complaint back pain bilateral leg pain numbness and tingling radicular in nature ongoing for more than 3 months requesting lumbar procedure for lumbar radiculopathy symptoms    Continue home exercise program as directed    Continue nonnarcotic medication as directed    Indications for this procedure for this specific patient include the following     - Injections being provided as part of a comprehensive pain management program.    - Pt has failed 6 weeks of conservative therapy including oral meds, PT and or home exercise program which has been discussed with the patient   - Injection being provided for suspected radicular pain.    - Pain scale of greater than or equal to 3/10 with functional impairment  - No evidence of local or systemic infection, bleeding tendency or unstable medical condition.    - Pain is causing significant functional limitation resulting in diminished quality of life and impaired age  appropriate ADL's.   - Repeat injections are done no sooner than 7 days after the previous injection  - Epidural done for suspected radicular pain along dermatome of nerve   - Epidural done to differentiate level of radicular nerve root pain   -procedure done prior to surgical consideration  - Repeat injections done only when pt reports 50% improvement in pain from previous injections    -increased level of function  - patient is aware if they take any NSAIDs/anticoagulant prior to procedure procedure will be postponed, this was listed on the preop instructions and highlighted, list of NSAIDs/anticoagulant reviewed with patient to include methotrexate  - Injection done at L4/5 level(s) which is consistent with patient's dermatomal pain complaint  The planned medically necessary  surgical procedure is performed in a hospital outpatient department and not in an ambulatory surgical center due to:     -there is no geographically assessable ambulatory surgery center that has the  necessary equipment and fluoroscopy needed for the procedure     -there is no geographically assessable ambulatory surgical center available at which the physician has privileges     -an ASC's  specific  guideline regarding the individuals weight or health conditions that prevent the use of an ASC     -done under fluoro  Monitor anesthesia request is medically indicated for the scheduled nerve block procedure due to:  1- needle phobia and anxiety, placing  the patient at risk during the provided service.  2-patient has an ASA class greater than 3 and requires constant presence of an anesthesiologist during the procedure,   3-patient has severe problems hard to lie still  4-patient suffers from chronic pain and is unable to function due to  diminished ADLs    Schedule lumbar L4/5 SUSAN # 1, lumbar radiculopathy    Dr. Simms

## 2025-04-15 NOTE — PROGRESS NOTES
Subjective:         Patient ID: Ant Webber is a 64 y.o. male.    Chief Complaint: Back Pain and Leg Pain        Pain  This is a chronic problem. The current episode started more than 1 year ago. The problem occurs daily. The problem has been unchanged. Associated symptoms include arthralgias. Pertinent negatives include no anorexia, chest pain, chills, coughing, diaphoresis, fever, sore throat, vertigo or vomiting.     Review of Systems   Constitutional:  Negative for activity change, appetite change, chills, diaphoresis, fever and unexpected weight change.   HENT:  Negative for drooling, ear discharge, ear pain, facial swelling, mouth dryness, nosebleeds, sore throat, trouble swallowing, voice change and goiter.    Eyes:  Negative for photophobia, pain, discharge, redness and visual disturbance.   Respiratory:  Negative for apnea, cough, choking, chest tightness, shortness of breath, wheezing and stridor.    Cardiovascular:  Negative for chest pain, palpitations and leg swelling.   Gastrointestinal:  Negative for abdominal distention, anorexia, diarrhea, rectal pain, vomiting and fecal incontinence.   Endocrine: Negative for cold intolerance, heat intolerance, polydipsia, polyphagia and polyuria.   Genitourinary:  Negative for bladder incontinence, dysuria, flank pain and frequency.   Musculoskeletal:  Positive for arthralgias, back pain and leg pain.   Integumentary:  Negative for color change and pallor.   Neurological:  Negative for dizziness, vertigo, seizures, syncope, facial asymmetry, speech difficulty, light-headedness, coordination difficulties and memory loss.   Hematological:  Negative for adenopathy. Does not bruise/bleed easily.   Psychiatric/Behavioral:  Negative for agitation, behavioral problems, confusion, decreased concentration, dysphoric mood, hallucinations and suicidal ideas. The patient is not nervous/anxious and is not hyperactive.            Past Medical History:   Diagnosis Date     Diabetes     Diarrhea 2023    Hypertension     Lumbar post-laminectomy syndrome     Lumbosacral radiculopathy     Sleep apnea     Transient ischemic attack (TIA)      Past Surgical History:   Procedure Laterality Date    BACK SURGERY  2013    Bilateral L4-L5 TFESI Bilateral 10-, 2017, 2017    Dr Simms    Bilateral L5-S1 TFESI Bilateral 05/15/2019    Dr Simms    CHOLECYSTOTOMY      EPIDURAL STEROID INJECTION N/A 2024    Procedure: Injection, Steroid, Epidural, L4/5;  Surgeon: Nani Simms MD;  Location: [facility];  Service: Pain Management;  Laterality: N/A;    HAND SURGERY      HIP ARTHROSCOPY W/ LABRAL DEBRIDEMENT      L5-S1 SUSAN  2019    Dr Simms    Left L4-L5 TFESI Left 8-, 2016, 2016    Dr Simms    ROBOTIC ARTHROPLASTY, HIP Right 2024    Procedure: ROBOTIC ARTHROPLASTY,HIP;  Surgeon: Herson Mcintosh MD;  Location: [facility] ORTHO OR;  Service: Orthopedics;  Laterality: Right;    SELECTIVE INJECTION OF ANESTHETIC AGENT AROUND LUMBAR SPINAL NERVE ROOT BY TRANSFORAMINAL APPROACH Left 2021    Procedure: Left L5-S1 TFESI;  Surgeon: Nani Simms MD;  Location: [facility];  Service: Pain Management;  Laterality: Left;  NO VAC   WILL BE TESTED    RECENTLY HAD COVID    TONSILLECTOMY      UMBILICAL HERNIA REPAIR       Social History     Socioeconomic History    Marital status:    Occupational History    Occupation: retail sales   Tobacco Use    Smoking status: Former     Current packs/day: 0.00     Types: Cigarettes     Quit date:      Years since quittin.3    Smokeless tobacco: Former     Types: Snuff     Quit date:    Substance and Sexual Activity    Alcohol use: Yes     Comment: occasionally    Drug use: Never     Social Drivers of Health     Financial Resource Strain: Low Risk  (2024)    Overall Financial Resource Strain (CARDIA)     Difficulty of Paying Living Expenses: Not very hard   Food  "Insecurity: No Food Insecurity (9/12/2024)    Hunger Vital Sign     Worried About Running Out of Food in the Last Year: Never true     Ran Out of Food in the Last Year: Never true   Physical Activity: Unknown (9/12/2024)    Exercise Vital Sign     Days of Exercise per Week: 0 days   Stress: No Stress Concern Present (9/12/2024)    [employer] [place] [employer] - Occupational Stress Questionnaire     Feeling of Stress : Not at all   Housing Stability: Unknown (9/12/2024)    Housing Stability Vital Sign     Unable to Pay for Housing in the Last Year: No     Family History   Problem Relation Name Age of Onset    Parkinsonism Mother      Hypertension Mother      Diabetes Father      Parkinsonism Father       Review of patient's allergies indicates:  No Known Allergies     Objective:  Vitals:    04/22/25 1023   BP: 128/65   Pulse: 76   Resp: 16   Weight: 134.7 kg (297 lb)   Height: 5' 10" (1.778 m)   PainSc:   6                   Physical Exam  Vitals and nursing note reviewed. Exam conducted with a chaperone present.   Constitutional:       General: He is awake. He is not in acute distress.     Appearance: Normal appearance. He is not ill-appearing or diaphoretic.   HENT:      Head: Normocephalic and atraumatic.      Nose: Nose normal.      Mouth/Throat:      Mouth: Mucous membranes are moist.      Pharynx: Oropharynx is clear.   Eyes:      Conjunctiva/sclera: Conjunctivae normal.      Pupils: Pupils are equal, round, and reactive to light.   Cardiovascular:      Rate and Rhythm: Normal rate.   Pulmonary:      Effort: Pulmonary effort is normal. No respiratory distress.   Abdominal:      Palpations: Abdomen is soft.      Tenderness: There is no guarding.   Musculoskeletal:         General: Normal range of motion.      Cervical back: Normal range of motion and neck supple. No rigidity.      Thoracic back: Tenderness present.      Lumbar back: Tenderness present.   Skin:     General: Skin is warm and dry.      " Coloration: Skin is not jaundiced or pale.   Neurological:      General: No focal deficit present.      Mental Status: He is alert and oriented to person, place, and time. Mental status is at baseline.      Cranial Nerves: No cranial nerve deficit (II-XII).   Psychiatric:         Mood and Affect: Mood normal.         Behavior: Behavior normal. Behavior is cooperative.         Thought Content: Thought content normal.           X-Ray Hip 2 or 3 views Right with Pelvis when performed  See Procedure Notes for results.     IMPRESSION: Please see Ortho procedure notes for report.      This procedure was auto-finalized by: Virtual Radiologist         Office Visit on 12/05/2024   Component Date Value Ref Range Status    POC Molecular Influenza A Ag 12/05/2024 Positive (A)  Negative Final    POC Molecular Influenza B Ag 12/05/2024 Negative  Negative Final     Acceptable 12/05/2024 Yes   Final    POC Rapid COVID 12/05/2024 Negative  Negative Final     Acceptable 12/05/2024 Yes   Final         Orders Placed This Encounter   Procedures    Case Request Operating Room: Injection-steroid-epidural-lumbar L4/5     Medical Necessity::   Medically Non-Urgent [100]     Case classification:   E - Elective [90]     Is an on-site pathologist required for this procedure?:   N/A         Requested Prescriptions      No prescriptions requested or ordered in this encounter       Assessment:     1. Lumbosacral radiculopathy    2. Thoracic spine pain                     X-ray left knee [facility] May 15, 2024  Mild tricompartmental degenerative changes    X-ray left hip [facility] May 15, 2024  No acute finding  Mild degenerative changes    Lumbar myelogram [facility] in 19 2022 degenerative changes L3/4 bulging disc no central canal stenosis noted moderate left and mild right foraminal stenosis  L5/S1 mild right foraminal stenosis    X-ray lumbar spine [facility] May 29, 2024  No Acute  fracture  Multiple levels moderate degenerative changes      Bone density study [facility] May 2022 osteopenia associated with least 2 times increased risk for fracture      MRI lumbar spine [facility] May 29, 2024  Multiple level degenerative changes  L4/5 mild-to-moderate neuroforaminal narrowing  L5/S1 mild bilateral neuroforaminal narrowing  Paraspinal muscles & soft tissues: Atrophy of the paraspinal musculature at the L4-5 and L5-S1 level, possibly related to denervation           Plan:    Not using narcotics from our office    History lumbar surgery 2013 [facility] [place]    Has implantable device for sleep apnea anamire  Dr. Ceja [facility], has card, MRI conditional    Spine surgery recommended spinal cord stimulator, lumbar surgery last resort      He had lumbar L4/5 SUSAN # 1, February 13, 2024  He states he had 90% relief after procedure, his procedure did help improve his level function    Requesting Toradol injection for back pain joint pain    Toradol 60 mg IM, tolerated well     Complaint back pain bilateral leg pain numbness and tingling radicular in nature ongoing for more than 3 months requesting lumbar procedure for lumbar radiculopathy symptoms    Continue home exercise program as directed    Continue nonnarcotic medication as directed    Indications for this procedure for this specific patient include the following     - Injections being provided as part of a comprehensive pain management program.    - Pt has failed 6 weeks of conservative therapy including oral meds, PT and or home exercise program which has been discussed with the patient   - Injection being provided for suspected radicular pain.    - Pain scale of greater than or equal to 3/10 with functional impairment  - No evidence of local or systemic infection, bleeding tendency or unstable medical condition.    - Pain is causing significant functional limitation resulting in diminished quality of life and impaired age  appropriate ADL's.   - Repeat injections are done no sooner than 7 days after the previous injection  - Epidural done for suspected radicular pain along dermatome of nerve   - Epidural done to differentiate level of radicular nerve root pain   -procedure done prior to surgical consideration  - Repeat injections done only when pt reports 50% improvement in pain from previous injections    -increased level of function  - patient is aware if they take any NSAIDs/anticoagulant prior to procedure procedure will be postponed, this was listed on the preop instructions and highlighted, list of NSAIDs/anticoagulant reviewed with patient to include methotrexate  - Injection done at L4/5 level(s) which is consistent with patient's dermatomal pain complaint  The planned medically necessary  surgical procedure is performed in a hospital outpatient department and not in an ambulatory surgical center due to:     -there is no geographically assessable ambulatory surgery center that has the  necessary equipment and fluoroscopy needed for the procedure     -there is no geographically assessable ambulatory surgical center available at which the physician has privileges     -an ASC's  specific  guideline regarding the individuals weight or health conditions that prevent the use of an ASC     -done under fluoro  Monitor anesthesia request is medically indicated for the scheduled nerve block procedure due to:  1- needle phobia and anxiety, placing  the patient at risk during the provided service.  2-patient has an ASA class greater than 3 and requires constant presence of an anesthesiologist during the procedure,   3-patient has severe problems hard to lie still  4-patient suffers from chronic pain and is unable to function due to  diminished ADLs    Schedule lumbar L4/5 SUSAN # 1, lumbar radiculopathy    Dr. Simms

## 2025-04-22 ENCOUNTER — OFFICE VISIT (OUTPATIENT)
Dept: PAIN MEDICINE | Facility: CLINIC | Age: 65
End: 2025-04-22
Payer: OTHER GOVERNMENT

## 2025-04-22 VITALS
HEART RATE: 76 BPM | BODY MASS INDEX: 42.52 KG/M2 | WEIGHT: 297 LBS | HEIGHT: 70 IN | RESPIRATION RATE: 16 BRPM | DIASTOLIC BLOOD PRESSURE: 65 MMHG | SYSTOLIC BLOOD PRESSURE: 128 MMHG

## 2025-04-22 DIAGNOSIS — M54.6 THORACIC SPINE PAIN: Chronic | ICD-10-CM

## 2025-04-22 DIAGNOSIS — M54.17 LUMBOSACRAL RADICULOPATHY: Primary | Chronic | ICD-10-CM

## 2025-04-22 PROCEDURE — 99215 OFFICE O/P EST HI 40 MIN: CPT | Mod: PBBFAC | Performed by: PHYSICIAN ASSISTANT

## 2025-04-22 PROCEDURE — 96372 THER/PROPH/DIAG INJ SC/IM: CPT | Mod: PBBFAC | Performed by: PHYSICIAN ASSISTANT

## 2025-04-22 PROCEDURE — 99999PBSHW PR PBB SHADOW TECHNICAL ONLY FILED TO HB: Mod: PBBFAC,JZ,,

## 2025-04-22 PROCEDURE — 99214 OFFICE O/P EST MOD 30 MIN: CPT | Mod: S$PBB,25,, | Performed by: PHYSICIAN ASSISTANT

## 2025-04-22 PROCEDURE — 99999 PR PBB SHADOW E&M-EST. PATIENT-LVL V: CPT | Mod: PBBFAC,,, | Performed by: PHYSICIAN ASSISTANT

## 2025-04-22 RX ORDER — KETOROLAC TROMETHAMINE 30 MG/ML
60 INJECTION, SOLUTION INTRAMUSCULAR; INTRAVENOUS
Status: COMPLETED | OUTPATIENT
Start: 2025-04-22 | End: 2025-04-22

## 2025-04-22 RX ADMIN — KETOROLAC TROMETHAMINE 60 MG: 30 INJECTION, SOLUTION INTRAMUSCULAR at 10:04

## 2025-04-22 NOTE — PATIENT INSTRUCTIONS

## 2025-05-06 ENCOUNTER — HOSPITAL ENCOUNTER (OUTPATIENT)
Facility: HOSPITAL | Age: 65
Discharge: HOME | End: 2025-05-06
Attending: PAIN MEDICINE | Admitting: PAIN MEDICINE
Payer: OTHER GOVERNMENT

## 2025-05-06 VITALS
BODY MASS INDEX: 41.8 KG/M2 | TEMPERATURE: 99 F | SYSTOLIC BLOOD PRESSURE: 131 MMHG | OXYGEN SATURATION: 100 % | DIASTOLIC BLOOD PRESSURE: 77 MMHG | WEIGHT: 292 LBS | HEART RATE: 88 BPM | RESPIRATION RATE: 18 BRPM | HEIGHT: 70 IN

## 2025-05-06 DIAGNOSIS — M54.16 LUMBAR RADICULOPATHY: ICD-10-CM

## 2025-05-06 PROCEDURE — 62323 NJX INTERLAMINAR LMBR/SAC: CPT | Mod: ,,, | Performed by: PAIN MEDICINE

## 2025-05-06 PROCEDURE — 25500020 PHARM REV CODE 255: Performed by: PAIN MEDICINE

## 2025-05-06 PROCEDURE — 62323 NJX INTERLAMINAR LMBR/SAC: CPT | Performed by: PAIN MEDICINE

## 2025-05-06 PROCEDURE — 63600175 PHARM REV CODE 636 W HCPCS: Performed by: PAIN MEDICINE

## 2025-05-06 RX ORDER — TIRZEPATIDE 5 MG/.5ML
5 INJECTION, SOLUTION SUBCUTANEOUS
COMMUNITY

## 2025-05-06 RX ORDER — TRIAMCINOLONE ACETONIDE 40 MG/ML
INJECTION, SUSPENSION INTRA-ARTICULAR; INTRAMUSCULAR CODE/TRAUMA/SEDATION MEDICATION
Status: DISCONTINUED | OUTPATIENT
Start: 2025-05-06 | End: 2025-05-06 | Stop reason: HOSPADM

## 2025-05-06 RX ORDER — SODIUM CHLORIDE 9 MG/ML
INJECTION, SOLUTION INTRAVENOUS CONTINUOUS
OUTPATIENT
Start: 2025-05-06

## 2025-05-06 RX ORDER — IOPAMIDOL 612 MG/ML
INJECTION, SOLUTION INTRAVASCULAR CODE/TRAUMA/SEDATION MEDICATION
Status: DISCONTINUED | OUTPATIENT
Start: 2025-05-06 | End: 2025-05-06 | Stop reason: HOSPADM

## 2025-05-06 NOTE — DISCHARGE SUMMARY
[facility] - Pain Management  Discharge Note  Short Stay    Procedure(s) (LRB):  Injection-steroid-epidural-lumbar L4/5 (N/A)      OUTCOME: Patient tolerated treatment/procedure well without complication and is now ready for discharge.    DISPOSITION: Home or Self Care    FINAL DIAGNOSIS:  Lumbar radiculopathy    FOLLOWUP: In clinic    DISCHARGE INSTRUCTIONS:  See  Nurse's notes     TIME SPENT ON DISCHARGE: 5 minutes

## 2025-05-06 NOTE — BRIEF OP NOTE
The  Discharge Note  Short Stay    Admit Date: 5/6/2025    Discharge Date: 5/6/2025    Attending Physician: Nani Simms     Discharge Provider: Nani Simms    Diagnosis:  Lumbar radiculopathy    Procedure performed:  L4-5 epidural steroid injection and epidurogram utilizing fluoroscopy    Findings: Procedure tolerated well and without complications. Consistent with diagnosis.    EBL: 0cc    Specimens: None    Discharged Condition: Good    Final Diagnoses: Thoracic spine pain [M54.6]  Lumbosacral radiculopathy [M54.17]    Disposition: Home or Self Care    Hospital Course: No complications, uneventful    Outcome of Hospitalization, Treatment, Procedure, or Surgery:  Patient was admitted for outpatient interventional pain management procedure. The patient tolerated the procedure well with no complications.    Follow up/Patient Instructions:  Follow up as scheduled in Pain Management office in 3-4 weeks.  Patient has received instructions and follow up date and time.    Medications:  Continue previous medications

## 2025-05-06 NOTE — PLAN OF CARE
"REFER TO WRITTEN DOCUMENT AND RECOVERY INFORMATION.    D/CD PATIENT VIAA WHEELCHAIR AT 1223.    INFORMED PATIENT IF UNABLE TO VOID IN 8 HOURS, GO TO ER. NOTIFY MD OF REDNESS OR DRAINAGE FROM INJECTION SITE OR FEVER OVER 3-4 DAY. REST AND DRINK PLENTY OF FLUIDS FOR THE REMAINDER OF THE DAY. NO LIFTING OVER 5 LBS FOR THE REMAINDER OF THE DAY. CONTINUE REGULAR MEDICATIONS AS PRESCRIBED. MAY TAKE PAIN MEDICATION AS PRESCRIBED.     PAIN IMPROVED  100%  PRE OP PAIN 6.  POSTOP PAIN 0.        1005 PT AWAKE, ALERT, NO SEDATION. PT. STATES, "MY LEGS ARE NUMB."  1050 BIALTERAL EXTREMITIES REMAIN NUMB.    1125 LOWER EXTREMITIES REMAIN NUMB, YET ARE BETTER.  "

## 2025-05-06 NOTE — OP NOTE
"Procedure Note    Procedure Date: 5/6/2025    Procedure Performed:  Lumbar interlaminar epidural steroid injection under fluoroscopy at L 4-5    Indications: Patient failed conservative therapy.      Pre-op diagnosis: Lumbar Radiculopathy    Post-op diagnosis: same    Physician: Nani Simms MD    Anesthesia:  Local    Medications injected: Kenalog 40mg,  2 mL sterile preservative-free normal saline.    Local anesthetic used: 1% Lidocaine, 3 ml    Estimated Blood Loss:  None    Complications:  None    Technique:  The patient was interviewed in the holding area and Risks/Benefits were discussed, including, but not limited to, the possibility of new or different pain, bleeding or infection.   All questions were answered.  The patient understood and accepted risks.  Consent was verified and signed.   A time-out was taken to identify patient and procedure prior to starting the procedure. The patient was placed in the prone position on the fluoroscopy table. The area of the lumbar spine was prepped with Chloraprep and draped in a sterile manner. The L 4-5  interspace was identified and marked under AP fluoroscopy. The skin and subcutaneous tissues overlying the targeted interspace were anesthetized with 3-5 mL of 1% lidocaine using a 25G 1.5" needle.  A 20G  3.5" Tuohy epidural needle was directed toward the interspace under fluoroscopic guidance until the ligamentum flavum was engaged. From this point, a loss of resistance technique with a pulsator syringe a was used to identify entrance of the needle into the epidural space. Once loss of resistance was observed 3mL of Isovue contrast solution was injected. An appropriate epidurogram was noted.  A 3mL mixture consisting of saline and 40 mg of kenalog was injected slowly and without resistance.  The needle was  removed and a sterile Band-Aid dressing was applied to the puncture site.  The patient tolerated the procedure well and was transferred to the .AC. in stable " condition.  The patient was monitored after the procedure and was given post-procedure and discharge instructions to follow at home. The patient was discharged in a stable condition and accompanied by an adult .    Epidurogram:5 mL allotment of Isovue M 300 contrast revealed excellent delineation from L 4-S1. There were no filling defects or obstruction to dye flow noted.  There was no  intravascular or intrathecal spread noted with dye flow.

## 2025-05-08 PROCEDURE — 88305 TISSUE EXAM BY PATHOLOGIST: CPT | Mod: TC,SUR | Performed by: OPHTHALMOLOGY

## 2025-05-08 PROCEDURE — 88305 TISSUE EXAM BY PATHOLOGIST: CPT | Mod: 26,,, | Performed by: PATHOLOGY

## 2025-05-09 ENCOUNTER — LAB REQUISITION (OUTPATIENT)
Dept: LAB | Facility: HOSPITAL | Age: 65
End: 2025-05-09
Attending: OPHTHALMOLOGY
Payer: OTHER GOVERNMENT

## 2025-05-09 DIAGNOSIS — D48.5 NEOPLASM OF UNCERTAIN BEHAVIOR OF SKIN: ICD-10-CM

## 2025-05-19 NOTE — PROGRESS NOTES
Subjective:         Patient ID: Ant Webber is a 64 y.o. male.    Chief Complaint: Low-back Pain and Leg Pain (Left )        Pain  This is a chronic problem. The current episode started more than 1 year ago. The problem occurs daily. The problem has been gradually improving. Associated symptoms include arthralgias. Pertinent negatives include no anorexia, chest pain, chills, coughing, diaphoresis, fever, sore throat, vertigo or vomiting.     Review of Systems   Constitutional:  Negative for activity change, appetite change, chills, diaphoresis, fever and unexpected weight change.   HENT:  Negative for drooling, ear discharge, ear pain, facial swelling, mouth dryness, nosebleeds, sore throat, trouble swallowing, voice change and goiter.    Eyes:  Negative for photophobia, pain, discharge, redness and visual disturbance.   Respiratory:  Negative for apnea, cough, choking, chest tightness, shortness of breath, wheezing and stridor.    Cardiovascular:  Negative for chest pain, palpitations and leg swelling.   Gastrointestinal:  Negative for abdominal distention, anorexia, diarrhea, rectal pain, vomiting and fecal incontinence.   Endocrine: Negative for cold intolerance, heat intolerance, polydipsia, polyphagia and polyuria.   Genitourinary:  Negative for bladder incontinence, dysuria, flank pain and frequency.   Musculoskeletal:  Positive for arthralgias, back pain and leg pain.   Integumentary:  Negative for color change and pallor.   Neurological:  Negative for dizziness, vertigo, seizures, syncope, facial asymmetry, speech difficulty, light-headedness, coordination difficulties and memory loss.   Hematological:  Negative for adenopathy. Does not bruise/bleed easily.   Psychiatric/Behavioral:  Negative for agitation, behavioral problems, confusion, decreased concentration, dysphoric mood, hallucinations and suicidal ideas. The patient is not nervous/anxious and is not hyperactive.            Past Medical History:    Diagnosis Date    Diabetes     Diarrhea 2023    Hypertension     Lumbar post-laminectomy syndrome     Lumbosacral radiculopathy     Sleep apnea     Transient ischemic attack (TIA)      Past Surgical History:   Procedure Laterality Date    BACK SURGERY  2013    Bilateral L4-L5 TFESI Bilateral 10-, 2017, 2017    Dr Simms    Bilateral L5-S1 TFESI Bilateral 05/15/2019    Dr Simms    CHOLECYSTOTOMY      EPIDURAL STEROID INJECTION N/A 2024    Procedure: Injection, Steroid, Epidural, L4/5;  Surgeon: Nani Simms MD;  Location: [facility] PAIN [facility];  Service: Pain Management;  Laterality: N/A;    EPIDURAL STEROID INJECTION INTO LUMBAR SPINE N/A 2025    Procedure: Injection-steroid-epidural-lumbar L4/5;  Surgeon: Nani Simms MD;  Location: [facility];  Service: Pain Management;  Laterality: N/A;    HAND SURGERY      HIP ARTHROSCOPY W/ LABRAL DEBRIDEMENT      L5-S1 SUSAN  2019    Dr Simms    Left L4-L5 TFESI Left 8-, 2016, 2016    Dr Simms    ROBOTIC ARTHROPLASTY, HIP Right 2024    Procedure: ROBOTIC ARTHROPLASTY,HIP;  Surgeon: Herson Mcintosh MD;  Location: [facility] ORTHO OR;  Service: Orthopedics;  Laterality: Right;    SELECTIVE INJECTION OF ANESTHETIC AGENT AROUND LUMBAR SPINAL NERVE ROOT BY TRANSFORAMINAL APPROACH Left 2021    Procedure: Left L5-S1 TFESI;  Surgeon: Nani Simms MD;  Location: [facility]IN [facility];  Service: Pain Management;  Laterality: Left;  NO VAC   WILL BE TESTED    RECENTLY HAD COVID    TONSILLECTOMY      UMBILICAL HERNIA REPAIR       Social History     Socioeconomic History    Marital status:    Occupational History    Occupation: retail sales   Tobacco Use    Smoking status: Former     Current packs/day: 0.00     Types: Cigarettes     Quit date:      Years since quittin.4    Smokeless tobacco: Former     Types: Snuff     Quit date:    Substance and Sexual Activity    Alcohol use:  "Yes     Comment: occasionally    Drug use: Never     Social Drivers of Health     Financial Resource Strain: Low Risk  (9/12/2024)    Overall Financial Resource Strain (CARDIA)     Difficulty of Paying Living Expenses: Not very hard   Food Insecurity: No Food Insecurity (9/12/2024)    Hunger Vital Sign     Worried About Running Out of Food in the Last Year: Never true     Ran Out of Food in the Last Year: Never true   Physical Activity: Unknown (9/12/2024)    Exercise Vital Sign     Days of Exercise per Week: 0 days   Stress: No Stress Concern Present (9/12/2024)    [employer] [place] [employer] - Occupational Stress Questionnaire     Feeling of Stress : Not at all   Housing Stability: Unknown (9/12/2024)    Housing Stability Vital Sign     Unable to Pay for Housing in the Last Year: No     Family History   Problem Relation Name Age of Onset    Parkinsonism Mother      Hypertension Mother      Diabetes Father      Parkinsonism Father       Review of patient's allergies indicates:  No Known Allergies     Objective:  Vitals:    05/28/25 1431 05/28/25 1432   BP: (!) 147/79    Pulse: 92    Resp: 16    Weight: 128.8 kg (284 lb)    Height: 5' 10" (1.778 m)    PainSc:   5   5                     Physical Exam  Vitals and nursing note reviewed. Exam conducted with a chaperone present.   Constitutional:       General: He is awake. He is not in acute distress.     Appearance: Normal appearance. He is not ill-appearing or diaphoretic.   HENT:      Head: Normocephalic and atraumatic.      Nose: Nose normal.      Mouth/Throat:      Mouth: Mucous membranes are moist.      Pharynx: Oropharynx is clear.   Eyes:      Conjunctiva/sclera: Conjunctivae normal.      Pupils: Pupils are equal, round, and reactive to light.   Cardiovascular:      Rate and Rhythm: Normal rate.   Pulmonary:      Effort: Pulmonary effort is normal. No respiratory distress.   Abdominal:      Palpations: Abdomen is soft.      Tenderness: There is no " guarding.   Musculoskeletal:         General: Normal range of motion.      Cervical back: Normal range of motion and neck supple. No rigidity.      Thoracic back: Tenderness present.      Lumbar back: Tenderness present.   Skin:     General: Skin is warm and dry.      Coloration: Skin is not jaundiced or pale.   Neurological:      General: No focal deficit present.      Mental Status: He is alert and oriented to person, place, and time. Mental status is at baseline.      Cranial Nerves: No cranial nerve deficit (II-XII).   Psychiatric:         Mood and Affect: Mood normal.         Behavior: Behavior normal. Behavior is cooperative.         Thought Content: Thought content normal.           X-Ray Hip 2 or 3 views Right with Pelvis when performed  See Procedure Notes for results.     IMPRESSION: Please see Ortho procedure notes for report.      This procedure was auto-finalized by: Virtual Radiologist         Lab Requisition on 05/08/2025   Component Date Value Ref Range Status    Case Report 05/08/2025    Final                    Value:Surgical Pathology                                Case: [id number]                                   Authorizing Provider:  Ariana Govea MD      Collected:           05/08/2025 12:00 PM          Ordering Location:     [facility] -     Received:            05/09/2025 10:07 AM                                 Laboratory                                                                   Pathologist:           Robbie Ritchie MD                                                      Specimen:    Skin, Lesion Left Eyelid                                                                   Final Diagnosis 05/08/2025    Final                    Value:A. Skin, left eyelid, biopsy:  - Seborrheic keratosis      Gross Description 05/08/2025    Final                    Value:A. Skin: Lesion Left Eyelid  The specimen is received in formalin designated Lesion Left Eyelid and consists  of a single piece of white-tan skin that measures 0.5 x 0.1 cm.  On the surface of the skin there is a keratinous white-tan nodule that measures 0.4 cm in greatest dimension.  The margins are marked with black ink and the specimen is submitted intact in cassette A1.    Grossing was completed by Bob Mcdermott.      Microscopic Description 05/08/2025    Final                    Value:A microscopic examination was performed and the diagnosis reflects the findings.          Laboratory Notes 05/08/2025    Final                    Value:If this report includes immunohistochemical (IHC) test results, please note the following: IHC studies were interpreted in conjunction with appropriate positive and negative controls which demonstrate the expected positive and negative reactivity. This laboratory is regulated under CLIA as qualified to perform high-complexity testing. IHC tests are used for clinical purposes. They should not be regarded as investigational or research.       Office Visit on 12/05/2024   Component Date Value Ref Range Status    POC Molecular Influenza A Ag 12/05/2024 Positive (A)  Negative Final    POC Molecular Influenza B Ag 12/05/2024 Negative  Negative Final     Acceptable 12/05/2024 Yes   Final    POC Rapid COVID 12/05/2024 Negative  Negative Final     Acceptable 12/05/2024 Yes   Final         No orders of the defined types were placed in this encounter.        Requested Prescriptions      No prescriptions requested or ordered in this encounter       Assessment:     1. Lumbosacral radiculopathy    2. Thoracic spine pain                       X-ray left knee [facility] May 15, 2024  Mild tricompartmental degenerative changes    X-ray left hip [facility] May 15, 2024  No acute finding  Mild degenerative changes    Lumbar myelogram [facility] in 19 2022 degenerative changes L3/4 bulging disc no central canal stenosis noted moderate left and mild right foraminal  stenosis  L5/S1 mild right foraminal stenosis    X-ray lumbar spine [facility] May 29, 2024  No Acute fracture  Multiple levels moderate degenerative changes      Bone density study [facility] May 2022 osteopenia associated with least 2 times increased risk for fracture      MRI lumbar spine [facility] May 29, 2024  Multiple level degenerative changes  L4/5 mild-to-moderate neuroforaminal narrowing  L5/S1 mild bilateral neuroforaminal narrowing  Paraspinal muscles & soft tissues: Atrophy of the paraspinal musculature at the L4-5 and L5-S1 level, possibly related to denervation           Plan:    Not using narcotics from our office    History lumbar surgery 2013 [facility] [place]    Has implantable device for sleep apnea inspire  Dr. Ceja [facility], has card, MRI conditional    Spine surgery recommended spinal cord stimulator, lumbar surgery last resort      Follow-up after lumbar L4/5 SUSAN # 1, May 6, 2025  He states he had 80% relief after procedure  procedure did help improve his level function    Procedure notes reviewed    He would like to continue conservative management this time he is doing very well after his procedure    Continue home exercise program as directed    Continue nonnarcotic medication as directed    Follow-up as needed, patient request    Dr. Simms May 2026

## 2025-05-28 ENCOUNTER — OFFICE VISIT (OUTPATIENT)
Dept: PAIN MEDICINE | Facility: CLINIC | Age: 65
End: 2025-05-28
Payer: OTHER GOVERNMENT

## 2025-05-28 VITALS
HEIGHT: 70 IN | BODY MASS INDEX: 40.66 KG/M2 | DIASTOLIC BLOOD PRESSURE: 79 MMHG | WEIGHT: 284 LBS | RESPIRATION RATE: 16 BRPM | SYSTOLIC BLOOD PRESSURE: 147 MMHG | HEART RATE: 92 BPM

## 2025-05-28 DIAGNOSIS — M54.6 THORACIC SPINE PAIN: Chronic | ICD-10-CM

## 2025-05-28 DIAGNOSIS — M54.17 LUMBOSACRAL RADICULOPATHY: Primary | Chronic | ICD-10-CM

## 2025-05-28 PROCEDURE — 99213 OFFICE O/P EST LOW 20 MIN: CPT | Mod: S$PBB,,, | Performed by: PHYSICIAN ASSISTANT

## 2025-05-28 PROCEDURE — 99214 OFFICE O/P EST MOD 30 MIN: CPT | Mod: PBBFAC | Performed by: PHYSICIAN ASSISTANT

## 2025-05-28 PROCEDURE — 99999 PR PBB SHADOW E&M-EST. PATIENT-LVL IV: CPT | Mod: PBBFAC,,, | Performed by: PHYSICIAN ASSISTANT

## 2025-08-18 ENCOUNTER — OFFICE VISIT (OUTPATIENT)
Dept: PAIN MEDICINE | Facility: CLINIC | Age: 65
End: 2025-08-18
Payer: OTHER GOVERNMENT

## 2025-08-18 VITALS
SYSTOLIC BLOOD PRESSURE: 125 MMHG | DIASTOLIC BLOOD PRESSURE: 87 MMHG | HEART RATE: 88 BPM | BODY MASS INDEX: 37.65 KG/M2 | RESPIRATION RATE: 18 BRPM | HEIGHT: 70 IN | WEIGHT: 263 LBS

## 2025-08-18 DIAGNOSIS — M54.9 DORSALGIA, UNSPECIFIED: Chronic | ICD-10-CM

## 2025-08-18 DIAGNOSIS — M54.17 LUMBOSACRAL RADICULOPATHY: Primary | Chronic | ICD-10-CM

## 2025-08-18 DIAGNOSIS — G89.4 CHRONIC PAIN SYNDROME: Chronic | ICD-10-CM

## 2025-08-18 PROCEDURE — 96372 THER/PROPH/DIAG INJ SC/IM: CPT | Mod: PBBFAC | Performed by: PAIN MEDICINE

## 2025-08-18 PROCEDURE — 99214 OFFICE O/P EST MOD 30 MIN: CPT | Mod: S$PBB,25,, | Performed by: PAIN MEDICINE

## 2025-08-18 PROCEDURE — 99215 OFFICE O/P EST HI 40 MIN: CPT | Mod: PBBFAC,25 | Performed by: PAIN MEDICINE

## 2025-08-18 PROCEDURE — 99999PBSHW PR PBB SHADOW TECHNICAL ONLY FILED TO HB: Mod: PBBFAC,JZ,,

## 2025-08-18 PROCEDURE — 99999 PR PBB SHADOW E&M-EST. PATIENT-LVL V: CPT | Mod: PBBFAC,,, | Performed by: PAIN MEDICINE

## 2025-08-18 RX ORDER — PREGABALIN 100 MG/1
100 CAPSULE ORAL 3 TIMES DAILY
Qty: 90 CAPSULE | Refills: 2 | Status: SHIPPED | OUTPATIENT
Start: 2025-08-18 | End: 2025-10-17

## 2025-08-18 RX ORDER — KETOROLAC TROMETHAMINE 30 MG/ML
60 INJECTION, SOLUTION INTRAMUSCULAR; INTRAVENOUS
Status: COMPLETED | OUTPATIENT
Start: 2025-08-18 | End: 2025-08-18

## 2025-08-18 RX ADMIN — KETOROLAC TROMETHAMINE 60 MG: 30 INJECTION, SOLUTION INTRAMUSCULAR at 03:08

## 2025-09-04 ENCOUNTER — ANESTHESIA (OUTPATIENT)
Dept: PAIN MEDICINE | Facility: HOSPITAL | Age: 65
End: 2025-09-04
Payer: OTHER GOVERNMENT

## 2025-09-04 ENCOUNTER — ANESTHESIA EVENT (OUTPATIENT)
Dept: PAIN MEDICINE | Facility: HOSPITAL | Age: 65
End: 2025-09-04
Payer: OTHER GOVERNMENT

## 2025-09-04 PROCEDURE — 63600175 PHARM REV CODE 636 W HCPCS: Performed by: NURSE ANESTHETIST, CERTIFIED REGISTERED

## 2025-09-04 RX ORDER — PROPOFOL 10 MG/ML
VIAL (ML) INTRAVENOUS
Status: DISCONTINUED | OUTPATIENT
Start: 2025-09-04 | End: 2025-09-04

## 2025-09-04 RX ORDER — LIDOCAINE HYDROCHLORIDE 20 MG/ML
INJECTION, SOLUTION EPIDURAL; INFILTRATION; INTRACAUDAL; PERINEURAL
Status: DISCONTINUED | OUTPATIENT
Start: 2025-09-04 | End: 2025-09-04

## 2025-09-04 RX ADMIN — PROPOFOL 50 MG: 10 INJECTION, EMULSION INTRAVENOUS at 11:09

## 2025-09-04 RX ADMIN — PROPOFOL 50 MG: 10 INJECTION, EMULSION INTRAVENOUS at 10:09

## 2025-09-04 RX ADMIN — LIDOCAINE HYDROCHLORIDE 50 MG: 20 INJECTION, SOLUTION EPIDURAL; INFILTRATION; INTRACAUDAL; PERINEURAL at 10:09

## (undated) DEVICE — KIT TRACKING VIZADISC HIP

## (undated) DEVICE — GLOVE SENSICARE PI GRN 7

## (undated) DEVICE — DRESSING GAUZE XEROFORM 5X9

## (undated) DEVICE — RETRIEVER SUTURE HEWSON DISP

## (undated) DEVICE — GLOVE SENSICARE PI GRN 6.5

## (undated) DEVICE — DECANTER FLUID TRNSF WHITE 9IN

## (undated) DEVICE — APPLICATOR CHLORAPREP HI-LITE TINTED ORANGE 26ML

## (undated) DEVICE — SLIPPER FALL PREV YEL BARIAT

## (undated) DEVICE — TUBE SUCTION KAMVAC MINI 20/BX

## (undated) DEVICE — KIT IV START RUSH

## (undated) DEVICE — SOL CONTINU-FLO SET 2 LAV

## (undated) DEVICE — APPLICATOR CHLORAPREP ORN 26ML

## (undated) DEVICE — NDL TUOHY EPIDURAL 20GX3.5IN

## (undated) DEVICE — NDL ANES SPINAL 18X3.5ST 18G

## (undated) DEVICE — GLOVE SENSICARE PI SURG 7

## (undated) DEVICE — GLOVE SENSICARE PI SURG 7.5

## (undated) DEVICE — Device

## (undated) DEVICE — GLOVE SURGICAL PROTEXIS PI SIZE 6.5

## (undated) DEVICE — PIN BONE 4 X 140MM STERILE
Type: IMPLANTABLE DEVICE | Site: HIP | Status: NON-FUNCTIONAL
Removed: 2024-09-30

## (undated) DEVICE — OVERLAY MATTRESS WAFFLE

## (undated) DEVICE — SOL NACL IRR 1000ML BTL

## (undated) DEVICE — BIT DRILL 2.0MM D DEPTH 110MM

## (undated) DEVICE — KIT IV START

## (undated) DEVICE — SYRINGE 3CC LL 25GX5/8IN

## (undated) DEVICE — GOWN TOGA SYS PEELWY ZIP 2 XL

## (undated) DEVICE — GLOVE SENSICARE PI GRN 7.5

## (undated) DEVICE — SUT 2-0 VICRYL / CT-1

## (undated) DEVICE — BIT DRILL 2.7MM STRAIGHT

## (undated) DEVICE — SYR EPILOR LUER-LOK LOR 7ML

## (undated) DEVICE — MARKER SKIN RULER AND LABEL

## (undated) DEVICE — BANDAGE COHES LTX TAN 4INX5YD

## (undated) DEVICE — GLOVE SENSICARE PI SURG 6.5

## (undated) DEVICE — NDL QUINCKE SPINAL 18G 3.5IN

## (undated) DEVICE — NEEDLE SPINAL SPINOCAN 22GX4 3/4IN CS/50

## (undated) DEVICE — SUT VICRYL PLUS COATED 1 27IN

## (undated) DEVICE — SET IV SOL CONTIN-FLOW 10 DROP/ML (PRIMARY)

## (undated) DEVICE — GLOVE PROTEXIS PI SYN SURG 6.5

## (undated) DEVICE — SUT #2 TI-CRON HGS-21 30IN

## (undated) DEVICE — STAPLER SKIN ROTATING HEAD

## (undated) DEVICE — BLADE SURG #15 CARBON STEEL

## (undated) DEVICE — CATH IV 22G X 1 AUTOGUARD

## (undated) DEVICE — CATH IV JELCO 20GX1 1/4IN

## (undated) DEVICE — DRILL BIT 4X40MM

## (undated) DEVICE — DRAPE INCISE IOBAN 2 23X23IN

## (undated) DEVICE — KIT IRR SUCTION HND PIECE

## (undated) DEVICE — SPACESUIT TOGA T5 ZIPPER PEEL

## (undated) DEVICE — SYR 50CC LL

## (undated) DEVICE — DRESSING PICO 7 TWO 10X40CM

## (undated) DEVICE — KIT CHECKPOINT TIBIAL

## (undated) DEVICE — TRAY NERVE BLOCK UNIV 10/CA

## (undated) DEVICE — KIT DRAPE RIO ONE PIECE W/POCK

## (undated) DEVICE — SET CYSTO IRR DRP CHMBR 84IN

## (undated) DEVICE — CATH IV INTROCAN 22G X 1

## (undated) DEVICE — TRAY EPIDURAL SINGLE SHOT PMA

## (undated) DEVICE — TRAY NERVE BLOCK (KC) PMA

## (undated) DEVICE — CHECKPOINT IMPACT 3.5MM HEX ST